# Patient Record
Sex: FEMALE | Race: WHITE | NOT HISPANIC OR LATINO | Employment: OTHER | URBAN - METROPOLITAN AREA
[De-identification: names, ages, dates, MRNs, and addresses within clinical notes are randomized per-mention and may not be internally consistent; named-entity substitution may affect disease eponyms.]

---

## 2021-11-01 ENCOUNTER — OFFICE VISIT (OUTPATIENT)
Dept: FAMILY MEDICINE CLINIC | Facility: CLINIC | Age: 46
End: 2021-11-01

## 2021-11-01 VITALS
BODY MASS INDEX: 40.98 KG/M2 | HEIGHT: 66 IN | OXYGEN SATURATION: 98 % | HEART RATE: 83 BPM | DIASTOLIC BLOOD PRESSURE: 72 MMHG | SYSTOLIC BLOOD PRESSURE: 114 MMHG | TEMPERATURE: 98 F | WEIGHT: 255 LBS

## 2021-11-01 DIAGNOSIS — M25.562 CHRONIC PAIN OF BOTH KNEES: Primary | ICD-10-CM

## 2021-11-01 DIAGNOSIS — D50.9 IRON DEFICIENCY ANEMIA, UNSPECIFIED IRON DEFICIENCY ANEMIA TYPE: ICD-10-CM

## 2021-11-01 DIAGNOSIS — K52.9 CHRONIC DIARRHEA: ICD-10-CM

## 2021-11-01 DIAGNOSIS — J45.20 MILD INTERMITTENT ASTHMA WITHOUT COMPLICATION: ICD-10-CM

## 2021-11-01 DIAGNOSIS — Z23 NEED FOR VACCINATION: ICD-10-CM

## 2021-11-01 DIAGNOSIS — M25.561 CHRONIC PAIN OF BOTH KNEES: Primary | ICD-10-CM

## 2021-11-01 DIAGNOSIS — G89.29 CHRONIC PAIN OF BOTH KNEES: Primary | ICD-10-CM

## 2021-11-01 DIAGNOSIS — F25.9 SCHIZO AFFECTIVE SCHIZOPHRENIA (HCC): ICD-10-CM

## 2021-11-01 PROCEDURE — G0008 ADMIN INFLUENZA VIRUS VAC: HCPCS | Performed by: NURSE PRACTITIONER

## 2021-11-01 PROCEDURE — 90686 IIV4 VACC NO PRSV 0.5 ML IM: CPT | Performed by: NURSE PRACTITIONER

## 2021-11-01 PROCEDURE — 99214 OFFICE O/P EST MOD 30 MIN: CPT | Performed by: NURSE PRACTITIONER

## 2021-11-01 RX ORDER — FLUVOXAMINE MALEATE 100 MG
100 TABLET ORAL 2 TIMES DAILY
COMMUNITY
Start: 2021-10-29 | End: 2021-12-06 | Stop reason: SDUPTHER

## 2021-11-01 RX ORDER — CYCLOBENZAPRINE HCL 10 MG
10 TABLET ORAL 3 TIMES DAILY PRN
COMMUNITY
End: 2021-12-06 | Stop reason: SDUPTHER

## 2021-11-01 RX ORDER — LAMOTRIGINE 150 MG/1
150 TABLET ORAL DAILY
COMMUNITY
Start: 2021-10-29 | End: 2021-12-06 | Stop reason: SDUPTHER

## 2021-11-01 RX ORDER — ZIPRASIDONE HYDROCHLORIDE 80 MG/1
80 CAPSULE ORAL 2 TIMES DAILY
COMMUNITY
Start: 2021-10-29 | End: 2021-12-06 | Stop reason: SDUPTHER

## 2021-11-01 RX ORDER — PRAZOSIN HYDROCHLORIDE 2 MG/1
4 CAPSULE ORAL NIGHTLY
COMMUNITY
End: 2021-12-06 | Stop reason: SDUPTHER

## 2021-11-01 RX ORDER — CLONAZEPAM 1 MG/1
1 TABLET ORAL 2 TIMES DAILY
COMMUNITY
Start: 2021-10-04 | End: 2021-12-06 | Stop reason: SDUPTHER

## 2021-11-01 RX ORDER — OLANZAPINE 5 MG/1
5 TABLET, ORALLY DISINTEGRATING ORAL
COMMUNITY
End: 2021-12-06 | Stop reason: SDUPTHER

## 2021-11-01 NOTE — PROGRESS NOTES
Chief Complaint  Establish Care (new pt - nonfasting. [place]oved from [place] in June. ) and Knee Pain (worsening chronic bilateral knee pain - was supposed to see ortho surgen before move. )  Masks/face shield/appropriate PPE were worn for the entirety of the visit by the patient, MA, and provider.     Subjective          Aurora Bull presents to [facility]  History of Present Illness  Aurora Bull is a 46 y.o. female who presents to the clinic today to establish care. She recently moved from [place]. She has a medical history of schizoaffective disorder, anxiety, depression, and asthma. She underwent a hysterectomy in 2019 due to menorrhagia causing iron deficiency anemia.     · Schizoaffective disorder/Anxiety/depression: She was hospitalized in 2012 for 15 days. She does not do well talking on the phone or through video. Patient was following with a psychiatrist in [place]. She has not yet established with a psychiatrist here in [place]. She is taking ziprasidone and lamotrigine daily and well as fluvoxamine. She is taking olanzapine and clonazepam as needed for anxiety.    · Asthma: Chronic. Patient is unsure if she has had PFT's done in the past. She was supposed to be on Advair daily, but it was too expensive. She takes albuterol as needed. Her asthma is triggered by exercise, dust, weather change,and ciagarette smoke. She has only had to use her albuterol inhaler recently with exercise. She likes to ride the stationary bike.   · Chronic knee pain: Patient states she fell off a horse when she was 20. She had one knee surgery in 1995 and the second in 2014. In [place], patient was referred to an orthopedic doctor, but she was not able to have a visit with this provider prior to moving to [facility]. She has tried CBD in the past for her pain.     · Chornic diarrhea: This has been onging for a year. She had a colonoscopy in [place] to determine what was going on and every thing was reportedly  "fine. She had a visit with a gastroenterologist in [place] and no cause of her chornic diarrhea was determined. She did not make changes to her diet or medications at this time. She denies family history of celiac disease and she denies unexpected weight loss. She states this has caused chornic low potassium levels.  She had weight loss surgery in 2014. She was taking Imodium about 3 times a day.   · History of iron defciency: Patient was having iron deficiecny anemia consistently due to menorrhagia. She was receiving IV iron routinely. Her iron levels have not been checked since her hysterectomy.     Review of Systems   Constitutional: Negative.    HENT: Negative.    Eyes: Negative.    Respiratory: Negative.    Cardiovascular: Negative.    Gastrointestinal: Negative.    Endocrine: Negative.    Genitourinary: Negative.    Musculoskeletal: Positive for arthralgias.   Skin: Negative.    Allergic/Immunologic: Negative.    Neurological: Negative.    Hematological: Negative.    Psychiatric/Behavioral: Negative.        Objective   Vital Signs:   /72 (BP Location: Right arm, Patient Position: Sitting)   Pulse 83   Temp 98 °F (36.7 °C)   Ht 166.4 cm (65.5\")   Wt 116 kg (255 lb)   SpO2 98%   BMI 41.79 kg/m²     Physical Exam  Vitals reviewed. Exam conducted with a chaperone present.   Constitutional:       General: She is not in acute distress.     Appearance: Normal appearance. She is not ill-appearing, toxic-appearing or diaphoretic.   HENT:      Head: Normocephalic and atraumatic.   Eyes:      General: No scleral icterus.        Right eye: No discharge.         Left eye: No discharge.      Extraocular Movements: Extraocular movements intact.      Comments: Glasses   Cardiovascular:      Rate and Rhythm: Normal rate and regular rhythm.      Heart sounds: Normal heart sounds.   Pulmonary:      Effort: Pulmonary effort is normal. No respiratory distress.      Breath sounds: Normal breath sounds. No wheezing. "   Abdominal:      General: Bowel sounds are increased. There is no distension.      Palpations: Abdomen is soft.      Tenderness: There is no abdominal tenderness.   Musculoskeletal:         General: Normal range of motion.      Right knee: No swelling. Tenderness (Generalized) present. No ACL laxity or PCL laxity. Normal meniscus. Normal pulse.      Left knee: No swelling. Tenderness (Generalized) present. No ACL laxity or PCL laxity.Normal meniscus. Normal pulse.      Right lower leg: No edema.      Left lower leg: No edema.   Skin:     General: Skin is warm.   Neurological:      General: No focal deficit present.      Mental Status: She is alert and oriented to person, place, and time.      Motor: No weakness.      Gait: Gait normal.   Psychiatric:         Attention and Perception: Attention normal.         Mood and Affect: Mood and affect normal.         Speech: Speech normal.         Behavior: Behavior normal.        Result Review :                 Assessment and Plan {CC Problem List  Visit Diagnosis   ROS  Review (Popup)  Health Maintenance  Quality  BestPractice  Medications  SmartSets  SnapShot Encounters  Media :23}   Diagnoses and all orders for this visit:    1. Chronic pain of both knees (Primary)  -     Ambulatory Referral to Orthopedic Surgery    2. Need for vaccination  -     FluLaval/Fluarix/Fluzone >6 Months ([id number])    3. Chronic diarrhea  -     Comprehensive metabolic panel  -     Magnesium  -     Phosphorus  -     Ambulatory Referral to Gastroenterology    4. Mild intermittent asthma without complication    5. Schizo affective schizophrenia (HCC)    6. Iron deficiency anemia, unspecified iron deficiency anemia type  -     CBC w AUTO Differential  -     Ferritin  -     Iron Profile      1. Chronic knee pain: Due to the chronicity of patient's knee pain and her being established with an orthopedist in [place], patient has been referred to orthopedic surgery today.   2. Chronic  diarrhea: Patient was following with a gastroenterologist in [place]. For this reason, referral has been placed today. Will also check electrolytes like potassium, magnesium, and phosphorus due to diarrhea.   3. Anemia: Will evaluate CBC and iron studies today.   4. Pschizoaffective disorder: Patient was provided with a list of psychiatrist int he area. She was also advised to call her insurance compnay to find a mental health care provider covered by her insurance company.    5. Asthma: Patient's asthma appears to be mild at the moment. Will need to consider PFT testing in the future.     Will try to obtain records from patient's PCP and specialist in [place].   I spent 30 minutes caring for Aurora on this date of service. This time includes time spent by me in the following activities:obtaining and/or reviewing a separately obtained history, performing a medically appropriate examination and/or evaluation , ordering medications, tests, or procedures and documenting information in the medical record  Follow Up   Return in about 4 weeks (around 11/29/2021) for Recheck, lab follow-up.  Patient was given instructions and counseling regarding her condition or for health maintenance advice. Please see specific information pulled into the AVS if appropriate.     Electronically signed by MJ Valadez, 11/03/21, 7:44 AM EDT.

## 2021-11-02 LAB
ALBUMIN SERPL-MCNC: 4.6 G/DL (ref 3.8–4.8)
ALBUMIN/GLOB SERPL: 1.8 {RATIO} (ref 1.2–2.2)
ALP SERPL-CCNC: 75 IU/L (ref 44–121)
ALT SERPL-CCNC: 12 IU/L (ref 0–32)
AST SERPL-CCNC: 14 IU/L (ref 0–40)
BASOPHILS # BLD AUTO: 0 X10E3/UL (ref 0–0.2)
BASOPHILS NFR BLD AUTO: 1 %
BILIRUB SERPL-MCNC: 0.5 MG/DL (ref 0–1.2)
BUN SERPL-MCNC: 12 MG/DL (ref 6–24)
BUN/CREAT SERPL: 11 (ref 9–23)
CALCIUM SERPL-MCNC: 9.6 MG/DL (ref 8.7–10.2)
CHLORIDE SERPL-SCNC: 104 MMOL/L (ref 96–106)
CO2 SERPL-SCNC: 25 MMOL/L (ref 20–29)
CREAT SERPL-MCNC: 1.12 MG/DL (ref 0.57–1)
EOSINOPHIL # BLD AUTO: 0.1 X10E3/UL (ref 0–0.4)
EOSINOPHIL NFR BLD AUTO: 1 %
ERYTHROCYTE [DISTWIDTH] IN BLOOD BY AUTOMATED COUNT: 12.9 % (ref 11.7–15.4)
FERRITIN SERPL-MCNC: 146 NG/ML (ref 15–150)
GLOBULIN SER CALC-MCNC: 2.6 G/DL (ref 1.5–4.5)
GLUCOSE SERPL-MCNC: 88 MG/DL (ref 65–99)
HCT VFR BLD AUTO: 43.9 % (ref 34–46.6)
HGB BLD-MCNC: 14.5 G/DL (ref 11.1–15.9)
IMM GRANULOCYTES # BLD AUTO: 0 X10E3/UL (ref 0–0.1)
IMM GRANULOCYTES NFR BLD AUTO: 0 %
IRON SATN MFR SERPL: 22 % (ref 15–55)
IRON SERPL-MCNC: 68 UG/DL (ref 27–159)
LYMPHOCYTES # BLD AUTO: 1.3 X10E3/UL (ref 0.7–3.1)
LYMPHOCYTES NFR BLD AUTO: 21 %
MAGNESIUM SERPL-MCNC: 2 MG/DL (ref 1.6–2.3)
MCH RBC QN AUTO: 29.4 PG (ref 26.6–33)
MCHC RBC AUTO-ENTMCNC: 33 G/DL (ref 31.5–35.7)
MCV RBC AUTO: 89 FL (ref 79–97)
MONOCYTES # BLD AUTO: 0.5 X10E3/UL (ref 0.1–0.9)
MONOCYTES NFR BLD AUTO: 8 %
NEUTROPHILS # BLD AUTO: 4.2 X10E3/UL (ref 1.4–7)
NEUTROPHILS NFR BLD AUTO: 69 %
PHOSPHATE SERPL-MCNC: 3.2 MG/DL (ref 3–4.3)
PLATELET # BLD AUTO: 243 X10E3/UL (ref 150–450)
POTASSIUM SERPL-SCNC: 4.9 MMOL/L (ref 3.5–5.2)
PROT SERPL-MCNC: 7.2 G/DL (ref 6–8.5)
RBC # BLD AUTO: 4.93 X10E6/UL (ref 3.77–5.28)
SODIUM SERPL-SCNC: 143 MMOL/L (ref 134–144)
TIBC SERPL-MCNC: 309 UG/DL (ref 250–450)
UIBC SERPL-MCNC: 241 UG/DL (ref 131–425)
WBC # BLD AUTO: 6 X10E3/UL (ref 3.4–10.8)

## 2021-11-03 PROBLEM — F25.9 SCHIZO AFFECTIVE SCHIZOPHRENIA: Status: ACTIVE | Noted: 2021-11-03

## 2021-11-03 PROBLEM — J45.20 MILD INTERMITTENT ASTHMA WITHOUT COMPLICATION: Status: ACTIVE | Noted: 2021-11-03

## 2021-11-09 ENCOUNTER — PATIENT ROUNDING (BHMG ONLY) (OUTPATIENT)
Dept: FAMILY MEDICINE CLINIC | Facility: CLINIC | Age: 46
End: 2021-11-09

## 2021-11-09 NOTE — PROGRESS NOTES
November 9, 2021    Hello, may I speak with Aurora Bull?    My name is Betsy Butler.      I am  with [facility]  [address] [place] [id number] [phone].    Before we get started may I verify your date of birth? 1975    I am calling to officially welcome you to our practice and ask about your recent visit. Is this a good time to talk? No, My Chart message sent.

## 2021-11-10 ENCOUNTER — OFFICE VISIT (OUTPATIENT)
Dept: ORTHOPEDIC SURGERY | Facility: CLINIC | Age: 46
End: 2021-11-10

## 2021-11-10 VITALS — BODY MASS INDEX: 40.18 KG/M2 | WEIGHT: 250 LBS | HEIGHT: 66 IN | TEMPERATURE: 96.9 F

## 2021-11-10 DIAGNOSIS — M25.561 PAIN IN BOTH KNEES, UNSPECIFIED CHRONICITY: Primary | ICD-10-CM

## 2021-11-10 DIAGNOSIS — M17.0 ARTHRITIS OF BOTH KNEES: ICD-10-CM

## 2021-11-10 DIAGNOSIS — M25.562 PAIN IN BOTH KNEES, UNSPECIFIED CHRONICITY: Primary | ICD-10-CM

## 2021-11-10 PROCEDURE — 73564 X-RAY EXAM KNEE 4 OR MORE: CPT | Performed by: ORTHOPAEDIC SURGERY

## 2021-11-10 PROCEDURE — 99204 OFFICE O/P NEW MOD 45 MIN: CPT | Performed by: ORTHOPAEDIC SURGERY

## 2021-11-10 NOTE — PROGRESS NOTES
"Patient Name: Aurora Bull   YOB: 1975  Referring Primary Care Physician: Sonia Christiansen, MJ  BMI: Body mass index is 40.35 kg/m².    Chief Complaint:    Chief Complaint   Patient presents with   • Right Knee - Initial Evaluation   • Left Knee - Initial Evaluation        HPI:     Aurora Bull is a 46 y.o. female who presents today for evaluation of   Chief Complaint   Patient presents with   • Right Knee - Initial Evaluation   • Left Knee - Initial Evaluation   .  Patient is 46 years old and has long-term history of bilateral knee pain and problems.  When she was 19 she was riding horses a lot she had some knee injuries Dr. Dillon operated on her and did a procedure in her right knee that appears to be arthroscopic.  She later moved to [place] and she had a procedure to \"rebuild the cartilage\" however it did not work she also gets some symptoms on the left but it is been bothering the right a lot she has been to physical therapy and says she does do home program of therapy but not sure it helps so she cannot take anti-inflammatories secondary to kidney disease upon researching her chart.  She moved back to [facility] to be near her parents.  She is on disability      Subjective   Medications:   Home Medications:  Current Outpatient Medications on File Prior to Visit   Medication Sig   • clonazePAM (KlonoPIN) 1 MG tablet Take 60 mg by mouth 2 (Two) Times a Day.   • cyclobenzaprine (FLEXERIL) 10 MG tablet Take 10 mg by mouth 3 (Three) Times a Day As Needed for Muscle Spasms.   • fluvoxaMINE (LUVOX) 100 MG tablet Take 100 mg by mouth 2 (Two) Times a Day.   • lamoTRIgine (LaMICtal) 150 MG tablet Take 150 mg by mouth Daily.   • OLANZapine zydis (zyPREXA) 5 MG disintegrating tablet Place 5 mg on the tongue. dissolve one tablet by mouth for increase psychosis may repeat in 30 minutes if needed. Limit 2 tablets a day   • prazosin (MINIPRESS) 2 MG capsule Take 4 mg by mouth Every Night. 1-4 tablets nightly   • " ziprasidone (GEODON) 80 MG capsule Take 80 mg by mouth 2 (Two) Times a Day.     No current facility-administered medications on file prior to visit.     Current Medications:  Scheduled Meds:  Continuous Infusions:No current facility-administered medications for this visit.    PRN Meds:.    I have reviewed the patient's medical history in detail and updated the computerized patient record.  Review and summarization of old records includes:    Past Medical History:   Diagnosis Date   • Asthma    • Chronic diarrhea    • Chronic knee pain     bilateral   • Chronic lower back pain    • Depression    • Kidney stones    • PTSD (post-traumatic stress disorder)    • Schizoaffective disorder (HCC)         Past Surgical History:   Procedure Laterality Date   • ADENOIDECTOMY     •  SECTION     • CHOLECYSTECTOMY     • COLONOSCOPY     • ENDOSCOPY     • GASTRIC SLEEVE LAPAROSCOPIC     • HYSTERECTOMY  2019   • KNEE CARTILAGE SURGERY Right 2015   • KNEE SURGERY Right 2016    scope   • TONSILLECTOMY          Social History     Occupational History   • Not on file   Tobacco Use   • Smoking status: Former Smoker     Packs/day: 1.50     Years: 24.00     Pack years: 36.00     Types: Cigarettes   • Smokeless tobacco: Never Used   • Tobacco comment: Quit in    Substance and Sexual Activity   • Alcohol use: Yes     Alcohol/week: 2.0 standard drinks     Types: 2 Shots of liquor per week     Comment: 2 shots nights   • Drug use: Not Currently   • Sexual activity: Not on file      Social History     Social History Narrative   • Not on file        Family History   Problem Relation Age of Onset   • Diabetes Mother    • Diabetes Father    • Hyperlipidemia Father    • Heart disease Father    • Hypertension Father    • Hyperlipidemia Brother    • Diabetes Brother    • Heart block Brother    • Scoliosis Son    • Diabetes Maternal Aunt    • Dwarfism Maternal Uncle    • Hypertension Paternal Uncle    •  "Mental illness Maternal Grandmother    • Diabetes Maternal Grandmother    • Memory loss Maternal Grandmother    • Esophageal cancer Maternal Grandfather    • Alzheimer's disease Paternal Grandmother    • Hypertension Paternal Grandmother    • Hypertension Paternal Grandfather    • Heart murmur Paternal Grandfather    • Seasonal affective disorder Brother    • Drug abuse Brother        ROS: 14 point review of systems was performed and all other systems were reviewed and are negative except for documented findings in HPI and today's encounter.     Allergies: No Known Allergies  Constitutional:  Denies fever, shaking or chills   Eyes:  Denies change in visual acuity   HENT:  Denies nasal congestion or sore throat   Respiratory:  Denies cough or shortness of breath   Cardiovascular:  Denies chest pain or severe LE edema   GI:  Denies abdominal pain, nausea, vomiting, bloody stools or diarrhea   Musculoskeletal:  Numbness, tingling, pain, or loss of motor function only as noted above in history of present illness.  : Denies painful urination or hematuria  Integument:  Denies rash, lesion or ulceration   Neurologic:  Denies headache or focal weakness  Endocrine:  Denies lymphadenopathy  Psych:  Denies confusion or change in mental status   Hem:  Denies active bleeding    OBJECTIVE:  Physical Exam: 46 y.o. female  Wt Readings from Last 3 Encounters:   11/10/21 113 kg (250 lb)   11/01/21 116 kg (255 lb)     Ht Readings from Last 1 Encounters:   11/10/21 167.6 cm (66\")     Body mass index is 40.35 kg/m².  Vitals:    11/10/21 1445   Temp: 96.9 °F (36.1 °C)     Vital signs reviewed.     General Appearance:    Alert, cooperative, in no acute distress                  Eyes: conjunctiva clear  ENT: external ears and nose atraumatic  CV: no peripheral edema  Resp: normal respiratory effort  Skin: no rashes or wounds; normal turgor  Psych: mood and affect appropriate  Lymph: no nodes appreciated  Neuro: gross sensation " intact  Vascular:  Palpable peripheral pulse in noted extremity  Musculoskeletal Extremities: Exam today shows bilateral knees to be stiff with crepitation synovitis and swelling she lacks about 7 degrees of extension on the left about 5 degrees on the right she flexes back to about 100 where she has some tightness Ezra's is negative she has diffuse joint line tenderness and synovitis    Radiology:   AP lateral 40 degree PA x-ray bilateral knees taken the office today for complaints of pain without comparison views show advanced arthritic change with large osteophytes in both knees        Assessment:     ICD-10-CM ICD-9-CM   1. Pain in both knees, unspecified chronicity  M25.561 719.46    M25.562    2. Arthritis of both knees  M17.0 716.96        MDM/Plan:   The diagnosis(es), natural history, pathophysiology and treatment for diagnosis(es) were discussed. Opportunity given and questions answered.  Biomechanics of pertinent body areas discussed.  When appropriate, the use of ambulatory aids discussed.    BMI:  The concept of BMI body mass index and its importance and implications discussed.    EXERCISES:  Advice on benefits of, and types of regular/moderate exercise pertaining to orthopedic diagnosis(es).  MEDICATIONS:  The risks, benefits, warnings,side effects and alternatives of medications discussed.  Inflammation/pain control; with cold, heat, elevation and/or liniments discussed as appropriate  Cortisone Injection. See procedure note.  HOME EXERCISE/PT program encouraged  Her knees have severe changes and she is seen multiple orthopedic surgeons in [place].  I told her ideally she can manage well with nonoperative therapy would be advisable because she is only 46.  I went over other treatments and answer questions    11/10/2021    Dictated utilizing Dragon dictation

## 2021-11-18 NOTE — PROGRESS NOTES
"Chief Complaint   Patient presents with   • Diarrhea         History of Present Illness  Patient is a 26-year-old female who presents today for evaluation. She was referred for chronic diarrhea.    Patient presents today with concerns about diarrhea.  This has been present for greater than 1 year.  But she reports she never has a solid bowel movement.  She generally has at least 2-3 stools per day but can have multiple stools per day.  Reports fecal urgency and at times has had fecal incontinence due to the diarrhea.  She denies any associated abdominal pain and denies any blood in the stool.  Denies any upper GI symptoms.    She has tried Imodium with no improvement in symptoms.    She reports she recently moved here from [place].  While in [place], she had a colonoscopy done around a year ago that she reports showed no evidence of colitis.  She reports previous fecal calprotectin testing was elevated with a result around 200.    Denies any pertinent family  history of GI issues.    Review of Systems   Constitutional: Negative for unexpected weight change.        Result Review :       Ferritin (11/01/2021 16:00)   Iron Profile (11/01/2021 16:00)   Comprehensive metabolic panel (11/01/2021 16:00)   CBC w AUTO Differential (11/01/2021 16:00)   Referral to Gastroenterology for Chronic diarrhea (11/01/2021)       Vital Signs:   /70   Pulse 72   Temp 96.8 °F (36 °C)   Ht 167.6 cm (66\")   Wt 115 kg (252 lb 11.2 oz)   SpO2 96%   BMI 40.79 kg/m²     Body mass index is 40.79 kg/m².     Physical Exam  Vitals reviewed.   Constitutional:       General: She is not in acute distress.     Appearance: She is well-developed.   HENT:      Head: Normocephalic and atraumatic.   Pulmonary:      Effort: Pulmonary effort is normal. No respiratory distress.   Abdominal:      General: Abdomen is flat. Bowel sounds are normal. There is no distension.      Palpations: Abdomen is soft.      Tenderness: There is no abdominal " tenderness.   Skin:     General: Skin is dry.      Coloration: Skin is not pale.   Neurological:      Mental Status: She is alert and oriented to person, place, and time.   Psychiatric:         Thought Content: Thought content normal.           Assessment and Plan    Diagnoses and all orders for this visit:    1. Diarrhea, unspecified type (Primary)  -     C-reactive Protein  -     Celiac Disease Panel    2. Irritable bowel syndrome with diarrhea    Other orders  -     riFAXIMin (Xifaxan) 550 MG tablet; Take 1 tablet by mouth 3 (Three) Times a Day for 14 days.  Dispense: 42 tablet; Refill: 2  -     dicyclomine (BENTYL) 10 MG capsule; Take 1 capsule by mouth 3 (Three) Times a Day As Needed (abdominal pain/diarrhea).  Dispense: 90 capsule; Refill: 5         Discussion  Patient presents today for evaluation of persistent diarrhea.  She reports colonoscopy done 1 year ago was normal with no evidence of colitis however she then had a elevated fecal calprotectin.  As colonoscopy was negative, discussed symptoms likely secondary to IBS-D and will provide treatment with dicyclomine and Xifaxan.  We will check celiac panel today and check CRP.  If symptoms do not improve, we may need to consider updated endoscopic evaluation or may consider imaging.  Discussed concern for possible bowel inflammation secondary to her reported elevated fecal calprotectin however since she reports prior colonoscopy was negative, we will initiate work-up and treatment as above.          Follow Up   Return in about 4 weeks (around 12/17/2021).    Patient Instructions   Check lab work today.    For diarrhea, begin trial of dicyclomine as prescribed. Prescription sent to pharmacy.    For IBS-D, complete course of Xifaxan as prescribed.

## 2021-11-19 ENCOUNTER — OFFICE VISIT (OUTPATIENT)
Dept: GASTROENTEROLOGY | Facility: CLINIC | Age: 46
End: 2021-11-19

## 2021-11-19 VITALS
BODY MASS INDEX: 40.61 KG/M2 | WEIGHT: 252.7 LBS | SYSTOLIC BLOOD PRESSURE: 100 MMHG | TEMPERATURE: 96.8 F | HEART RATE: 72 BPM | HEIGHT: 66 IN | OXYGEN SATURATION: 96 % | DIASTOLIC BLOOD PRESSURE: 70 MMHG

## 2021-11-19 DIAGNOSIS — K58.0 IRRITABLE BOWEL SYNDROME WITH DIARRHEA: ICD-10-CM

## 2021-11-19 DIAGNOSIS — R19.7 DIARRHEA, UNSPECIFIED TYPE: Primary | ICD-10-CM

## 2021-11-19 PROCEDURE — 99213 OFFICE O/P EST LOW 20 MIN: CPT | Performed by: NURSE PRACTITIONER

## 2021-11-19 RX ORDER — DICYCLOMINE HYDROCHLORIDE 10 MG/1
10 CAPSULE ORAL 3 TIMES DAILY PRN
Qty: 90 CAPSULE | Refills: 5 | Status: SHIPPED | OUTPATIENT
Start: 2021-11-19 | End: 2022-08-09

## 2021-11-19 NOTE — PATIENT INSTRUCTIONS
Check lab work today.    For diarrhea, begin trial of dicyclomine as prescribed. Prescription sent to pharmacy.    For IBS-D, complete course of Xifaxan as prescribed.

## 2021-11-22 ENCOUNTER — TELEPHONE (OUTPATIENT)
Dept: FAMILY MEDICINE CLINIC | Facility: CLINIC | Age: 46
End: 2021-11-22

## 2021-11-22 ENCOUNTER — TELEPHONE (OUTPATIENT)
Dept: GASTROENTEROLOGY | Facility: CLINIC | Age: 46
End: 2021-11-22

## 2021-11-22 LAB
CRP SERPL-MCNC: 2 MG/L (ref 0–10)
ENDOMYSIUM IGA SER QL: NEGATIVE
IGA SERPL-MCNC: 115 MG/DL (ref 87–352)
TTG IGA SER-ACNC: <2 U/ML (ref 0–3)

## 2021-11-22 NOTE — TELEPHONE ENCOUNTER
Patients father Mumtaz hernandez. Xifaxan is over $600.oo copay.   They would like alternative.   They are aware that you are out of the office until Wednesday.   Desiree

## 2021-11-22 NOTE — TELEPHONE ENCOUNTER
PATIENT'S FATHER CALLED STATED THAT THE ORTHOPEDIC SURGEON DOES NOT PRESCRIBE PAIN MEDS AND ADVISED TO SPEAK WITH YOU.     SHE HAS APPT FOR Monday AND ON WAIT-LIST       PATIENT IS IN A LOT OF PAIN AND WANTED TO KNOW IF YOU COULD PRESCRIBE SOMETHING TO DO HER UNTIL Monday'S APPT         PLEASE CALL AND ADVISE   JODY SUMNERNAREN (Father) [phone] (H)     PHARMACY:   [address] - [phone]  - [phone]   [phone]

## 2021-11-23 ENCOUNTER — OFFICE VISIT (OUTPATIENT)
Dept: FAMILY MEDICINE CLINIC | Facility: CLINIC | Age: 46
End: 2021-11-23

## 2021-11-23 VITALS
HEIGHT: 66 IN | SYSTOLIC BLOOD PRESSURE: 112 MMHG | TEMPERATURE: 98 F | OXYGEN SATURATION: 98 % | DIASTOLIC BLOOD PRESSURE: 72 MMHG | BODY MASS INDEX: 41.3 KG/M2 | WEIGHT: 257 LBS | HEART RATE: 101 BPM

## 2021-11-23 DIAGNOSIS — M25.561 CHRONIC PAIN OF BOTH KNEES: Primary | ICD-10-CM

## 2021-11-23 DIAGNOSIS — M25.562 CHRONIC PAIN OF BOTH KNEES: Primary | ICD-10-CM

## 2021-11-23 DIAGNOSIS — G89.29 CHRONIC PAIN OF BOTH KNEES: Primary | ICD-10-CM

## 2021-11-23 PROBLEM — D50.9 IRON DEFICIENCY ANEMIA: Status: ACTIVE | Noted: 2017-04-03

## 2021-11-23 PROBLEM — I20.0 PREINFARCTION SYNDROME: Status: ACTIVE | Noted: 2017-03-02

## 2021-11-23 PROBLEM — K21.9 GASTROESOPHAGEAL REFLUX DISEASE: Status: ACTIVE | Noted: 2017-04-03

## 2021-11-23 PROBLEM — J45.40 MODERATE PERSISTENT ASTHMA: Status: ACTIVE | Noted: 2017-04-03

## 2021-11-23 PROBLEM — Z82.49 FAMILY HISTORY OF CORONARY ARTERIOSCLEROSIS: Status: ACTIVE | Noted: 2017-03-31

## 2021-11-23 PROBLEM — Z87.891 EX-SMOKER: Status: ACTIVE | Noted: 2017-03-31

## 2021-11-23 PROBLEM — R31.9 BLOOD IN URINE: Status: ACTIVE | Noted: 2017-04-04

## 2021-11-23 PROCEDURE — 99213 OFFICE O/P EST LOW 20 MIN: CPT | Performed by: NURSE PRACTITIONER

## 2021-11-23 NOTE — PROGRESS NOTES
"Chief Complaint  Pain (knee pain medication follow up ,would like to talk referral for pain manaGMENT )    Subjective          Aurora Bull presents to [facility]  History of Present Illness  Patient of Marilyn seeing me today.  Patient first saw Desiree on 11/1/2021 and one of her issues that she spoke with at that time was chronic knee pain.  Desiree referred her to an orthopedic who she saw on 11/10/2021.  X-rays at that office visit did show advanced arthritic changes with large osteophytes in both knees.  Peers it says she had a cortisone injection at that office.  She was advised that she can manage well with nonoperative therapy she suggested due to her young age.  She is presenting back to the office today requesting pain medication or pain management for bilateral chronic knee pain.    Objective   Vital Signs:   /72   Pulse 101   Temp 98 °F (36.7 °C)   Ht 167.6 cm (66\")   Wt 117 kg (257 lb)   SpO2 98%   BMI 41.48 kg/m²     Physical Exam  Vitals reviewed.   Constitutional:       General: She is not in acute distress.     Appearance: She is well-developed.   HENT:      Head: Normocephalic.   Cardiovascular:      Rate and Rhythm: Normal rate and regular rhythm.      Heart sounds: Normal heart sounds.   Pulmonary:      Effort: Pulmonary effort is normal.      Breath sounds: Normal breath sounds.   Neurological:      Mental Status: She is alert and oriented to person, place, and time.      Gait: Gait normal.   Psychiatric:         Behavior: Behavior normal.         Thought Content: Thought content normal.         Judgment: Judgment normal.        Result Review :   The following data was reviewed by: MJ Cadet on 11/23/2021:  CMP    CMP 11/1/21   Glucose 88   BUN 12   Creatinine 1.12 (A)   eGFR Non  Am 59 (A)   eGFR African Am 69   Sodium 143   Potassium 4.9   Chloride 104   Calcium 9.6   Total Protein 7.2   Albumin 4.6   Globulin 2.6   Total Bilirubin " 0.5   Alkaline Phosphatase 75   AST (SGOT) 14   ALT (SGPT) 12   (A) Abnormal value       Comments are available for some flowsheets but are not being displayed.           CBC w/diff    CBC w/Diff 11/1/21   WBC 6.0   RBC 4.93   Hemoglobin 14.5   Hematocrit 43.9   MCV 89   MCH 29.4   MCHC 33.0   RDW 12.9   Platelets 243   Neutrophil Rel % 69   Lymphocyte Rel % 21   Monocyte Rel % 8   Eosinophil Rel % 1   Basophil Rel % 1                                 Assessment and Plan    Diagnoses and all orders for this visit:    1. Chronic pain of both knees (Primary)  -     Ambulatory Referral to Pain Management        Follow Up   Return if symptoms worsen or fail to improve.  Patient was given instructions and counseling regarding her condition or for health maintenance advice. Please see specific information pulled into the AVS if appropriate.     Kidney function is slightly elevated so I do not want to give her an anti-inflammatory.  She is okay with this.  I am to refer her out to pain management.  Patient verbalized understanding and agreement with plan of care.  Follow-up with Desiree as needed    Mask and googles worn

## 2021-11-24 NOTE — TELEPHONE ENCOUNTER
Patient was prescribed dicyclomine at office visit.  Continue dicyclomine as needed for symptoms and keep follow-up as scheduled.  No additional prescription needed to replace the Xifaxan.

## 2021-11-28 NOTE — PROGRESS NOTES
Chief Complaint  Knee Pain (4w fu bilateral chronic knee pain - no changes )  Masks/face shield/appropriate PPE were worn for the entirety of the visit by the patient, MA, and provider.   Subjective          Aurora Bull presents to [facility]  History of Present Illness  Aurora Bull is a 46 y.o. female who presents to the clinic today with her father and service dog for follow-up.     Chronic knee pain: Patient was evaluated by the orthopedic surgeon. According to the patient, she was told the only way to fix her knees would be a knee replacement, but she is too young to have this done. She received steroid injections in her bilateral knees, that did not help. Patient was being seen by pain management in [place] for her bilateral knee pain. She was taking oxycodone as needed.     Chronic diarrhea: Patient was also evaluated by the gastroenterologist after our last visit. She was prescribed Bentyl as well as Xifaxan for likely IBS-D. However, the patient was unable to afford the Xifaxan and states she was not given an alternative. She has a follow-up with the gastroenterologist in mid December. She reports the Bentyl has not helped much.     Elevated serum creatinine: Patient's most recent lab work revealed elevated serum creatinine. Patient states she has had elevated creatinine levels and that is why she was told to stop taking NSAIDs, as it was suspected this class of medication could be the cause. However, patient has been off NSAIDs for a year. She does report a history of kidney stones. She denies difficulties with urination or flank pain today. She reports having ultrasounds and CT scans done in the past, but it has been more than a year.      Patient plans to establish with a psychiatrist this week.     Review of Systems   Constitutional: Negative.    HENT: Negative.    Eyes: Negative.    Respiratory: Negative.    Cardiovascular: Negative.    Gastrointestinal: Negative.   "  Endocrine: Negative.    Genitourinary: Negative.    Musculoskeletal: Positive for arthralgias.   Skin: Negative.    Allergic/Immunologic: Negative.    Neurological: Negative.    Hematological: Negative.    Psychiatric/Behavioral: Negative.       Objective   Vital Signs:   /76   Pulse 71   Temp 98 °F (36.7 °C)   Ht 167.6 cm (66\")   Wt 115 kg (254 lb)   SpO2 98%   BMI 41.00 kg/m²     Physical Exam  Vitals reviewed.   Constitutional:       General: She is not in acute distress.     Appearance: Normal appearance. She is obese. She is not ill-appearing, toxic-appearing or diaphoretic.   HENT:      Head: Normocephalic and atraumatic.   Cardiovascular:      Rate and Rhythm: Normal rate and regular rhythm.      Heart sounds: Normal heart sounds. No murmur heard.  No friction rub. No gallop.    Pulmonary:      Effort: Pulmonary effort is normal. No respiratory distress.      Breath sounds: Normal breath sounds. No wheezing.   Abdominal:      General: Bowel sounds are increased. There is no distension.      Palpations: Abdomen is soft. There is no mass.      Tenderness: There is no guarding.      Hernia: No hernia is present.   Musculoskeletal:      Right lower leg: No edema.      Left lower leg: No edema.   Skin:     General: Skin is warm.   Neurological:      General: No focal deficit present.      Mental Status: She is alert and oriented to person, place, and time.      Motor: No weakness.      Gait: Gait normal.   Psychiatric:         Mood and Affect: Mood normal.         Behavior: Behavior normal.        Result Review :     Common labs    Common Labsle 11/1/21 11/1/21    1600 1600   Glucose  88   BUN  12   Creatinine  1.12 (A)   eGFR Non  Am  59 (A)   eGFR African Am  69   Sodium  143   Potassium  4.9   Chloride  104   Calcium  9.6   Total Protein  7.2   Albumin  4.6   Total Bilirubin  0.5   Alkaline Phosphatase  75   AST (SGOT)  14   ALT (SGPT)  12   WBC 6.0    Hemoglobin 14.5    Hematocrit 43.9  "   Platelets 243    (A) Abnormal value       Comments are available for some flowsheets but are not being displayed.           CMP    CMP 11/1/21   Glucose 88   BUN 12   Creatinine 1.12 (A)   eGFR Non  Am 59 (A)   eGFR African Am 69   Sodium 143   Potassium 4.9   Chloride 104   Calcium 9.6   Total Protein 7.2   Albumin 4.6   Globulin 2.6   Total Bilirubin 0.5   Alkaline Phosphatase 75   AST (SGOT) 14   ALT (SGPT) 12   (A) Abnormal value       Comments are available for some flowsheets but are not being displayed.           CBC    CBC 11/1/21   WBC 6.0   RBC 4.93   Hemoglobin 14.5   Hematocrit 43.9   MCV 89   MCH 29.4   MCHC 33.0   RDW 12.9   Platelets 243           CBC w/diff    CBC w/Diff 11/1/21   WBC 6.0   RBC 4.93   Hemoglobin 14.5   Hematocrit 43.9   MCV 89   MCH 29.4   MCHC 33.0   RDW 12.9   Platelets 243   Neutrophil Rel % 69   Lymphocyte Rel % 21   Monocyte Rel % 8   Eosinophil Rel % 1   Basophil Rel % 1                     Assessment and Plan    Diagnoses and all orders for this visit:    1. Chronic pain of both knees (Primary)  -     traMADol (ULTRAM) 50 MG tablet; Take 1 tablet by mouth Every 8 (Eight) Hours As Needed for Moderate Pain .  Dispense: 21 tablet; Refill: 0    2. High risk medication use  -     Urine Drug Screen - Urine, Clean Catch    3. Elevated serum creatinine  -     Urinalysis With Microscopic - Urine, Clean Catch  -     US Renal Bilateral; Future    4. Chronic diarrhea    1. Chronic knee pain: Patient has been referred to pain management. This referral was placed last week. Due to patient's pain and elevated creatinine level. I have prescribed a week's worth of tramadol for patient to take as needed until she can be seen by pain management. Urine drug screen has been ordered. ADAM reviewed. Reviewed risk of scheduled pain medication with the patient. She verbalized understanding.   2. Chronic diarrhea: patient will continue to follow with gastroenterology as advised.    3. Elevated serum creatine: Based on review of past medical records uploaded to Epic, patient has had episodes of elevated serum creatinine up to 1.2. Most recent lab work from [place], however, showed normal creatinine. The elevated creatine on recent lab work obtained at this clinic could be incidental or related to decreased fluid intake. However, patient does report history of kidney stones. Urinalysis has been ordered today to evaluate for proteinuria. Will also obtain renal ultrasound.     Follow Up {Instructions Charge Capture  Follow-up Communications :23}  Return in about 6 weeks (around 1/10/2022) for Recheck. Recheck CMP prior to that appointment or at that appointment.   Patient was given instructions and counseling regarding her condition or for health maintenance advice. Please see specific information pulled into the AVS if appropriate.       Answers for HPI/ROS submitted by the patient on 11/23/2021  Have you had these symptoms before?: Yes  How long have you been having these symptoms?: Greater than 2 weeks  What is the primary reason for your visit?: Other    Conflicting answers have been found for some questions. Please document the patient's answers manually.     Electronically signed by MJ Valadez, 11/30/21, 5:40 PM EST.

## 2021-11-29 ENCOUNTER — OFFICE VISIT (OUTPATIENT)
Dept: FAMILY MEDICINE CLINIC | Facility: CLINIC | Age: 46
End: 2021-11-29

## 2021-11-29 VITALS
DIASTOLIC BLOOD PRESSURE: 76 MMHG | HEART RATE: 71 BPM | TEMPERATURE: 98 F | BODY MASS INDEX: 40.82 KG/M2 | OXYGEN SATURATION: 98 % | WEIGHT: 254 LBS | HEIGHT: 66 IN | SYSTOLIC BLOOD PRESSURE: 118 MMHG

## 2021-11-29 DIAGNOSIS — K52.9 CHRONIC DIARRHEA: ICD-10-CM

## 2021-11-29 DIAGNOSIS — R79.89 ELEVATED SERUM CREATININE: ICD-10-CM

## 2021-11-29 DIAGNOSIS — G89.29 CHRONIC PAIN OF BOTH KNEES: Primary | ICD-10-CM

## 2021-11-29 DIAGNOSIS — M25.561 CHRONIC PAIN OF BOTH KNEES: Primary | ICD-10-CM

## 2021-11-29 DIAGNOSIS — Z79.899 HIGH RISK MEDICATION USE: ICD-10-CM

## 2021-11-29 DIAGNOSIS — M25.562 CHRONIC PAIN OF BOTH KNEES: Primary | ICD-10-CM

## 2021-11-29 PROCEDURE — 99214 OFFICE O/P EST MOD 30 MIN: CPT | Performed by: NURSE PRACTITIONER

## 2021-11-29 RX ORDER — TRAMADOL HYDROCHLORIDE 50 MG/1
50 TABLET ORAL EVERY 8 HOURS PRN
Qty: 21 TABLET | Refills: 0 | Status: SHIPPED | OUTPATIENT
Start: 2021-11-29 | End: 2021-12-06 | Stop reason: SDUPTHER

## 2021-11-30 PROBLEM — K52.9 CHRONIC DIARRHEA: Status: ACTIVE | Noted: 2021-11-30

## 2021-12-02 LAB
AMPHETAMINES UR QL SCN: NEGATIVE NG/ML
APPEARANCE UR: ABNORMAL
BACTERIA #/AREA URNS HPF: ABNORMAL /[HPF]
BARBITURATES UR QL SCN: NEGATIVE NG/ML
BENZODIAZ UR QL SCN: NEGATIVE NG/ML
BILIRUB UR QL STRIP: NEGATIVE
BZE UR QL SCN: NEGATIVE NG/ML
CANNABINOIDS UR QL SCN: NEGATIVE NG/ML
CASTS URNS QL MICRO: ABNORMAL /LPF
COLOR UR: YELLOW
CREAT UR-MCNC: 337.4 MG/DL (ref 20–300)
CRYSTALS URNS MICRO: ABNORMAL
EPI CELLS #/AREA URNS HPF: >10 /HPF (ref 0–10)
GLUCOSE UR QL: NEGATIVE
HGB UR QL STRIP: NEGATIVE
KETONES UR QL STRIP: ABNORMAL
LABORATORY COMMENT REPORT: ABNORMAL
LEUKOCYTE ESTERASE UR QL STRIP: NEGATIVE
METHADONE UR QL SCN: NEGATIVE NG/ML
MICRO URNS: ABNORMAL
MICRO URNS: ABNORMAL
NITRITE UR QL STRIP: NEGATIVE
OPIATES UR QL SCN: NEGATIVE NG/ML
OXYCODONE+OXYMORPHONE UR QL SCN: NEGATIVE NG/ML
PCP UR QL: NEGATIVE NG/ML
PH UR STRIP: 5.5 [PH] (ref 5–7.5)
PH UR: 5.7 [PH] (ref 4.5–8.9)
PROPOXYPH UR QL SCN: NEGATIVE NG/ML
PROT UR QL STRIP: ABNORMAL
RBC #/AREA URNS HPF: ABNORMAL /HPF (ref 0–2)
SP GR UR: 1.02 (ref 1–1.03)
UNIDENT CRYS URNS QL MICRO: PRESENT
UROBILINOGEN UR STRIP-MCNC: 1 MG/DL (ref 0.2–1)
WBC #/AREA URNS HPF: ABNORMAL /HPF (ref 0–5)

## 2021-12-06 DIAGNOSIS — M25.561 CHRONIC PAIN OF BOTH KNEES: ICD-10-CM

## 2021-12-06 DIAGNOSIS — M25.562 CHRONIC PAIN OF BOTH KNEES: ICD-10-CM

## 2021-12-06 DIAGNOSIS — G89.29 CHRONIC PAIN OF BOTH KNEES: ICD-10-CM

## 2021-12-06 DIAGNOSIS — F25.9 SCHIZO AFFECTIVE SCHIZOPHRENIA (HCC): Primary | ICD-10-CM

## 2021-12-06 RX ORDER — CYCLOBENZAPRINE HCL 10 MG
10 TABLET ORAL 3 TIMES DAILY PRN
Qty: 90 TABLET | Refills: 0 | Status: SHIPPED | OUTPATIENT
Start: 2021-12-06

## 2021-12-06 RX ORDER — FLUVOXAMINE MALEATE 100 MG
100 TABLET ORAL 2 TIMES DAILY
Qty: 60 TABLET | Refills: 0 | Status: SHIPPED | OUTPATIENT
Start: 2021-12-06

## 2021-12-06 RX ORDER — ZIPRASIDONE HYDROCHLORIDE 80 MG/1
80 CAPSULE ORAL 2 TIMES DAILY
Qty: 60 CAPSULE | Refills: 0 | Status: SHIPPED | OUTPATIENT
Start: 2021-12-06

## 2021-12-06 RX ORDER — CLONAZEPAM 1 MG/1
1 TABLET ORAL 2 TIMES DAILY
Qty: 60 TABLET | Refills: 0 | Status: SHIPPED | OUTPATIENT
Start: 2021-12-06

## 2021-12-06 RX ORDER — TRAMADOL HYDROCHLORIDE 50 MG/1
50 TABLET ORAL EVERY 8 HOURS PRN
Qty: 21 TABLET | Refills: 0 | Status: SHIPPED | OUTPATIENT
Start: 2021-12-06 | End: 2022-04-05

## 2021-12-06 RX ORDER — LAMOTRIGINE 150 MG/1
150 TABLET ORAL DAILY
Qty: 30 TABLET | Refills: 0 | Status: SHIPPED | OUTPATIENT
Start: 2021-12-06

## 2021-12-06 RX ORDER — PRAZOSIN HYDROCHLORIDE 2 MG/1
4 CAPSULE ORAL NIGHTLY
Qty: 120 CAPSULE | Refills: 0 | Status: SHIPPED | OUTPATIENT
Start: 2021-12-06

## 2021-12-06 RX ORDER — OLANZAPINE 5 MG/1
5 TABLET, ORALLY DISINTEGRATING ORAL NIGHTLY
Qty: 30 TABLET | Refills: 0 | Status: SHIPPED | OUTPATIENT
Start: 2021-12-06

## 2021-12-06 NOTE — TELEPHONE ENCOUNTER
Rx Refill Note  Requested Prescriptions     Pending Prescriptions Disp Refills   • ziprasidone (GEODON) 80 MG capsule 180 capsule 1     Sig: Take 1 capsule by mouth 2 (Two) Times a Day.   • traMADol (ULTRAM) 50 MG tablet 21 tablet 0     Sig: Take 1 tablet by mouth Every 8 (Eight) Hours As Needed for Moderate Pain .   • prazosin (MINIPRESS) 2 MG capsule 360 capsule 1     Sig: Take 2 capsules by mouth Every Night. 1-4 tablets nightly   • OLANZapine zydis (zyPREXA) 5 MG disintegrating tablet 90 tablet 1     Sig: Place 1 tablet on the tongue Every Night. dissolve one tablet by mouth for increase psychosis may repeat in 30 minutes if needed. Limit 2 tablets a day   • clonazePAM (KlonoPIN) 1 MG tablet 60 tablet 0     Sig: Take 1 tablet by mouth 2 (Two) Times a Day.   • cyclobenzaprine (FLEXERIL) 10 MG tablet 270 tablet 1     Sig: Take 1 tablet by mouth 3 (Three) Times a Day As Needed for Muscle Spasms.   • lamoTRIgine (LaMICtal) 150 MG tablet 90 tablet 1     Sig: Take 1 tablet by mouth Daily.   • ipratropium-albuterol (COMBIVENT RESPIMAT)  MCG/ACT inhaler 4 g 1     Sig: Inhale 1 puff 4 (Four) Times a Day As Needed for Wheezing.   • fluvoxaMINE (LUVOX) 100 MG tablet 90 tablet 1     Sig: Take 1 tablet by mouth 2 (Two) Times a Day.      Last office visit with prescribing clinician: 11/29/2021      Next office visit with prescribing clinician: Visit date not found            Seema Cabrales MA/LMR  12/06/21, 16:23 EST

## 2021-12-06 NOTE — TELEPHONE ENCOUNTER
In the future, ziprasidone, prazosin, Zyprexa, clonazepam, lamotrigine, and fluvoxamine should be followed by psychiatrist.

## 2021-12-06 NOTE — TELEPHONE ENCOUNTER
Caller: JOEL SUMNER    Relationship: Father    Best call back number: [phone]     Requested Prescriptions:   Requested Prescriptions     Pending Prescriptions Disp Refills   • ziprasidone (GEODON) 80 MG capsule       Sig: Take 1 capsule by mouth 2 (Two) Times a Day.   • traMADol (ULTRAM) 50 MG tablet 21 tablet 0     Sig: Take 1 tablet by mouth Every 8 (Eight) Hours As Needed for Moderate Pain .   • prazosin (MINIPRESS) 2 MG capsule       Sig: Take 2 capsules by mouth Every Night. 1-4 tablets nightly   • OLANZapine zydis (zyPREXA) 5 MG disintegrating tablet       Sig: Place 1 tablet on the tongue. dissolve one tablet by mouth for increase psychosis may repeat in 30 minutes if needed. Limit 2 tablets a day   • clonazePAM (KlonoPIN) 1 MG tablet       Sig: Take 1 tablet by mouth 2 (Two) Times a Day.   • cyclobenzaprine (FLEXERIL) 10 MG tablet       Sig: Take 1 tablet by mouth 3 (Three) Times a Day As Needed for Muscle Spasms.   • lamoTRIgine (LaMICtal) 150 MG tablet       Sig: Take 1 tablet by mouth Daily.   • ipratropium-albuterol (COMBIVENT RESPIMAT)  MCG/ACT inhaler       Sig: Inhale 1 puff 4 (Four) Times a Day As Needed for Wheezing.   • fluvoxaMINE (LUVOX) 100 MG tablet       Sig: Take 1 tablet by mouth 2 (Two) Times a Day.        Pharmacy where request should be sent: [address] - [phone] [facility] [phone] FX     Additional details provided by patient: PATIENT IS OUT OF MEDICATION   Does the patient have less than a 3 day supply:  [x] Yes  [] No    Adina Herrera Rep   12/06/21 15:23 EST

## 2021-12-08 ENCOUNTER — PATIENT MESSAGE (OUTPATIENT)
Dept: FAMILY MEDICINE CLINIC | Facility: CLINIC | Age: 46
End: 2021-12-08

## 2021-12-15 NOTE — PROGRESS NOTES
"Chief Complaint   Patient presents with   • Diarrhea           History of Present Illness  Patient is a 46-year-old female who presents today for follow-up. She was seen in the office November 2021 for diarrhea.  She had previously had an elevated fecal calprotectin.  At last office visit, lab work was obtained to assess for celiac disease which was negative and CRP was checked and was normal.  She was given prescriptions for dicyclomine and Xifaxan for IBS-D.    Patient presents today for follow-up.  Her insurance would not cover Xifaxan affordably.  She has been taking dicyclomine but has had no improvement in symptoms and continues to have persistent diarrhea on a daily basis.  Reports stool is watery.  She generally has 3 or more bowel movements per day.  She has nocturnal stools.  She denies any blood in the stool or associated abdominal pain.    She has had a cholecystectomy.         Result Review :       C-reactive Protein (11/19/2021 14:15)   Celiac Disease Panel (11/19/2021 14:15)   Office Visit with Marilyn Prado APRN (11/19/2021)   Ferritin (11/01/2021 16:00)   Iron Profile (11/01/2021 16:00)   Comprehensive metabolic panel (11/01/2021 16:00)   CBC w AUTO Differential (11/01/2021 16:00)   Referral to Gastroenterology for Chronic diarrhea (11/01/2021)       Vital Signs:   /90   Pulse 64   Temp 96.8 °F (36 °C)   Ht 167.6 cm (66\")   Wt 112 kg (248 lb)   SpO2 98%   BMI 40.03 kg/m²     Body mass index is 40.03 kg/m².     Physical Exam  Vitals reviewed.   Constitutional:       General: She is not in acute distress.     Appearance: She is well-developed.   HENT:      Head: Normocephalic and atraumatic.   Pulmonary:      Effort: Pulmonary effort is normal. No respiratory distress.   Abdominal:      General: Abdomen is flat. Bowel sounds are normal. There is no distension.      Palpations: Abdomen is soft.      Tenderness: There is no abdominal tenderness.   Skin:     General: Skin is dry.      " Coloration: Skin is not pale.   Neurological:      Mental Status: She is alert and oriented to person, place, and time.   Psychiatric:         Thought Content: Thought content normal.           Assessment and Plan    Diagnoses and all orders for this visit:    1. Diarrhea, unspecified type (Primary)  -     Calprotectin, Fecal - Stool, Per Rectum  -     Giardia Antigen - Stool, Per Rectum  -     Stool Culture (Reference Lab) - Stool, Per Rectum  -     Clostridium Difficile Toxin, PCR - Stool, Per Rectum    Other orders  -     colestipol (COLESTID) 1 g tablet; Take 1 tablet by mouth 2 (Two) Times a Day. If no improvement after 1 week, may increase to 2 tablets twice daily.  Dispense: 120 tablet; Refill: 5         Discussion  Patient presents today for follow-up with persistent diarrhea.  She reports no improvement with dicyclomine.  We will have her submit stool sample to assess for infection and to reassess fecal calprotectin.  If fecal calprotectin is elevated or if symptoms do not improve with use of colestipol, to consider updated colonoscopy with rebiopsy of colon.          Follow Up   Return if symptoms worsen or fail to improve.    Patient Instructions   Obtain stool studies to assess for infection and to reassess fecal calprotectin.    For diarrhea, begin trial of colestipol as prescribed.  Prescription sent to pharmacy.

## 2021-12-17 ENCOUNTER — OFFICE VISIT (OUTPATIENT)
Dept: GASTROENTEROLOGY | Facility: CLINIC | Age: 46
End: 2021-12-17

## 2021-12-17 VITALS
BODY MASS INDEX: 39.86 KG/M2 | TEMPERATURE: 96.8 F | DIASTOLIC BLOOD PRESSURE: 90 MMHG | SYSTOLIC BLOOD PRESSURE: 150 MMHG | HEART RATE: 64 BPM | HEIGHT: 66 IN | WEIGHT: 248 LBS | OXYGEN SATURATION: 98 %

## 2021-12-17 DIAGNOSIS — R19.7 DIARRHEA, UNSPECIFIED TYPE: Primary | ICD-10-CM

## 2021-12-17 PROCEDURE — 99214 OFFICE O/P EST MOD 30 MIN: CPT | Performed by: NURSE PRACTITIONER

## 2021-12-17 RX ORDER — MONTELUKAST SODIUM 4 MG/1
1 TABLET, CHEWABLE ORAL 2 TIMES DAILY
Qty: 120 TABLET | Refills: 5 | Status: ON HOLD | OUTPATIENT
Start: 2021-12-17 | End: 2022-03-21

## 2021-12-17 NOTE — PATIENT INSTRUCTIONS
Obtain stool studies to assess for infection and to reassess fecal calprotectin.    For diarrhea, begin trial of colestipol as prescribed.  Prescription sent to pharmacy.

## 2021-12-20 ENCOUNTER — TELEPHONE (OUTPATIENT)
Dept: FAMILY MEDICINE CLINIC | Facility: CLINIC | Age: 46
End: 2021-12-20

## 2021-12-23 ENCOUNTER — HOSPITAL ENCOUNTER (OUTPATIENT)
Dept: ULTRASOUND IMAGING | Facility: HOSPITAL | Age: 46
Discharge: HOME | End: 2021-12-23
Admitting: NURSE PRACTITIONER

## 2021-12-23 DIAGNOSIS — R79.89 ELEVATED SERUM CREATININE: ICD-10-CM

## 2021-12-23 PROCEDURE — 76775 US EXAM ABDO BACK WALL LIM: CPT

## 2021-12-24 LAB
BACTERIA SPEC CULT: NORMAL
BACTERIA SPEC CULT: NORMAL
C DIFF TOX GENS STL QL NAA+PROBE: NEGATIVE
CALPROTECTIN STL-MCNT: 35 UG/G (ref 0–120)
CAMPYLOBACTER STL CULT: NORMAL
E COLI SXT STL QL IA: NEGATIVE
G LAMBLIA AG STL QL IA: NEGATIVE
SALM + SHIG STL CULT: NORMAL

## 2021-12-29 DIAGNOSIS — R82.90 ABNORMAL URINE: Primary | ICD-10-CM

## 2022-01-05 ENCOUNTER — TELEPHONE (OUTPATIENT)
Dept: FAMILY MEDICINE CLINIC | Facility: CLINIC | Age: 47
End: 2022-01-05

## 2022-01-05 NOTE — TELEPHONE ENCOUNTER
Called pt.  Spoke to pt's father.  Needs to schedule f/u lab for urine within 2-3 or around 2/1/22.

## 2022-03-09 ENCOUNTER — CLINICAL SUPPORT (OUTPATIENT)
Dept: ORTHOPEDIC SURGERY | Facility: CLINIC | Age: 47
End: 2022-03-09

## 2022-03-09 VITALS — HEIGHT: 66 IN | WEIGHT: 266 LBS | TEMPERATURE: 96 F | RESPIRATION RATE: 18 BRPM | BODY MASS INDEX: 42.75 KG/M2

## 2022-03-09 DIAGNOSIS — M17.0 ARTHRITIS OF BOTH KNEES: Primary | ICD-10-CM

## 2022-03-09 PROCEDURE — 99213 OFFICE O/P EST LOW 20 MIN: CPT | Performed by: ORTHOPAEDIC SURGERY

## 2022-03-09 PROCEDURE — 20610 DRAIN/INJ JOINT/BURSA W/O US: CPT | Performed by: ORTHOPAEDIC SURGERY

## 2022-03-09 RX ORDER — METHYLPREDNISOLONE ACETATE 80 MG/ML
80 INJECTION, SUSPENSION INTRA-ARTICULAR; INTRALESIONAL; INTRAMUSCULAR; SOFT TISSUE
Status: COMPLETED | OUTPATIENT
Start: 2022-03-09 | End: 2022-03-09

## 2022-03-09 RX ADMIN — METHYLPREDNISOLONE ACETATE 80 MG: 80 INJECTION, SUSPENSION INTRA-ARTICULAR; INTRALESIONAL; INTRAMUSCULAR; SOFT TISSUE at 15:19

## 2022-03-09 RX ADMIN — METHYLPREDNISOLONE ACETATE 80 MG: 80 INJECTION, SUSPENSION INTRA-ARTICULAR; INTRALESIONAL; INTRAMUSCULAR; SOFT TISSUE at 15:18

## 2022-03-09 NOTE — PROGRESS NOTES
The patient has a pain history of the following:  Chronic pain bilateral knees   Osgood-Schlatter disease     Previous interventions that the patient has received include:   Knee steroid injections 3/9/22 - helps some    Pain medications include:  Flexeril  Tramadol     Previously: Voltaren gel, CBD/THC ointments, THC prescribed while living in [place]  **Gastric sleeve    Other conservative modalities which the patient reports using include:  Physical Therapy: yes  Chiropractor: no  Massage Therapy: no  TENS: yes  Neck or back surgery: no  Past pain management: no    Past Significant Surgical History:  Right knee surgery    HPI:       CHIEF COMPLAINT: Knee Pain    Aurora Bull is a 46 y.o. female referred here by MJ Cadet. Aurora Bull presents to the office for evaluation and treatment of Knee Pain      Onset:  Whole life  Inciting Event:  Worsened as teen after fall off a horse  Location:  Bilateral knees   Pain: Pain described as ache, sharp and squeezing. Located in bilateral knees and does not radiate up or down the legs.  Severity:  Pain rated as a 8 /10.  Symptoms have been constant.  Exacerbation:  Movement.   Alleviation:  Lying in bed.  Associated Symptoms:  Denies weakness in the legs.   Ambulates: Without assistive device, but was told she should be using a cane.     She has a service dog with her today.        PEG Assessment   What number best describes your pain on average in the past week?8  What number best describes how, during the past week, pain has interfered with your enjoyment of life?10  What number best describes how, during the past week, pain has interfered with your general activity?  6        Current Outpatient Medications:   •  clonazePAM (KlonoPIN) 1 MG tablet, Take 1 tablet by mouth 2 (Two) Times a Day., Disp: 60 tablet, Rfl: 0  •  colestipol (COLESTID) 1 g tablet, Take 1 tablet by mouth 2 (Two) Times a Day. If no improvement after 1 week, may increase to 2 tablets  "twice daily., Disp: 120 tablet, Rfl: 5  •  cyclobenzaprine (FLEXERIL) 10 MG tablet, Take 1 tablet by mouth 3 (Three) Times a Day As Needed for Muscle Spasms., Disp: 90 tablet, Rfl: 0  •  dicyclomine (BENTYL) 10 MG capsule, Take 1 capsule by mouth 3 (Three) Times a Day As Needed (abdominal pain/diarrhea)., Disp: 90 capsule, Rfl: 5  •  fluvoxaMINE (LUVOX) 100 MG tablet, Take 1 tablet by mouth 2 (Two) Times a Day., Disp: 60 tablet, Rfl: 0  •  ipratropium-albuterol (COMBIVENT RESPIMAT)  MCG/ACT inhaler, Inhale 1 puff 4 (Four) Times a Day As Needed for Wheezing., Disp: 4 g, Rfl: 1  •  lamoTRIgine (LaMICtal) 150 MG tablet, Take 1 tablet by mouth Daily., Disp: 30 tablet, Rfl: 0  •  OLANZapine zydis (zyPREXA) 5 MG disintegrating tablet, Place 1 tablet on the tongue Every Night. dissolve one tablet by mouth for increase psychosis may repeat in 30 minutes if needed. Limit 2 tablets a day, Disp: 30 tablet, Rfl: 0  •  prazosin (MINIPRESS) 2 MG capsule, Take 2 capsules by mouth Every Night. 1-4 tablets nightly, Disp: 120 capsule, Rfl: 0  •  ziprasidone (GEODON) 80 MG capsule, Take 1 capsule by mouth 2 (Two) Times a Day., Disp: 60 capsule, Rfl: 0  •  traMADol (ULTRAM) 50 MG tablet, Take 1 tablet by mouth Every 8 (Eight) Hours As Needed for Moderate Pain ., Disp: 21 tablet, Rfl: 0    The following portions of the patient's history were reviewed and updated as appropriate: allergies, current medications, past family history, past medical history, past social history, past surgical history and problem list.      REVIEW OF PERTINENT MEDICAL DATA    11/10/21 Office note \"   Radiology:   AP lateral 40 degree PA x-ray bilateral knees taken the office today for complaints of pain without comparison views show advanced arthritic change with large osteophytes in both knees\"    11/1/21 Creatinine 1.12, Platelets 243 (10*3)    Review of Systems   Constitutional: Negative for fatigue.   HENT: Negative for congestion.    Eyes: Negative " "for visual disturbance.   Respiratory: Negative for shortness of breath.    Cardiovascular: Negative for chest pain.   Gastrointestinal: Positive for diarrhea. Negative for constipation.   Genitourinary: Negative for difficulty urinating.   Musculoskeletal: Positive for arthralgias (bilateral knees).   Neurological: Negative for weakness and numbness.   Psychiatric/Behavioral: Positive for sleep disturbance. Negative for suicidal ideas. The patient is nervous/anxious.      I have reviewed and confirmed the accuracy of the ROS as documented by the MA/LPN/RN Emily Smith MD      Vitals:    03/10/22 1437   BP: 125/91   Pulse: 78   Resp: 18   Temp: 97.1 °F (36.2 °C)   SpO2: 96%   Weight: 120 kg (264 lb 6.4 oz)   Height: 167.6 cm (66\")   PainSc:   8   PainLoc: Knee         Objective   Physical Exam  Vitals reviewed.   Constitutional:       General: She is not in acute distress.  Pulmonary:      Effort: Pulmonary effort is normal. No respiratory distress.   Musculoskeletal:      Comments: Knees:  Tenderness distal to the patella bilaterally.  Medial joint line tenderness bilaterally.  Mild crepitus in the right knee.     Skin:     General: Skin is warm and dry.   Neurological:      General: No focal deficit present.      Mental Status: She is alert.   Psychiatric:         Mood and Affect: Mood normal.         Thought Content: Thought content normal.         Assessment/Plan   Diagnoses and all orders for this visit:    1. Chronic pain of both knees (Primary)  -     Case Request    2. Osteoarthritis of both knees, unspecified osteoarthritis type  -     Case Request        - Pertinent labs reviewed.   - Pertinent imaging reviewed.   - Will schedule for bilateral genicular nerve block.  If good results, will move toward genicular Radiofrequency ablation (thermal, non-pulsed).  Risks discussed including but not limited to bleeding, bruising, infection, damage to surrounding structures, headache, and rare things such as " being paralyzed, seizure, stroke, heart attack and death.    - Explained that opioid medications will be our last resort.  She understands the risks and negatives about starting an opioid medication.    - Aurora Bull reports a pain score of 8.  Given her pain assessment as noted, treatment options were discussed and the following options were decided upon as a follow-up plan to address the patient's pain: procedure.    --- Follow-up for bilateral diagnostic genicular nerve block and office visit 1-2 weeks after.           While examining this patient, I wore protective equipment including a mask, eye shield and gloves.  I washed my hands before and after this patient encounter.  The patient wore a mask throughout the visit as well.     Emily Smith MD  Pain Management

## 2022-03-09 NOTE — PROGRESS NOTES
Large Joint Arthrocentesis: R knee  Date/Time: 3/9/2022 3:18 PM  Consent given by: patient  Site marked: site marked  Timeout: Immediately prior to procedure a time out was called to verify the correct patient, procedure, equipment, support staff and site/side marked as required   Supporting Documentation  Indications: pain and joint swelling   Procedure Details  Location: knee - R knee  Preparation: Patient was prepped and draped in the usual sterile fashion  Needle size: 22 G  Approach: anterolateral  Medications administered: 80 mg methylPREDNISolone acetate 80 MG/ML; 4 mL lidocaine (cardiac)  Patient tolerance: patient tolerated the procedure well with no immediate complications    Large Joint Arthrocentesis: L knee  Date/Time: 3/9/2022 3:19 PM  Consent given by: patient  Site marked: site marked  Timeout: Immediately prior to procedure a time out was called to verify the correct patient, procedure, equipment, support staff and site/side marked as required   Supporting Documentation  Indications: pain and joint swelling   Procedure Details  Location: knee - L knee  Preparation: Patient was prepped and draped in the usual sterile fashion  Needle size: 22 G  Approach: anterolateral  Medications administered: 80 mg methylPREDNISolone acetate 80 MG/ML; 4 mL lidocaine (cardiac)  Patient tolerance: patient tolerated the procedure well with no immediate complications        Aurora follows up today and is seen with her service animal.  She reports that she is doing relatively well.  She lives here and in [place] where she has horses and recently went to visit the birth of a new horse there is going back for a couple more in a few months.  Want to know what her options was asking about the possibility of surgery etc.  He has done physical therapy in the past.  She is encouraged to do home exercise program.  Her current BMI is 42.93.  Says she is tried some anti-inflammatories but trying to stay away from them.  She tries  to walk but she is scared she cannot get around the block.  I spoke to her about using trekking poles etc. to help her with it.  She also gets hip and back pain.  Mor today shows pleasant lady she she is above ideal body weight she has crepitation synovitis swelling in both knees some joint line tenderness.  X-rays from November AP lateral 40 degree PA x-ray bilateral knees taken the office for pain at that time without comparison view shows arthritis Lano be to inject her today I want her to use ice heat and liniments is trying to stay away from anti-inflammatories and will try injections which she says she tolerated well last time.  I encouraged her to do home program of physical therapy exercises and talked to her about milestones for therapy.  I had like to at least get her into her 50s and she would need to get her body mass index down to safe levels and answered her questions

## 2022-03-10 ENCOUNTER — OFFICE VISIT (OUTPATIENT)
Dept: PAIN MEDICINE | Facility: CLINIC | Age: 47
End: 2022-03-10

## 2022-03-10 VITALS
RESPIRATION RATE: 18 BRPM | HEART RATE: 78 BPM | HEIGHT: 66 IN | OXYGEN SATURATION: 96 % | WEIGHT: 264.4 LBS | TEMPERATURE: 97.1 F | BODY MASS INDEX: 42.49 KG/M2 | SYSTOLIC BLOOD PRESSURE: 125 MMHG | DIASTOLIC BLOOD PRESSURE: 91 MMHG

## 2022-03-10 DIAGNOSIS — M25.562 CHRONIC PAIN OF BOTH KNEES: Primary | ICD-10-CM

## 2022-03-10 DIAGNOSIS — M25.561 CHRONIC PAIN OF BOTH KNEES: Primary | ICD-10-CM

## 2022-03-10 DIAGNOSIS — G89.29 CHRONIC PAIN OF BOTH KNEES: Primary | ICD-10-CM

## 2022-03-10 DIAGNOSIS — M17.0 OSTEOARTHRITIS OF BOTH KNEES, UNSPECIFIED OSTEOARTHRITIS TYPE: ICD-10-CM

## 2022-03-10 PROCEDURE — 99204 OFFICE O/P NEW MOD 45 MIN: CPT | Performed by: ANESTHESIOLOGY

## 2022-03-10 NOTE — PATIENT INSTRUCTIONS
"What to expect if we're setting up an injection/procedure    - I have placed the order today, we'll start speaking to your insurance for authorization (this can sometimes take a few weeks).   - You should be scheduled for your procedure before you leave the office.  If you were not, please call our office to schedule.   - A COVID test may be required for your procedure.  We will let you know if this needs to be scheduled.  - LIGHT Intravenous (IV) sedation is offered for some procedures: you will NOT be put to sleep.  If you plan to have sedation, do not eat or drink anything on the day of your injection.   - Most procedures require having someone drive you.  Please make sure you arrange a  unless told otherwise.   - If you take a blood thinner and you were not instructed whether to continue or hold it, please contact us with any questions.       Radiofrequency ablation (RFA):     - Radiofrequency ablation is a term used to describe cauterization or \"burning.\"   - In pain management, we can use this technique with a special needle to target and destroy areas that are causing your pain.   - In most cases, you must have TWO successful \"test injections\" before you are a candidate for RFA.    After your RFA:   - Because heat is used in this technique, it is common to have soreness after the procedure.  Sometimes \"neuritis\" occurs, which feels like tingling, prickly, or sunburn under the skin sensations.   - Ice packs are helpful in decreasing this soreness and preventing post-procedure \"neuritis\" pain.  Use an ice pack for 20 minutes at a time at least 3 times the day of and the day after your procedure.   - It is common to have arm/leg numbness or weakness the day of your procedure, but this should wear off by the following day.   - It may take up to 6 weeks to gain full benefit from this procedure.  "

## 2022-03-11 ENCOUNTER — HOSPITAL ENCOUNTER (OUTPATIENT)
Dept: CARDIOLOGY | Facility: HOSPITAL | Age: 47
Discharge: HOME | End: 2022-03-11
Admitting: INTERNAL MEDICINE

## 2022-03-11 ENCOUNTER — OFFICE VISIT (OUTPATIENT)
Dept: FAMILY MEDICINE CLINIC | Facility: CLINIC | Age: 47
End: 2022-03-11

## 2022-03-11 ENCOUNTER — TRANSCRIBE ORDERS (OUTPATIENT)
Dept: SURGERY | Facility: SURGERY CENTER | Age: 47
End: 2022-03-11

## 2022-03-11 VITALS
SYSTOLIC BLOOD PRESSURE: 138 MMHG | WEIGHT: 265 LBS | RESPIRATION RATE: 16 BRPM | DIASTOLIC BLOOD PRESSURE: 96 MMHG | OXYGEN SATURATION: 99 % | BODY MASS INDEX: 42.77 KG/M2 | HEART RATE: 65 BPM | TEMPERATURE: 97.7 F

## 2022-03-11 VITALS
RESPIRATION RATE: 18 BRPM | HEART RATE: 68 BPM | OXYGEN SATURATION: 97 % | SYSTOLIC BLOOD PRESSURE: 133 MMHG | DIASTOLIC BLOOD PRESSURE: 87 MMHG

## 2022-03-11 DIAGNOSIS — R07.2 PRECORDIAL PAIN: ICD-10-CM

## 2022-03-11 DIAGNOSIS — Z41.9 SURGERY, ELECTIVE: Primary | ICD-10-CM

## 2022-03-11 DIAGNOSIS — R07.9 CHEST PAIN, UNSPECIFIED TYPE: Primary | ICD-10-CM

## 2022-03-11 DIAGNOSIS — R79.89 ELEVATED SERUM CREATININE: ICD-10-CM

## 2022-03-11 DIAGNOSIS — R06.02 SHORTNESS OF BREATH: ICD-10-CM

## 2022-03-11 DIAGNOSIS — R94.31 ABNORMAL EKG: ICD-10-CM

## 2022-03-11 DIAGNOSIS — R82.90 ABNORMAL URINE: ICD-10-CM

## 2022-03-11 PROBLEM — M17.0 OSTEOARTHRITIS OF BOTH KNEES: Status: ACTIVE | Noted: 2022-03-11

## 2022-03-11 LAB
D DIMER PPP FEU-MCNC: 0.39 MCGFEU/ML (ref 0–0.49)
TROPONIN T SERPL-MCNC: <0.01 NG/ML (ref 0–0.03)

## 2022-03-11 PROCEDURE — 85379 FIBRIN DEGRADATION QUANT: CPT | Performed by: INTERNAL MEDICINE

## 2022-03-11 PROCEDURE — 99214 OFFICE O/P EST MOD 30 MIN: CPT | Performed by: NURSE PRACTITIONER

## 2022-03-11 PROCEDURE — 36415 COLL VENOUS BLD VENIPUNCTURE: CPT

## 2022-03-11 PROCEDURE — 94760 N-INVAS EAR/PLS OXIMETRY 1: CPT

## 2022-03-11 PROCEDURE — 93000 ELECTROCARDIOGRAM COMPLETE: CPT | Performed by: NURSE PRACTITIONER

## 2022-03-11 PROCEDURE — 84484 ASSAY OF TROPONIN QUANT: CPT | Performed by: INTERNAL MEDICINE

## 2022-03-11 PROCEDURE — 99204 OFFICE O/P NEW MOD 45 MIN: CPT | Performed by: INTERNAL MEDICINE

## 2022-03-11 RX ORDER — NITROGLYCERIN 0.4 MG/1
0.4 TABLET SUBLINGUAL
Status: DISCONTINUED | OUTPATIENT
Start: 2022-03-11 | End: 2022-04-05

## 2022-03-11 RX ORDER — SODIUM CHLORIDE 0.9 % (FLUSH) 0.9 %
10 SYRINGE (ML) INJECTION AS NEEDED
Status: DISCONTINUED | OUTPATIENT
Start: 2022-03-11 | End: 2022-04-05

## 2022-03-11 NOTE — PROGRESS NOTES
PATIENTINFORMATION    Date of Office Visit: 2022  Encounter Provider: MIGUEL CPU  Place of Service: [facility]  Patient Name: Aurora Bull  : 1975    Subjective:     Encounter Date:2022      Patient ID: Aurora Bull is a 46 y.o. female.    Chief Complaint   Patient presents with   • Shortness of Breath   • Chest Pain     HPI  Ms. Bull is a 47 yo lady with past medical history of depression and schizoaffective disorder, degenerative joint disease that interferes with ambulation, obesity referred to cardiology clinic for evaluation of chest pain.  She had 2 episodes of left anterior chest pressure-like pain radiating to the back that started while she was seated on a couch.  Episodes lasted for about 15 minutes associated with some shortness of breath but completely resolved.  No recurrence of chest pain today.  She went to see her PCP and she had a EKG in the office.  Patient denies association of pain to exertion or walking.  She denies any significant cough, orthopnea, PND, palpitations, presyncope syncope or significant lower extremity swelling.  No prior known cardiovascular illness.      ROS  All systems reviewed and negative except as noted in HPI.    Past Medical History:   Diagnosis Date   • Asthma    • Chronic diarrhea    • Chronic knee pain     bilateral   • Chronic lower back pain    • Depression    • Dislocated elbow    • Kidney stones    • Knee swelling    • Low back strain    • Neck strain 2017   • Osgood-Schlatter's disease    • PTSD (post-traumatic stress disorder)    • Schizoaffective disorder (HCC)        Past Surgical History:   Procedure Laterality Date   • ADENOIDECTOMY     • CARDIAC CATHETERIZATION     •  SECTION     • CHOLECYSTECTOMY  2018   • COLONOSCOPY  2019   • ENDOSCOPY  2018   • GASTRIC SLEEVE LAPAROSCOPIC  2014   • HYSTERECTOMY  2019   • KNEE CARTILAGE SURGERY Right 2015   • KNEE SURGERY  Right 2016    scope   • TONSILLECTOMY  1988   • UPPER GASTROINTESTINAL ENDOSCOPY         Social History     Socioeconomic History   • Marital status:    Tobacco Use   • Smoking status: Former Smoker     Packs/day: 1.50     Years: 24.00     Pack years: 36.00     Types: Cigarettes   • Smokeless tobacco: Never Used   • Tobacco comment: Quit in 2014   Vaping Use   • Vaping Use: Never used   Substance and Sexual Activity   • Alcohol use: Yes     Alcohol/week: 2.0 standard drinks     Types: 2 Shots of liquor per week     Comment: 2 shots nights   • Drug use: Not Currently   • Sexual activity: Yes       Family History   Problem Relation Age of Onset   • Diabetes Mother    • Diabetes Father    • Hyperlipidemia Father    • Heart disease Father    • Hypertension Father    • Hyperlipidemia Brother    • Diabetes Brother    • Heart block Brother    • Scoliosis Son    • Diabetes Maternal Aunt    • Dwarfism Maternal Uncle    • Hypertension Paternal Uncle    • Mental illness Maternal Grandmother    • Diabetes Maternal Grandmother    • Memory loss Maternal Grandmother    • Esophageal cancer Maternal Grandfather    • Alzheimer's disease Paternal Grandmother    • Hypertension Paternal Grandmother    • Hypertension Paternal Grandfather    • Heart murmur Paternal Grandfather    • Seasonal affective disorder Brother    • Drug abuse Brother    • Mental illness Brother    • Colon cancer Neg Hx    • Colon polyps Neg Hx    • Crohn's disease Neg Hx    • Irritable bowel syndrome Neg Hx    • Ulcerative colitis Neg Hx          Procedures       Objective:     /87 (BP Location: Right arm, Patient Position: Sitting)   Pulse 68   Resp 18   SpO2 97%  There is no height or weight on file to calculate BMI.     Constitutional:       General: Not in acute distress.     Appearance: Morbidly obese. Not diaphoretic.   Eyes:      Pupils: Pupils are equal, round, and reactive to light.   HENT:      Head: Normocephalic and atraumatic.   Neck:       Thyroid: No thyromegaly.   Pulmonary:      Effort: Pulmonary effort is normal. No respiratory distress.      Breath sounds: Normal breath sounds. No wheezing. No rales.   Chest:      Chest wall: Not tender to palpatation.   Cardiovascular:      Normal rate. Regular rhythm.      No gallop.   Pulses:     Intact distal pulses.   Edema:     Peripheral edema absent.   Abdominal:      General: Bowel sounds are normal. There is no distension.      Palpations: Abdomen is soft.      Tenderness: There is no guarding.   Musculoskeletal: Normal range of motion.         General: No deformity.      Cervical back: Normal range of motion and neck supple. Skin:     General: Skin is warm and dry.      Findings: No rash.   Neurological:      Mental Status: Alert and oriented to person, place, and time.      Cranial Nerves: No cranial nerve deficit.      Deep Tendon Reflexes: Reflexes are normal and symmetric.   Psychiatric:         Judgment: Judgment normal.         Review Of Data: I have reviewed pertinent recent labs, images and documents and pertinent findings included in HPI or assessment below.          Assessment/Plan:         1.  Two episodes of chest pain while at rest last night.  Pain description concerning for angina with pressure-like feeling.  Associated she has shortness of breath.  No recurrence of chest pain today.  I reviewed EKG did not office visit and unremarkable.  Patient subsequently had troponin and D-dimer at the [facility] today that were normal.   She was chest pain-free and all vital signs within range during exam  I will schedule patient for a stress test next week  Patient will go to the ER with recurrent or persistent chest discomfort.    2.  Morbidly obese with degenerative joint disease interfering with walking  3.  Depression and has schizoaffective disorder on treatment      Diagnosis and plan of care discussed with patient and verbalized understanding.            Your medication list      ASK your  doctor about these medications      Instructions Last Dose Given Next Dose Due   clonazePAM 1 MG tablet  Commonly known as: KlonoPIN      Take 1 tablet by mouth 2 (Two) Times a Day.       colestipol 1 g tablet  Commonly known as: COLESTID      Take 1 tablet by mouth 2 (Two) Times a Day. If no improvement after 1 week, may increase to 2 tablets twice daily.       cyclobenzaprine 10 MG tablet  Commonly known as: FLEXERIL      Take 1 tablet by mouth 3 (Three) Times a Day As Needed for Muscle Spasms.       dicyclomine 10 MG capsule  Commonly known as: BENTYL      Take 1 capsule by mouth 3 (Three) Times a Day As Needed (abdominal pain/diarrhea).       fluvoxaMINE 100 MG tablet  Commonly known as: LUVOX      Take 1 tablet by mouth 2 (Two) Times a Day.       ipratropium-albuterol  MCG/ACT inhaler  Commonly known as: COMBIVENT RESPIMAT      Inhale 1 puff 4 (Four) Times a Day As Needed for Wheezing.       lamoTRIgine 150 MG tablet  Commonly known as: LaMICtal      Take 1 tablet by mouth Daily.       OLANZapine zydis 5 MG disintegrating tablet  Commonly known as: zyPREXA      Place 1 tablet on the tongue Every Night. dissolve one tablet by mouth for increase psychosis may repeat in 30 minutes if needed. Limit 2 tablets a day       prazosin 2 MG capsule  Commonly known as: MINIPRESS      Take 2 capsules by mouth Every Night. 1-4 tablets nightly       traMADol 50 MG tablet  Commonly known as: ULTRAM      Take 1 tablet by mouth Every 8 (Eight) Hours As Needed for Moderate Pain .       ziprasidone 80 MG capsule  Commonly known as: GEODON      Take 1 capsule by mouth 2 (Two) Times a Day.                  Asif Gold MD  03/11/22  16:38 EST

## 2022-03-11 NOTE — PROGRESS NOTES
Chief Complaint  Chest Pain (For the last 3 days feels tight.Radiates to mid back. Also discuss labs.)    Subjective          Aurora Bull presents to [facility]  History of Present Illness  Aurora Bull is a 46 y.o. female who presents to the clinic today with complaints of chest pain for the last 3 days.  Her chest pain has been off and on and occurring at different times of the day.  Her chest pain episodes have progressively gotten longer.  Her episodes occur at different times of the day and are not related to food.  The first time she experienced her symptoms, she thought it was related to acid reflux as her chest pain occurred after eating and she vomited.  However, patient has had episodes of chest pain without vomiting and it has occurred at different times of the day.  Yesterday, her episode of chest pain was 5 hours after eating.  She also had an episode where she was in the car and experienced chest pain while sitting.  She states this episode lasted 15 minutes.  She denies diaphoresis, jaw pain, or shortness of breath.  She states the chest pain is localized to the left side and radiates to her central back.  She describes the pain as an intense tightening.  No recent upper respitory infection. She denies andominal pain.  She denies recent trauma or recent heavy lifting or more activity than normal.  She denies starting any new medications.  She denies changes in her diet.  Her chest is not tender to the touch.  Her pain is not worsened or improved with sitting up or lying down.  She denies leg swelling.  She did have her heart evaluated in the past for an abnormal EKG.  She states that she underwent a cardiac cath and was told that there was some narrowing, but no severe findings.  She has not been checking her blood pressure at home.  She denies worsening anxiety.  She denies heart palpitations.    Review of Systems   Constitutional: Negative.    HENT: Negative.     Eyes: Negative.    Respiratory: Negative.    Cardiovascular: Positive for chest pain.   Gastrointestinal: Negative.    Endocrine: Negative.    Genitourinary: Negative.    Musculoskeletal: Positive for arthralgias.   Skin: Negative.    Neurological: Negative.    Hematological: Negative.    Psychiatric/Behavioral: Negative.       Objective   Vital Signs:   /96   Pulse 65   Temp 97.7 °F (36.5 °C)   Resp 16   Wt 120 kg (265 lb)   SpO2 99%   BMI 42.77 kg/m²     Physical Exam  Vitals reviewed.   Constitutional:       General: She is not in acute distress.     Appearance: Normal appearance. She is obese. She is not ill-appearing, toxic-appearing or diaphoretic.   HENT:      Head: Normocephalic and atraumatic.   Neck:      Vascular: No JVD.   Cardiovascular:      Rate and Rhythm: Normal rate. Rhythm irregular.      Heart sounds: No murmur heard.    No friction rub.   Pulmonary:      Effort: No respiratory distress.      Breath sounds: Normal breath sounds. No wheezing.   Chest:      Chest wall: No swelling, tenderness or crepitus.      Comments: Patient's chest pain is not reproduced with crowing rooster maneuver or cross chest adduction maneuver.  Abdominal:      General: Bowel sounds are normal. There is no distension.      Palpations: Abdomen is soft.      Tenderness: There is no abdominal tenderness.   Musculoskeletal:      Right lower leg: No edema.      Left lower leg: No edema.   Neurological:      General: No focal deficit present.      Mental Status: She is alert and oriented to person, place, and time.      Motor: No weakness.      Gait: Gait normal.   Psychiatric:         Mood and Affect: Mood normal.         Behavior: Behavior normal.        Result Review :{Labs  Result Review  Imaging  Med Tab  Media  Procedures :23}          SCANNED EKG    Date/Time: 3/11/2022 2:49 PM  Performed by: Sonia Christiansen APRN  Authorized by: Sonia Christiansen APRN   Comments: Sinus rhythm, intra-atrial conduction  delay, there was artifact in part of the EKG reading      ECG 12 Lead    Date/Time: 3/11/2022 2:54 PM  Performed by: Sonia Christiansen APRN  Authorized by: Sonia Christiansen APRN   Comparison: not compared with previous ECG   Rhythm: sinus rhythm    Clinical impression: poor quality tracing that precludes reliable interpretation - Repeat ECG  Comments: There was presence of intra-atrial conduction delay.              Assessment and Plan    Diagnoses and all orders for this visit:    1. Chest pain, unspecified type (Primary)  -     Ambulatory Referral Chest Pain Clinic  -     ECG 12 Lead    2. Abnormal EKG  -     Ambulatory Referral Chest Pain Clinic    3. Abnormal urine  -     Urinalysis With Microscopic - Urine, Clean Catch  -     Urinalysis With Culture If Indicated -    4. Elevated serum creatinine  -     Comprehensive metabolic panel      It is unclear at this time what could be causing patient's chest pain.  It does not appear to be related to acid reflux based on the timing of her symptoms. It does not appear to be related to costochondritis based on physical examination and HPI.  There is not presence of friction rub on auscultation or JVD on physical exam.  Patient's heart rhythm is irregular today.  EKG did show intra-atrial conduction delay.  Based on review of her medical record, it does appear she underwent a cardiac cath in 2017, but no further details are attached.  Based on her symptoms and medical history, she was referred to the chest pain clinic today.  She will go to this office after she leaves here.    Of note, patient did have an abnormal urinalysis and elevated serum creatinine on her last collection.  For this reason, CMP and repeat urinalysis have been ordered today.    Follow Up   Return in about 3 weeks (around 4/1/2022) for Recheck.  Patient was given instructions and counseling regarding her condition or for health maintenance advice. Please see specific information pulled into the AVS if  appropriate.     Electronically signed by MJ Valadez, 03/11/22, 2:53 PM EST.

## 2022-03-16 LAB
ALBUMIN SERPL-MCNC: 4.2 G/DL (ref 3.8–4.8)
ALBUMIN/GLOB SERPL: 1.4 {RATIO} (ref 1.2–2.2)
ALP SERPL-CCNC: 58 IU/L (ref 44–121)
ALT SERPL-CCNC: 13 IU/L (ref 0–32)
APPEARANCE UR: CLEAR
AST SERPL-CCNC: 13 IU/L (ref 0–40)
BACTERIA #/AREA URNS HPF: NORMAL /[HPF]
BACTERIA UR CULT: NORMAL
BACTERIA UR CULT: NORMAL
BILIRUB SERPL-MCNC: 0.7 MG/DL (ref 0–1.2)
BILIRUB UR QL STRIP: NEGATIVE
BUN SERPL-MCNC: 17 MG/DL (ref 6–24)
BUN/CREAT SERPL: 19 (ref 9–23)
CALCIUM SERPL-MCNC: 9.1 MG/DL (ref 8.7–10.2)
CASTS URNS QL MICRO: NORMAL /LPF
CHLORIDE SERPL-SCNC: 103 MMOL/L (ref 96–106)
CO2 SERPL-SCNC: 23 MMOL/L (ref 20–29)
COLOR UR: YELLOW
CREAT SERPL-MCNC: 0.91 MG/DL (ref 0.57–1)
EGFRCR SERPLBLD CKD-EPI 2021: 79 ML/MIN/1.73
EPI CELLS #/AREA URNS HPF: NORMAL /HPF (ref 0–10)
GLOBULIN SER CALC-MCNC: 2.9 G/DL (ref 1.5–4.5)
GLUCOSE SERPL-MCNC: 85 MG/DL (ref 65–99)
GLUCOSE UR QL STRIP: NEGATIVE
HGB UR QL STRIP: NEGATIVE
KETONES UR QL STRIP: NEGATIVE
LEUKOCYTE ESTERASE UR QL STRIP: ABNORMAL
MICRO URNS: ABNORMAL
NITRITE UR QL STRIP: NEGATIVE
PH UR STRIP: 5 [PH] (ref 5–7.5)
POTASSIUM SERPL-SCNC: 4.8 MMOL/L (ref 3.5–5.2)
PROT SERPL-MCNC: 7.1 G/DL (ref 6–8.5)
PROT UR QL STRIP: NEGATIVE
RBC #/AREA URNS HPF: NORMAL /HPF (ref 0–2)
SODIUM SERPL-SCNC: 143 MMOL/L (ref 134–144)
SP GR UR STRIP: 1.02 (ref 1–1.03)
URINALYSIS REFLEX: ABNORMAL
UROBILINOGEN UR STRIP-MCNC: 1 MG/DL (ref 0.2–1)
WBC #/AREA URNS HPF: NORMAL /HPF (ref 0–5)

## 2022-03-18 NOTE — DISCHARGE INSTRUCTIONS
[facility] [place] [facility] - Post-procedure Instructions          --  While there are no absolute restrictions, it is recommended that you do not perform strenuous activity today. In the morning, you may resume your level of activity as before your block.    --  If you have a band-aid at your injection site, please remove it later today. Observe the area for any redness, swelling, pus-like drainage, or a temperature over 101°. If any of these symptoms occur, please call your doctor at [phone]. If after office hours, leave a message and the on-call provider will return your call.    --  Ice may be applied to your injection site. It is recommended you avoid direct heat (heating pad; hot tub) for 1-2 days.    --  Call [facility] [place]-Pain Management at [phone] if you experience persistent headache, persistent bleeding from the injection site, or severe pain not relieved by heat or oral medication.    --  Do not make important decisions today.    --  Due to the effects of the block and/or the I.V. Sedation, DO NOT drive or operate hazardous machinery for 12 hours.  Local anesthetics may cause numbness after procedure and precautions must be taken with regards to operating equipment as well as with walking, even if ambulating with assistance of another person or with an assistive device.    --  Do not drink alcohol for 12 hours.    -- You may return to work tomorrow, or as directed by your referring doctor.    --  Occasionally you may notice a slight increase in your pain after the procedure. This should start to improve within the next 24-48 hours. Radiofrequency ablation procedure pain may last 3-4 weeks.    --  It may take as long as 3-4 days before you notice a gradual improvement in your pain and/or other symptoms.    -- You may continue to take your prescribed pain medication as needed.    --  Some normal possible side effects of steroid use could include fluid retention, increased blood sugar, dull headache,  increased sweating, increased appetite, mood swings and flushing.    --  Diabetics are recommended to watch their blood glucose level closely for 24-48 hours after the injection.    --  Must stay in PACU for 20 min upon arrival and prove no leg weakness before being discharged.    --  IN THE EVENT OF A LIFE THREATENING EMERGENCY, (CHEST PAIN, BREATHING DIFFICULTIES, PARALYSIS…) YOU SHOULD GO TO YOUR NEAREST EMERGENCY ROOM.    --  You should be contacted by our office within 2-3 days to schedule follow up or next appointment date.  If not contacted within 7 days, please call the office at [phone]    If you have even 1 hour of relief, these injections are considered successful.

## 2022-03-21 ENCOUNTER — HOSPITAL ENCOUNTER (OUTPATIENT)
Dept: GENERAL RADIOLOGY | Facility: SURGERY CENTER | Age: 47
Setting detail: HOSPITAL OUTPATIENT SURGERY
End: 2022-03-21

## 2022-03-21 ENCOUNTER — HOSPITAL ENCOUNTER (OUTPATIENT)
Facility: SURGERY CENTER | Age: 47
Setting detail: HOSPITAL OUTPATIENT SURGERY
Discharge: HOME | End: 2022-03-21
Attending: ANESTHESIOLOGY | Admitting: ANESTHESIOLOGY

## 2022-03-21 VITALS
HEIGHT: 66 IN | WEIGHT: 265 LBS | DIASTOLIC BLOOD PRESSURE: 76 MMHG | OXYGEN SATURATION: 97 % | HEART RATE: 74 BPM | SYSTOLIC BLOOD PRESSURE: 122 MMHG | TEMPERATURE: 97.8 F | RESPIRATION RATE: 18 BRPM | BODY MASS INDEX: 42.59 KG/M2

## 2022-03-21 DIAGNOSIS — Z41.9 SURGERY, ELECTIVE: ICD-10-CM

## 2022-03-21 PROCEDURE — 64454 NJX AA&/STRD GNCLR NRV BRNCH: CPT | Performed by: ANESTHESIOLOGY

## 2022-03-21 PROCEDURE — 77002 NEEDLE LOCALIZATION BY XRAY: CPT

## 2022-03-21 PROCEDURE — 76000 FLUOROSCOPY <1 HR PHYS/QHP: CPT

## 2022-03-21 PROCEDURE — 25010000002 MIDAZOLAM PER 1 MG: Performed by: ANESTHESIOLOGY

## 2022-03-21 RX ORDER — SODIUM CHLORIDE 0.9 % (FLUSH) 0.9 %
10 SYRINGE (ML) INJECTION AS NEEDED
Status: DISCONTINUED | OUTPATIENT
Start: 2022-03-21 | End: 2022-03-21 | Stop reason: HOSPADM

## 2022-03-21 RX ORDER — SODIUM CHLORIDE 0.9 % (FLUSH) 0.9 %
10 SYRINGE (ML) INJECTION EVERY 12 HOURS SCHEDULED
Status: DISCONTINUED | OUTPATIENT
Start: 2022-03-21 | End: 2022-03-21 | Stop reason: HOSPADM

## 2022-03-21 RX ORDER — MIDAZOLAM HYDROCHLORIDE 1 MG/ML
INJECTION INTRAMUSCULAR; INTRAVENOUS AS NEEDED
Status: DISCONTINUED | OUTPATIENT
Start: 2022-03-21 | End: 2022-03-21 | Stop reason: HOSPADM

## 2022-03-21 NOTE — OP NOTE
Genicular Nerve Blockades of the bilateral knee  [facility]    PREOPERATIVE DIAGNOSIS  Degenrative Arthritis of the knee, Chronic knee pain  POSTOPERATIVE DIAGNOSIS Same as preop diagnoses    Procedure:  [id number]  1. Diagnostic Blockade of the Superior Medial Genicular Nerves of Knee  2. Diagnostic Blockade of the Superior Lateral Genicular Nerves of Knee  3.  Diagnostic blockade of the Inferior Medial Genicular Nerves of Knee   4. All under fluoroscopic guidance       PREPROCEDURE DISCUSSION:  Risks and complications were discussed with the patient prior to starting the procedure, including but not limited bleeding, infection, injury, risk of nerve injury and paralysis, risks of coma and seizure and death, risk of a lack of pain relief, and risks of postprocedural soreness or painful flare.  Informed consent was obtained.      SURGEON:  Emily Smiht MD    REASON FOR PROCEDURE:  Diagnostic injection is needed, Chronic knee pain, Failed conservative measures and Failed intra articular steroid injections    ANESTHESIA TYPE:  Local Anesthetics & IV sedation administered by RN  SEDATION:   Versed 2mg IV  TIME OF PROCEDURE: Not Applicable    ANESTHETIC SOLUTION:  1% lidocaine for skin infiltration, and Marcaine 0.5%  for injection    STEROID:  None    DESCRIPTION OF PROCEDURE:  After obtaining informed consent, IV access was obtained in the preoperative area and the patient was transported to the operative suite.  The patient was placed in a supine position with proper support.  All pressure points were well padded.  Pillow was placed under the knee for support.  The patient was monitored throughout the procedure with routine monitoring.  The anterior, medial, and lateral aspects of the knee were prepped with chlorhexidine solution in a wide manner and draped in sterile routine fashion.    AP  fluoroscopy was used to visualize the junction of the bilateral superomedial epicondyle of the knee  and the femur as well as the junction of the superolateral epicondyle and the femur.  The skin and subcutaneous tissue overlying the areas was anesthetized with 1% lidocaine.  A 22-gauge spinal needle was then advanced percutaneously through each anesthetized skin tract until the needle tips lie at approximately the midpoint of the femur in the lateral view.   After negative aspiration of each needle, a volume of 1mL of local anesthetic solution was injected without resistance at each site.  AP  fluoroscopy was then used to visualize the junction of the inferomedial aspect of the tibial tuberosity and the tibia.  The skin and subcutaneous tissue overlying the area was anesthetized with 1% lidocaine.  A 22-gauge spinal needle was then advanced percutaneously through the anesthetized skin tract until the needle tip lie at approximately the midpoint of the tibia in the lateral view.  After negative aspiration, a volume of 1mL of local anesthetic solution was injected without resistance.   The needles were slowly removed intact.    Vital signs were stable throughout.  Onset of analgesia was noted.      ESTIMATED BLOOD LOSS:  minimal  SPECIMENS:  None    COMPLICATIONS:  No complications were noted.    TOLERANCE AND DISCHARGE CONDITION:  The patient tolerated the procedure well and was transported to the recovery area without difficulties.  The patient was discharged home under the care of family in stable and satisfactory condition.      PLAN OF CARE:    1. The patient was given our standard instruction sheet and will resume medications as per the medication reconciliation sheet.  2. The patient is asked to keep an account of their pain for the next several hours after the procedure.  3. We discussed the diagnostic utility and nature of Genicular Nerve Blockades of the Knee and that diagnostic positive results may indicate that the appropriateness of Radiofrequency thermal lesioning of the same branches.  4. The  patient will Return to clinic 1-2 wks

## 2022-03-23 ENCOUNTER — HOSPITAL ENCOUNTER (OUTPATIENT)
Dept: CARDIOLOGY | Facility: HOSPITAL | Age: 47
Discharge: HOME | End: 2022-03-23
Admitting: INTERNAL MEDICINE

## 2022-03-23 ENCOUNTER — TELEPHONE (OUTPATIENT)
Dept: CARDIOLOGY | Facility: CLINIC | Age: 47
End: 2022-03-23

## 2022-03-23 VITALS — HEIGHT: 66 IN | WEIGHT: 264.55 LBS | BODY MASS INDEX: 42.52 KG/M2

## 2022-03-23 LAB
BH CV NUCLEAR PRIOR STUDY: 3
BH CV STRESS BP STAGE 1: NORMAL
BH CV STRESS COMMENTS STAGE 1: NORMAL
BH CV STRESS DOSE REGADENOSON STAGE 1: 0.4
BH CV STRESS DURATION MIN STAGE 1: 0
BH CV STRESS DURATION SEC STAGE 1: 10
BH CV STRESS HR STAGE 1: 141
BH CV STRESS NUCLEAR ISOTOPE DOSE: 41.3 MCI
BH CV STRESS PROTOCOL 1: NORMAL
BH CV STRESS RECOVERY BP: NORMAL MMHG
BH CV STRESS RECOVERY HR: 86 BPM
BH CV STRESS STAGE 1: 1
LV EF NUC BP: 66 %
MAXIMAL PREDICTED HEART RATE: 175 BPM
PERCENT MAX PREDICTED HR: 80.57 %
STRESS BASELINE BP: NORMAL MMHG
STRESS BASELINE HR: 69 BPM
STRESS PERCENT HR: 95 %
STRESS POST EXERCISE DUR SEC: 10 SEC
STRESS POST PEAK BP: NORMAL MMHG
STRESS POST PEAK HR: 141 BPM
STRESS TARGET HR: 149 BPM

## 2022-03-23 PROCEDURE — 78451 HT MUSCLE IMAGE SPECT SING: CPT

## 2022-03-23 PROCEDURE — 25010000002 REGADENOSON 0.4 MG/5ML SOLUTION: Performed by: INTERNAL MEDICINE

## 2022-03-23 PROCEDURE — 93018 CV STRESS TEST I&R ONLY: CPT | Performed by: INTERNAL MEDICINE

## 2022-03-23 PROCEDURE — 93017 CV STRESS TEST TRACING ONLY: CPT

## 2022-03-23 PROCEDURE — 93016 CV STRESS TEST SUPVJ ONLY: CPT | Performed by: INTERNAL MEDICINE

## 2022-03-23 PROCEDURE — 78451 HT MUSCLE IMAGE SPECT SING: CPT | Performed by: INTERNAL MEDICINE

## 2022-03-23 PROCEDURE — 78452 HT MUSCLE IMAGE SPECT MULT: CPT

## 2022-03-23 RX ADMIN — REGADENOSON 0.4 MG: 0.08 INJECTION, SOLUTION INTRAVENOUS at 11:54

## 2022-03-23 NOTE — TELEPHONE ENCOUNTER
Called Aurora Bull to review results however there was no answer and no voicemail set up.  Will attempt to contact patient again tomorrow.    Thank you,  Criss Hollis RN  Triage Nurse [facility] [place]

## 2022-03-23 NOTE — TELEPHONE ENCOUNTER
Dr. Gold not in the office today.    TRIAGE RN--- please let patient know that stress test was without evidence of ischemia, considered low risk for tightly blocked arteries in the heart.  Would have her ease into a light walking routine and call if symptoms worsen or fail to improve at which point additional testing may be indicated.  Would also have her discuss possible noncardiac causes of symptoms with primary care.      DR. GOLD--- please let me know if you have anything more to add at this time.  Were you wanting to see patient back in the office for follow-up?

## 2022-03-24 NOTE — TELEPHONE ENCOUNTER
Reviewed results and recommendations with Aurora Bull.  Patient verbalized understanding.    Thank you,  Criss Hollis RN  Triage Nurse [facility]

## 2022-03-29 ENCOUNTER — OFFICE VISIT (OUTPATIENT)
Dept: PAIN MEDICINE | Facility: CLINIC | Age: 47
End: 2022-03-29

## 2022-03-29 VITALS
RESPIRATION RATE: 16 BRPM | BODY MASS INDEX: 41.66 KG/M2 | SYSTOLIC BLOOD PRESSURE: 136 MMHG | HEART RATE: 91 BPM | WEIGHT: 259.2 LBS | OXYGEN SATURATION: 96 % | HEIGHT: 66 IN | TEMPERATURE: 96.9 F | DIASTOLIC BLOOD PRESSURE: 91 MMHG

## 2022-03-29 DIAGNOSIS — M25.562 CHRONIC PAIN OF BOTH KNEES: Primary | ICD-10-CM

## 2022-03-29 DIAGNOSIS — M17.0 OSTEOARTHRITIS OF BOTH KNEES, UNSPECIFIED OSTEOARTHRITIS TYPE: ICD-10-CM

## 2022-03-29 DIAGNOSIS — G89.4 CHRONIC PAIN SYNDROME: ICD-10-CM

## 2022-03-29 DIAGNOSIS — M25.561 CHRONIC PAIN OF BOTH KNEES: Primary | ICD-10-CM

## 2022-03-29 DIAGNOSIS — G89.29 CHRONIC PAIN OF BOTH KNEES: Primary | ICD-10-CM

## 2022-03-29 PROCEDURE — 99213 OFFICE O/P EST LOW 20 MIN: CPT | Performed by: NURSE PRACTITIONER

## 2022-03-29 NOTE — PROGRESS NOTES
CHIEF COMPLAINT  PROCEDURE FOLLOW UP KNEE GENICULAR NERVE BLOCK UNDER FLUORO bilateral.    Subjective   Aurora Bull is a 46 y.o. female  who presents to the office for follow-up of procedure.  She completed a Bilateral Genicular Nerve Blocks   on  3/21/2022 performed by Dr. Smith for management of bilateral knee pain. Patient reports 80% relief from the procedure when she was lying down, she saw no relief when weight bearing activities in her knees.     Today her pain is 8/10VAS in severity. Discussed that with No improvement with weight bearing even immediately after her procedure that her genicular nerve blocks are diagnostically negative. She does continue with steroid injections (provided by orthopedics) which are somewhat helpful. In the past she trialed Tramadol, but stopped this medication as it was not helpful.     History of Gastric sleeve with 100 lb weight loss. Avoid NSAIDs.     Patient does state that her brother  from a heroine overdose.     Patient remained masked during entire encounter. No cough present. I donned a mask and eye protection throughout entire visit. Prior to donning mask and eye protection, hand hygiene was performed, as well as when it was doffed.  I was closer than 6 feet, but not for an extended period of time. No obvious exposure to any bodily fluids.    Knee Pain   The injury mechanism was a fall (off of a horse at age 20). The pain is present in the left knee and right knee. The pain is at a severity of 8/10. Pertinent negatives include no numbness. The symptoms are aggravated by weight bearing and movement. She has tried ice, elevation and non-weight bearing for the symptoms.      PEG Assessment   What number best describes your pain on average in the past week?8  What number best describes how, during the past week, pain has interfered with your enjoyment of life?8  What number best describes how, during the past week, pain has interfered with your general activity?   8    Review of Pertinent Medical Data ---  Office visit from 3/10/2020 with Dr. Smith reviewed.  Patient has a history of chronic bilateral knee pain and Osgood-Schlatters disease.  She has had knee pain her whole life which worsened as a teen after falling off a horse.  She has had surgery on her right knee previously.  She has been to physical therapy and utilize a TENS unit.  She is also utilize Voltaren gel, CBD ointments, and THC prescribed while living in [place].  She does have a history of gastric sleeve.  Knee steroid injections are somewhat helpful.  Bilateral knee x-ray shows advanced arthritic change with large osteophytes in both knees.  Schedule bilateral genicular nerve blocks if good result will move towards RFA.    The following portions of the patient's history were reviewed and updated as appropriate: allergies, current medications, past family history, past medical history, past social history, past surgical history and problem list.    Review of Systems   Constitutional: Negative for activity change, fatigue and fever.   HENT: Negative for congestion.    Eyes: Negative for visual disturbance.   Respiratory: Negative for cough and chest tightness.    Cardiovascular: Negative for chest pain.   Gastrointestinal: Positive for diarrhea. Negative for abdominal pain and constipation.   Genitourinary: Negative for difficulty urinating and dysuria.   Neurological: Positive for weakness (bilateral knee). Negative for dizziness, light-headedness, numbness and headaches.   Psychiatric/Behavioral: Negative for agitation, sleep disturbance and suicidal ideas. The patient is not nervous/anxious.      --  The aforementioned information the Chief Complaint section and above subjective data including any HPI data, and also the Review of Systems data, has been personally reviewed and affirmed.  --     Vitals:    03/29/22 1427   BP: 136/91   BP Location: Right arm   Patient Position: Sitting   Pulse: 91   Resp: 16  "  Temp: 96.9 °F (36.1 °C)   SpO2: 96%   Weight: 118 kg (259 lb 3.2 oz)   Height: 167.6 cm (65.98\")   PainSc:   8   PainLoc: Knee     Objective   Physical Exam  Vitals and nursing note reviewed.   Constitutional:       Appearance: She is well-developed.   Eyes:      General: Lids are normal.   Cardiovascular:      Rate and Rhythm: Normal rate.   Pulmonary:      Effort: Pulmonary effort is normal.   Musculoskeletal:      Right knee: Decreased range of motion. Tenderness present.      Left knee: Decreased range of motion. Tenderness present.   Neurological:      Mental Status: She is alert and oriented to person, place, and time.   Psychiatric:         Speech: Speech normal.         Behavior: Behavior normal.         Judgment: Judgment normal.       Assessment/Plan   Diagnoses and all orders for this visit:    1. Chronic pain of both knees (Primary)  -     Ambulatory Referral to Psychology    2. Osteoarthritis of both knees, unspecified osteoarthritis type  -     Ambulatory Referral to Physical Therapy Evaluate and treat, Aquatics  -     Ambulatory Referral to Psychology    3. Chronic pain syndrome      --- Discussed that unfortunately from an interventional perspective we do not have other procedures for her bilateral knee pain.   --- Could consider very sparing use of opioid. Recommend opioid risk assessment first.   --- She states she has previously done well with aquatic therapy, will order this today.   --- Follow-up after completion of opioid risk assessment.      ADAM REPORT    As the clinician, I personally reviewed the ADAM from 3/29/2022 while the patient was in the office today.    Dictated utilizing Dragon dictation.      This document is intended for medical expert use only. Reading of this document by patients and/or patient's family without participating medical staff guidance may result in misinterpretation and unintended morbidity.   Any interpretation of such data is the responsibility of the " patient and/or family member responsible for the patient in concert with their primary or specialist providers, not to be left for sources of online searches such as [employer], Google or similar queries. Relying on these approaches to knowledge may result in misinterpretation, misguided goals of care and even death should patients or family members try recommendations outside of the realm of professional medical care in a supervised way.

## 2022-04-01 ENCOUNTER — OFFICE VISIT (OUTPATIENT)
Dept: FAMILY MEDICINE CLINIC | Facility: CLINIC | Age: 47
End: 2022-04-01

## 2022-04-01 VITALS
HEIGHT: 66 IN | WEIGHT: 258 LBS | DIASTOLIC BLOOD PRESSURE: 88 MMHG | BODY MASS INDEX: 41.46 KG/M2 | SYSTOLIC BLOOD PRESSURE: 130 MMHG | OXYGEN SATURATION: 97 % | TEMPERATURE: 96.9 F | HEART RATE: 75 BPM

## 2022-04-01 DIAGNOSIS — R07.89 TENDERNESS OF CHEST WALL: ICD-10-CM

## 2022-04-01 DIAGNOSIS — K21.9 GASTROESOPHAGEAL REFLUX DISEASE, UNSPECIFIED WHETHER ESOPHAGITIS PRESENT: Primary | ICD-10-CM

## 2022-04-01 DIAGNOSIS — J18.9 LUNG INFECTION: ICD-10-CM

## 2022-04-01 PROCEDURE — 99213 OFFICE O/P EST LOW 20 MIN: CPT | Performed by: NURSE PRACTITIONER

## 2022-04-01 PROCEDURE — 71046 X-RAY EXAM CHEST 2 VIEWS: CPT | Performed by: NURSE PRACTITIONER

## 2022-04-01 RX ORDER — AMOXICILLIN 875 MG/1
875 TABLET, COATED ORAL 2 TIMES DAILY
Qty: 14 TABLET | Refills: 0 | Status: SHIPPED | OUTPATIENT
Start: 2022-04-01 | End: 2022-04-08

## 2022-04-01 RX ORDER — OMEPRAZOLE 20 MG/1
20 CAPSULE, DELAYED RELEASE ORAL DAILY
Qty: 30 CAPSULE | Refills: 0 | Status: SHIPPED | OUTPATIENT
Start: 2022-04-01 | End: 2022-04-29

## 2022-04-07 ENCOUNTER — TELEPHONE (OUTPATIENT)
Dept: FAMILY MEDICINE CLINIC | Facility: CLINIC | Age: 47
End: 2022-04-07

## 2022-04-29 ENCOUNTER — OFFICE VISIT (OUTPATIENT)
Dept: FAMILY MEDICINE CLINIC | Facility: CLINIC | Age: 47
End: 2022-04-29

## 2022-04-29 DIAGNOSIS — K21.9 GASTROESOPHAGEAL REFLUX DISEASE, UNSPECIFIED WHETHER ESOPHAGITIS PRESENT: ICD-10-CM

## 2022-04-29 DIAGNOSIS — R07.9 CHEST PAIN, UNSPECIFIED TYPE: ICD-10-CM

## 2022-04-29 DIAGNOSIS — K52.9 CHRONIC DIARRHEA: Primary | ICD-10-CM

## 2022-04-29 PROCEDURE — 99214 OFFICE O/P EST MOD 30 MIN: CPT | Performed by: NURSE PRACTITIONER

## 2022-04-29 RX ORDER — PANTOPRAZOLE SODIUM 20 MG/1
20 TABLET, DELAYED RELEASE ORAL DAILY
Qty: 90 TABLET | Refills: 0 | Status: SHIPPED | OUTPATIENT
Start: 2022-04-29 | End: 2022-06-10

## 2022-05-02 VITALS
TEMPERATURE: 98.1 F | HEIGHT: 66 IN | HEART RATE: 107 BPM | OXYGEN SATURATION: 97 % | DIASTOLIC BLOOD PRESSURE: 82 MMHG | SYSTOLIC BLOOD PRESSURE: 124 MMHG | WEIGHT: 253 LBS | BODY MASS INDEX: 40.66 KG/M2

## 2022-05-02 PROCEDURE — 93000 ELECTROCARDIOGRAM COMPLETE: CPT | Performed by: NURSE PRACTITIONER

## 2022-05-03 NOTE — PROGRESS NOTES
"Chief Complaint   Patient presents with   • Diarrhea   • Chest Pain         History of Present Illness  Patient is a 46-year-old female who presents today for follow-up.  She has a history of IBS-D and history of previous elevated fecal calprotectin.  At her last office visit, stool studies were obtained which were negative for infection.  Fecal calprotectin was checked and was normal.  Prior treatment included dicyclomine at last office visit she was given a prescription for colestipol.    Patient presents today for follow-up with concerns about persistent diarrhea and chest pain.    She reports she continues to have diarrhea on a daily basis with 5 or more bowel movements per day.  Reports fecal urgency and difficulty eating outside the house due to unpredictability of symptoms.  She denies any blood in the stool or associated abdominal pain.    She reports over the last month she has been experiencing chest pain.  Pain is present to the center of her chest.  She was evaluated for this and reports cardiac work-up was negative.  She has had nausea and vomiting associated with this but denies any symptoms of heartburn or reflux.     Result Review :       Calprotectin, Fecal - Stool, Per Rectum (12/20/2021 11:15)   Clostridium Difficile Toxin, PCR - Stool, Per Rectum (12/20/2021 11:15)   Office Visit with Johan, Marilyn G, APRN (12/17/2021)   C-reactive Protein (11/19/2021 14:15)   Celiac Disease Panel (11/19/2021 14:15)   Office Visit with Johan, Marilyn G, APRN (11/19/2021)   Ferritin (11/01/2021 16:00)   Iron Profile (11/01/2021 16:00)   Comprehensive metabolic panel (11/01/2021 16:00)   CBC w AUTO Differential (11/01/2021 16:00)       Vital Signs:   /82   Pulse 87   Temp 97.5 °F (36.4 °C)   Ht 167.6 cm (66\")   Wt 117 kg (258 lb 3.2 oz)   SpO2 96%   BMI 41.67 kg/m²     Body mass index is 41.67 kg/m².     Physical Exam  Vitals reviewed.   Constitutional:       General: She is not in acute distress.     " Appearance: She is well-developed.   HENT:      Head: Normocephalic and atraumatic.   Pulmonary:      Effort: Pulmonary effort is normal. No respiratory distress.   Abdominal:      General: Abdomen is flat. Bowel sounds are normal. There is no distension.      Palpations: Abdomen is soft.      Tenderness: There is no abdominal tenderness.   Skin:     General: Skin is dry.      Coloration: Skin is not pale.   Neurological:      Mental Status: She is alert and oriented to person, place, and time.   Psychiatric:         Thought Content: Thought content normal.           Assessment and Plan    Diagnoses and all orders for this visit:    1. Chest pain, unspecified type (Primary)  -     diphenoxylate-atropine (LOMOTIL) 2.5-0.025 MG per tablet; Take 1 tablet by mouth 4 (Four) Times a Day As Needed for Diarrhea.  Dispense: 120 tablet; Refill: 2    2. Nausea and vomiting, unspecified vomiting type  -     diphenoxylate-atropine (LOMOTIL) 2.5-0.025 MG per tablet; Take 1 tablet by mouth 4 (Four) Times a Day As Needed for Diarrhea.  Dispense: 120 tablet; Refill: 2    3. Diarrhea, unspecified type  -     diphenoxylate-atropine (LOMOTIL) 2.5-0.025 MG per tablet; Take 1 tablet by mouth 4 (Four) Times a Day As Needed for Diarrhea.  Dispense: 120 tablet; Refill: 2         Discussion  Patient presents today for follow-up with concerns about persistent diarrhea and new complaint of chest pain with nausea and vomiting.  Recommended EGD for further evaluation, assess for any evidence of gastritis, peptic ulcer disease, celiac disease, or H. pylori that could be contributing to symptoms.  Recommended colonoscopy to evaluate for any evidence of colitis.  While work-up is being completed, will provide prescription for Lomotil to be used for diarrhea.          Follow Up   Return for Follow up to review results after testing complete.    Patient Instructions   Schedule EGD and colonoscopy for further evaluation.     For diarrhea, begin trial  of Lomotil as prescribed.  Prescription sent to pharmacy.

## 2022-05-04 ENCOUNTER — PREP FOR SURGERY (OUTPATIENT)
Dept: SURGERY | Facility: SURGERY CENTER | Age: 47
End: 2022-05-04

## 2022-05-04 ENCOUNTER — OFFICE VISIT (OUTPATIENT)
Dept: GASTROENTEROLOGY | Facility: CLINIC | Age: 47
End: 2022-05-04

## 2022-05-04 VITALS
WEIGHT: 258.2 LBS | HEIGHT: 66 IN | HEART RATE: 87 BPM | BODY MASS INDEX: 41.5 KG/M2 | DIASTOLIC BLOOD PRESSURE: 82 MMHG | OXYGEN SATURATION: 96 % | TEMPERATURE: 97.5 F | SYSTOLIC BLOOD PRESSURE: 150 MMHG

## 2022-05-04 DIAGNOSIS — R19.7 DIARRHEA, UNSPECIFIED TYPE: ICD-10-CM

## 2022-05-04 DIAGNOSIS — R11.2 NAUSEA AND VOMITING, UNSPECIFIED VOMITING TYPE: ICD-10-CM

## 2022-05-04 DIAGNOSIS — R07.9 CHEST PAIN, UNSPECIFIED TYPE: Primary | ICD-10-CM

## 2022-05-04 PROCEDURE — 99214 OFFICE O/P EST MOD 30 MIN: CPT | Performed by: NURSE PRACTITIONER

## 2022-05-04 RX ORDER — SODIUM CHLORIDE 0.9 % (FLUSH) 0.9 %
10 SYRINGE (ML) INJECTION AS NEEDED
Status: CANCELLED | OUTPATIENT
Start: 2022-05-04

## 2022-05-04 RX ORDER — SODIUM CHLORIDE, SODIUM LACTATE, POTASSIUM CHLORIDE, CALCIUM CHLORIDE 600; 310; 30; 20 MG/100ML; MG/100ML; MG/100ML; MG/100ML
30 INJECTION, SOLUTION INTRAVENOUS CONTINUOUS PRN
Status: CANCELLED | OUTPATIENT
Start: 2022-05-04

## 2022-05-04 RX ORDER — DIPHENOXYLATE HYDROCHLORIDE AND ATROPINE SULFATE 2.5; .025 MG/1; MG/1
1 TABLET ORAL 4 TIMES DAILY PRN
Qty: 120 TABLET | Refills: 2 | Status: SHIPPED | OUTPATIENT
Start: 2022-05-04

## 2022-05-04 RX ORDER — SODIUM CHLORIDE 0.9 % (FLUSH) 0.9 %
3 SYRINGE (ML) INJECTION EVERY 12 HOURS SCHEDULED
Status: CANCELLED | OUTPATIENT
Start: 2022-05-04

## 2022-05-04 NOTE — PATIENT INSTRUCTIONS
Schedule EGD and colonoscopy for further evaluation.     For diarrhea, begin trial of Lomotil as prescribed.  Prescription sent to pharmacy.

## 2022-05-05 PROBLEM — R07.9 CHEST PAIN: Status: ACTIVE | Noted: 2022-05-05

## 2022-05-05 PROBLEM — R11.2 NAUSEA AND VOMITING: Status: ACTIVE | Noted: 2022-05-05

## 2022-05-05 PROBLEM — R19.7 DIARRHEA: Status: ACTIVE | Noted: 2022-05-05

## 2022-05-06 ENCOUNTER — TELEPHONE (OUTPATIENT)
Dept: PHYSICAL THERAPY | Facility: CLINIC | Age: 47
End: 2022-05-06

## 2022-05-06 ENCOUNTER — TREATMENT (OUTPATIENT)
Dept: PHYSICAL THERAPY | Facility: CLINIC | Age: 47
End: 2022-05-06

## 2022-05-06 DIAGNOSIS — M25.561 CHRONIC PAIN OF BOTH KNEES: ICD-10-CM

## 2022-05-06 DIAGNOSIS — R26.2 DIFFICULTY WALKING: ICD-10-CM

## 2022-05-06 DIAGNOSIS — G89.29 CHRONIC PAIN OF BOTH KNEES: ICD-10-CM

## 2022-05-06 DIAGNOSIS — M25.562 CHRONIC PAIN OF BOTH KNEES: ICD-10-CM

## 2022-05-06 DIAGNOSIS — M17.10 ARTHRITIS OF KNEE: Primary | ICD-10-CM

## 2022-05-06 PROCEDURE — 97162 PT EVAL MOD COMPLEX 30 MIN: CPT | Performed by: PHYSICAL THERAPIST

## 2022-05-06 PROCEDURE — 97110 THERAPEUTIC EXERCISES: CPT | Performed by: PHYSICAL THERAPIST

## 2022-05-06 NOTE — PROGRESS NOTES
"  Physical Therapy Initial Evaluation and Plan of Care      Patient: Aurora Bull   : 1975  Diagnosis/ICD-10 Code:  Arthritis of knee [M17.10]  Referring practitioner: MJ Patel  Date of Initial Visit: 2022  Today's Date: 2022  Patient seen for 1 sessions           Subjective Evaluation    History of Present Illness  Mechanism of injury: B Knee Pain due to OA. The pain is chronic and dates back to when she was 21 y/o and fell off a horse (~24 years ago). Patient reports her Orthopedic told her she has the worst knees of anyone her age and she is bone on bone. They want to avoid TKA surgery due to her age and weight. She was told if she loses some weight she would be a surgical candidate in her 50's. She has tried to manage the pain with land therapy and injections. She also has PMH of 2 R knee arthroscopic surgeries.    Patient reports her pain worsens with bending, prolonged standing/walking, stairs, and squats. Symptoms improve with rest. She is unable to tolerate kneeling. Walking tolerance limited to ~10 min. She describes the pain as achy and \"tight.\"    PMH: Mental health issues    PLOF: Sedentary. Lives with parents, , and 3 children (adults)    Quality of life: good    Pain  Current pain ratin  At best pain rating: 3  At worst pain ratin  Location: B knees  Quality: dull ache and tight  Relieving factors: rest and relaxation  Aggravating factors: stairs, squatting, ambulation, standing, prolonged positioning and repetitive movement  Progression: worsening    Social Support  Lives in: multiple-level home  Lives with: spouse, parents and adult children    Diagnostic Tests  X-ray: abnormal (bone on bone OA)             Objective          Neurological Testing     Sensation     Lumbar   Left   Intact: light touch    Right   Intact: light touch    Active Range of Motion   Left Knee   Flexion: 85 degrees with pain  Extensor la degrees with pain    Right Knee "   Flexion: 94 degrees   Extensor lag: 10 degrees     Strength/Myotome Testing     Left Hip   Planes of Motion   Flexion: 3+    Right Hip   Planes of Motion   Flexion: 4    Left Knee   Flexion: 4-  Extension: 4-    Right Knee   Flexion: 4  Extension: 4    Left Ankle/Foot   Dorsiflexion: 4-    Right Ankle/Foot   Dorsiflexion: 4-    Tests     Left Knee   Negative anterior drawer, lateral Ezra, medial Ezra, posterior drawer, valgus stress test at 0 degrees and varus stress test at 0 degrees.     Right Knee   Negative anterior drawer, lateral Ezra, medial Ezra, posterior drawer, valgus stress test at 0 degrees and varus stress test at 0 degrees.     Additional Tests Details  *Testing difficult to assess due to increased knee pain during testing    Ambulation     Comments   Widened BRONSON with decreased foot clearance, increased B foot supination, slightly circumduction to advance each leg        See Exercise, Manual, and Modality Logs for complete treatment.       Functional Outcome Score: KOS: 31/80    EXERCISES:  -LAQ 20x  -Squats 20x  -Bridges 20x  -Quad sets 20x  -Heel slides with belt [id number]    Assessment & Plan     Assessment  Impairments: abnormal gait, abnormal muscle firing, abnormal or restricted ROM, activity intolerance, impaired balance, impaired physical strength and pain with function  Functional Limitations: sleeping, walking, uncomfortable because of pain, sitting and standing  Assessment details: Aurora Bull is a 46 y.o. year-old female referred to physical therapy for B knee pain due to bone on bone OA (dates back >20 years and has gradually worsened with time). She presents with a evolving clinical presentation.  She has comorbidities of mental health issues and personal factors of relying on others for transportation which may affect her progress in the plan of care.  Signs and symptoms are consistent with physical therapy diagnosis of B knee pain and difficulty walking. Patient is  appropriate for skilled physical therapy in order to reduce pain and increase ease with daily mobility.     Barriers to therapy: none identified  Prognosis: good    Goals  Plan Goals: STGs to be completed within 30 days:  -Patient will demonstrate compliance and independence with initial HEP  -Patient will increase B Knee AROM to 9-95 degrees to help normalize gait mechanics and increase ease with transfers  -Patient will perform sit to stand transfer with equilateral WB and no UE assistance    LTGs to be completed within 90 days:  -Patient will increase B Knee AROM to 5-100 degrees to help normalize gait mechanics and increase ease with transfers  -Patient will demonstrate safety and independence with advanced Aquatic HEP to facilitate self management of condition  -Patient will improve score on KOS from 31 at eval to 40 or greater to improve quality of life    Plan  Therapy options: will be seen for skilled therapy services  Planned therapy interventions: joint mobilization, stretching, strengthening, therapeutic activities, transfer training, postural training, manual therapy, ADL retraining, balance/weight-bearing training, flexibility, functional ROM exercises, gait training, home exercise program, neuromuscular re-education and motor coordination training  Other planned therapy interventions: Aquatic Therapy  Frequency: 2x week (36 visits)  Treatment plan discussed with: patient  Plan details: Gait training, stairs, Balance, Knee ROM/strength, LE stability        Timed:  Manual Therapy:         mins  57492;  Therapeutic Exercise:    8     mins  62113;     Neuromuscular [place]:        mins  74574;    Therapeutic Activity:          mins  85059;     Gait Training:           mins  84585;     Ultrasound:          mins  58727;    Iontophoresis         mins 69282  Dry Needling        mins 90554/ 20561 (Self-pay)      Untimed:  Electrical Stimulation:         mins  45650 ( );  Traction:       mins  39250;   Low  Eval          Mins  26784  Mod Eval     22     Mins  64622  High Eval                            Mins  64464    Timed Treatment:   8   mins   Total Treatment:     30   mins    PT SIGNATURE: Aurora Niño PT     License Number: [place] [id number]    Electronically signed by Aurora Niño PT, 05/06/22, 2:39 PM EDT    DATE TREATMENT INITIATED: 5/6/2022    Initial Certification  Certification Period: 8/4/2022  I certify that the therapy services are furnished while this patient is under my care.  The services outlined above are required by this patient, and will be reviewed every 90 days.     PHYSICIAN: Snehal Jesus APRN   NPI: [id number]                                         DATE:     Please sign and return via fax to [phone] Thank you, [facility].

## 2022-06-02 ENCOUNTER — TREATMENT (OUTPATIENT)
Dept: PHYSICAL THERAPY | Facility: CLINIC | Age: 47
End: 2022-06-02

## 2022-06-02 DIAGNOSIS — R26.2 DIFFICULTY WALKING: ICD-10-CM

## 2022-06-02 DIAGNOSIS — G89.29 CHRONIC PAIN OF BOTH KNEES: ICD-10-CM

## 2022-06-02 DIAGNOSIS — M25.562 CHRONIC PAIN OF BOTH KNEES: ICD-10-CM

## 2022-06-02 DIAGNOSIS — M17.10 ARTHRITIS OF KNEE: Primary | ICD-10-CM

## 2022-06-02 DIAGNOSIS — M25.561 CHRONIC PAIN OF BOTH KNEES: ICD-10-CM

## 2022-06-02 PROCEDURE — 97113 AQUATIC THERAPY/EXERCISES: CPT | Performed by: PHYSICAL THERAPIST

## 2022-06-02 NOTE — PROGRESS NOTES
"Physical Therapy Daily Progress Note    Patient: Aurora Bull   : 1975  Diagnosis/ICD-10 Code:  Arthritis of knee [M17.10]  Referring practitioner: MJ Patel  Date of Initial Visit: Type: THERAPY  Noted: 2022  Today's Date: 2022  Patient seen for 2 sessions             Subjective   Aurora reports her knees have been hurting.    Objective     AQUATIC EXERCISES:  Water Walk : forward, side step; 2 laps each              Stretch 1: Hamstring hip sweeps with LARGE noodle x15                  Stretch 2: Quad/hip flexor with LARGE noodle under knee, 30-45 sec             Stretch 3: Calf; 30 sec B  Hip Abd: 15x               Hip Ext: 15x   SLR: 15x  Hip Circles: 10/10  Hamstring curls: 20x               March in Place (Alternating): 30x  Walking Marches: 2 laps  Tandem Walk: 2 laps  Tip Toe Walk 2 laps  Leg Press: Large Blue ring 20x B          Mini Squat: 15x                  Heel Raises: S/L 20x each                     Uni-Clock: UE on LN, 1 min with each leg.                   Step Ups  8\" Height: 20x B F, 20x L    Suspended with LN:                 Bicycle: 3 min                          Seated in hot tub:   Flutter/Scissor 1 min each    LAQ x20                Assessment/Plan  Initial aquatic therapy session completed today. Provided verbal cues and demonstration for all exercises performed. Patient reports her walking tolerance is much better in the water. Some lateral trunk lean noted with tandem walking due to instability. Also more tightness in R knee with flexion based exercises. Will assess response to initial PT session and progress as appropriate.          Timed:  Aquatic Therapy    38     mins 79784;    Aurora Niño, PT  Physical Therapist    [place] License: [id number]  "

## 2022-06-03 ENCOUNTER — TELEPHONE (OUTPATIENT)
Dept: PAIN MEDICINE | Facility: CLINIC | Age: 47
End: 2022-06-03

## 2022-06-03 NOTE — TELEPHONE ENCOUNTER
Patient completed genicular nerve blocks which were diagnostically negative.  Patient was sent for an opioid risk assessment and has been found to not be a good candidate for consideration of opioid therapy.  It is recommended that opioid therapy be avoided.  Please notify this patient that our office has nothing else to offer for her chronic knee pain we will release her back to orthopedics.

## 2022-06-07 ENCOUNTER — TREATMENT (OUTPATIENT)
Dept: PHYSICAL THERAPY | Facility: CLINIC | Age: 47
End: 2022-06-07

## 2022-06-07 DIAGNOSIS — M25.562 CHRONIC PAIN OF BOTH KNEES: ICD-10-CM

## 2022-06-07 DIAGNOSIS — M25.561 CHRONIC PAIN OF BOTH KNEES: ICD-10-CM

## 2022-06-07 DIAGNOSIS — G89.29 CHRONIC PAIN OF BOTH KNEES: ICD-10-CM

## 2022-06-07 DIAGNOSIS — M17.10 ARTHRITIS OF KNEE: Primary | ICD-10-CM

## 2022-06-07 DIAGNOSIS — R26.2 DIFFICULTY WALKING: ICD-10-CM

## 2022-06-07 PROCEDURE — 97113 AQUATIC THERAPY/EXERCISES: CPT | Performed by: PHYSICAL THERAPIST

## 2022-06-07 NOTE — PROGRESS NOTES
"Physical Therapy 30-Day / 10-Visit Progress Note         Patient: Aurora Bull   : 1975  Diagnosis/ICD-10 Code:  Arthritis of knee [M17.10]  Referring practitioner: MJ Patel  Date of Initial Visit: Type: THERAPY  Noted: 2022  Today's Date: 2022  Patient seen for 3 sessions      Subjective:     Clinical Progress: improved  Home Program Compliance: Yes  Treatment has included:  therapeutic exercise and aquatic therapy    Subjective   Aurora reports she was sore after her initial PT session but she can tell her knees respond well to the water.    Objective     Active Range of Motion   Left Knee   Flexion: 105 degrees with pain  Extensor lag: 10 degrees with pain     Right Knee   Flexion: 115 degrees   Extensor lag: 10 degrees      Strength/Myotome Testing      Left Hip   Planes of Motion   Flexion: 4     Right Hip   Planes of Motion   Flexion: 4     Left Knee   Flexion: 4  Extension: 4     Right Knee   Flexion: 4+  Extension: 4+     Left Ankle/Foot   Dorsiflexion: 4-     Right Ankle/Foot   Dorsiflexion: 4        Ambulation   Widened BRONSON with decreased foot clearance, increased B foot supination, slightly circumduction to advance each leg         Functional Outcome Score: KOS: 31/80    AQUATIC EXERCISES:  Water Walk : forward, side step; 2 laps each              Stretch 1: Hamstring hip sweeps with LARGE noodle x15                  Stretch 2: Quad/hip flexor with LARGE noodle under knee, 30-45 sec             Stretch 3: Calf; 30 sec B  Hip Abd: 15x               Hip Ext: 15x   SLR: 15x *defer  Hip Circles: 10/10 *defer  Hamstring curls: 20x               March in Place (Alternating): 30x *defer  Walking Marches: 2 laps  Tandem Walk: 2 laps  Tip Toe Walk 2 laps *defer  Leg Press: Large Blue ring 20x B          Mini Squat: 15x                  Heel Raises: S/L 20x each                     Uni-Clock: UE on LN, 1 min with each leg. *defer                   Step Ups  8\" Height: 20x B Forward, 20x " Lateral    Suspended with LN:                 Bicycle: 3 min                          Seated in hot tub: *independent  Flutter/Scissor 1 min each    LAQ x20              Assessment/Plan  Aurora Bull has been seen for 3 physical therapy sessions for B knee pain due to bone on bone OA (dates back >20 years and has gradually worsened with time). .  Treatment has included therapeutic exercise and aquatic therapy. Progress to physical therapy goals is good. Even after just one treatment session Aurora demonstrates improved B LE strength and knee flexion AROM. No change in self reported disability on KOS. She will benefit from continued skilled physical therapy to address remaining impairments and functional limitations.      Goals  Plan Goals: STGs to be completed within 30 days:  -Patient will demonstrate compliance and independence with initial HEP (MET)  -Patient will increase B Knee AROM to 9-95 degrees to help normalize gait mechanics and increase ease with transfers (PARTIALLY MET for flexion)  -Patient will perform sit to stand transfer with equilateral WB and no UE assistance (ONGOING)    LTGs to be completed within 90 days:  -Patient will increase B Knee AROM to 5-100 degrees to help normalize gait mechanics and increase ease with transfers (ONGOING)  -Patient will demonstrate safety and independence with advanced Aquatic HEP to facilitate self management of condition (ONGOING)  -Patient will improve score on KOS from 31 at eval to 40 or greater to improve quality of life (ONGOING)    Recommendations: Continue as planned  Timeframe: 1 month; 2x/week  Prognosis to achieve goals: good    PT Signature: Aurora Niño, ANKITA  [place] License: [id number]      Based upon review of the patient's progress and continued therapy plan, it is my medical opinion that Aurora Bull should continue physical therapy treatment at [facility]  [address] [place]  [address]  [phone].    Signature: __________________________________  Snehal Jesus APRN  NPI: [id number]                                          Timed:  Aquatic therapy  99682    25   mins

## 2022-06-09 NOTE — PROGRESS NOTES
Chief Complaint  Heartburn (Follow up last office visit and follow up on kidney function)    Masks/face shield/appropriate PPE were worn for the entirety of the visit by the patient, MA, and provider.     Subjective        Aurora Bull presents to [facility]  History of Present Illness  Aurora Bull is a 46 y.o. female who presents to the clinic today to follow-up on symptoms of chest discomfort.  Patient's symptoms originally started 3/11/2022.  Patient had full cardiac work-up that was unremarkable.  Patient was still having symptoms on last office visit, 4/29/2022, and her symptoms thought to be related to acid reflux and she was switched from omeprazole to pantoprazole and advised to follow-up with her gastroenterologist.  Patient was evaluated by gastroenterology since her last visit.  EGD was recommended for further evaluation.  Today, patient is feeling well.  She does feel as if her chest pain is somewhat improved.  She still having diarrhea and continues to follow with gastroenterology.    Patient is still struggling with knee pain.  She will see the orthopedic doctor later this month.  She is following with pain management and is also in aquatic physical therapy.  She is try to avoid NSAIDs because she has a history of abnormal kidney function.  She has tried Tylenol, which is not helped.  She has received steroid injections in her knees.    Review of Systems   Constitutional: Negative.    HENT: Negative.    Eyes: Negative.    Respiratory: Negative.    Cardiovascular: Negative for chest pain.   Gastrointestinal: Positive for diarrhea.   Endocrine: Negative.    Genitourinary: Negative.    Musculoskeletal: Positive for arthralgias.   Skin: Negative.    Neurological: Negative.    Hematological: Negative.    Psychiatric/Behavioral: Negative.        Objective   Vital Signs:  /72 (BP Location: Right arm, Patient Position: Sitting, Cuff Size: Large Adult)   Pulse 100    "Temp 97.5 °F (36.4 °C)   Ht 167.6 cm (66\")   Wt 116 kg (256 lb 12.8 oz)   SpO2 99%   BMI 41.45 kg/m²   Estimated body mass index is 41.45 kg/m² as calculated from the following:    Height as of this encounter: 167.6 cm (66\").    Weight as of this encounter: 116 kg (256 lb 12.8 oz).          Physical Exam  Constitutional:       General: She is not in acute distress.     Appearance: Normal appearance. She is not ill-appearing, toxic-appearing or diaphoretic.   HENT:      Head: Normocephalic and atraumatic.   Eyes:      General: No scleral icterus.        Right eye: No discharge.         Left eye: No discharge.      Extraocular Movements: Extraocular movements intact.   Cardiovascular:      Rate and Rhythm: Normal rate and regular rhythm.   Pulmonary:      Effort: Pulmonary effort is normal. No respiratory distress.      Breath sounds: Normal breath sounds.   Musculoskeletal:      Right lower leg: No edema.   Neurological:      General: No focal deficit present.      Mental Status: She is alert and oriented to person, place, and time. Mental status is at baseline.      Motor: No weakness.      Gait: Gait normal.   Psychiatric:         Mood and Affect: Mood normal.         Behavior: Behavior normal.        Result Review :                Assessment and Plan   Diagnoses and all orders for this visit:    1. Gastroesophageal reflux disease, unspecified whether esophagitis present (Primary)  Assessment & Plan:  Patient symptoms seem to have improved when changing from omeprazole to pantoprazole.  Patient still having some mild symptoms, so pantoprazole has been increased to 40 mg daily.  Patient will follow with gastroenterology as advised for EGD.    Orders:  -     pantoprazole (Protonix) 40 MG EC tablet; Take 1 tablet by mouth Daily.  Dispense: 90 tablet; Refill: 0    2. Elevated serum creatinine    Patient has a history of elevated serum creatinine related to NSAID use.  Last creatinine in March was within normal " limits.  We will repeat creatinine function today.  Patient is also struggling with chronic pain related to her knees.  She has been seen by orthopedics and pain management.  She is also currently in physical therapy.  She was told by pain management she did not meet testing criteria and cannot be prescribed narcotic pain medicine.  Patient states that aquatic physical therapy helps, but she does need another option for pain management.  Patient will follow with orthopedics 6/20/2022 for options.  We could consider doing a very low dose of Celebrex every other day for pain and close monitoring of kidney function.  However, this might not be the best option due to patient's GI history.  She denies adverse symptoms with NSAIDs in the past.  I did advise patient to try Tylenol arthritis prior to his adding on an NSAID.         Follow Up   Return in about 3 months (around 9/10/2022) for Recheck.  Patient was given instructions and counseling regarding her condition or for health maintenance advice. Please see specific information pulled into the AVS if appropriate.     Electronically signed by MJ Valadez, 06/10/22, 3:43 PM EDT.

## 2022-06-10 ENCOUNTER — OFFICE VISIT (OUTPATIENT)
Dept: FAMILY MEDICINE CLINIC | Facility: CLINIC | Age: 47
End: 2022-06-10

## 2022-06-10 VITALS
BODY MASS INDEX: 41.27 KG/M2 | HEART RATE: 100 BPM | OXYGEN SATURATION: 99 % | DIASTOLIC BLOOD PRESSURE: 72 MMHG | TEMPERATURE: 97.5 F | SYSTOLIC BLOOD PRESSURE: 110 MMHG | HEIGHT: 66 IN | WEIGHT: 256.8 LBS

## 2022-06-10 DIAGNOSIS — K21.9 GASTROESOPHAGEAL REFLUX DISEASE, UNSPECIFIED WHETHER ESOPHAGITIS PRESENT: Primary | ICD-10-CM

## 2022-06-10 DIAGNOSIS — R79.89 ELEVATED SERUM CREATININE: ICD-10-CM

## 2022-06-10 PROCEDURE — 99213 OFFICE O/P EST LOW 20 MIN: CPT | Performed by: NURSE PRACTITIONER

## 2022-06-10 RX ORDER — PANTOPRAZOLE SODIUM 40 MG/1
40 TABLET, DELAYED RELEASE ORAL DAILY
Qty: 90 TABLET | Refills: 0 | Status: SHIPPED | OUTPATIENT
Start: 2022-06-10 | End: 2023-04-05

## 2022-06-10 NOTE — ASSESSMENT & PLAN NOTE
Patient symptoms seem to have improved when changing from omeprazole to pantoprazole.  Patient still having some mild symptoms, so pantoprazole has been increased to 40 mg daily.  Patient will follow with gastroenterology as advised for EGD.

## 2022-06-18 ENCOUNTER — LAB (OUTPATIENT)
Dept: LAB | Facility: SURGERY CENTER | Age: 47
End: 2022-06-18

## 2022-06-18 DIAGNOSIS — R07.9 CHEST PAIN, UNSPECIFIED TYPE: ICD-10-CM

## 2022-06-18 DIAGNOSIS — R19.7 DIARRHEA, UNSPECIFIED TYPE: ICD-10-CM

## 2022-06-18 DIAGNOSIS — R11.2 NAUSEA AND VOMITING, UNSPECIFIED VOMITING TYPE: ICD-10-CM

## 2022-06-18 LAB — SARS-COV-2 ORF1AB RESP QL NAA+PROBE: NOT DETECTED

## 2022-06-18 PROCEDURE — U0004 COV-19 TEST NON-CDC HGH THRU: HCPCS | Performed by: NURSE PRACTITIONER

## 2022-06-20 ENCOUNTER — CLINICAL SUPPORT (OUTPATIENT)
Dept: ORTHOPEDIC SURGERY | Facility: CLINIC | Age: 47
End: 2022-06-20

## 2022-06-20 VITALS — TEMPERATURE: 97.8 F | BODY MASS INDEX: 41.46 KG/M2 | HEIGHT: 66 IN | WEIGHT: 258 LBS

## 2022-06-20 DIAGNOSIS — M25.562 PAIN IN BOTH KNEES, UNSPECIFIED CHRONICITY: Primary | ICD-10-CM

## 2022-06-20 DIAGNOSIS — M17.0 PRIMARY OSTEOARTHRITIS OF BOTH KNEES: ICD-10-CM

## 2022-06-20 DIAGNOSIS — M25.561 PAIN IN BOTH KNEES, UNSPECIFIED CHRONICITY: Primary | ICD-10-CM

## 2022-06-20 PROCEDURE — 20610 DRAIN/INJ JOINT/BURSA W/O US: CPT | Performed by: NURSE PRACTITIONER

## 2022-06-20 PROCEDURE — 73562 X-RAY EXAM OF KNEE 3: CPT | Performed by: NURSE PRACTITIONER

## 2022-06-20 RX ORDER — METHYLPREDNISOLONE ACETATE 80 MG/ML
80 INJECTION, SUSPENSION INTRA-ARTICULAR; INTRALESIONAL; INTRAMUSCULAR; SOFT TISSUE
Status: COMPLETED | OUTPATIENT
Start: 2022-06-20 | End: 2022-06-20

## 2022-06-20 RX ORDER — LIDOCAINE HYDROCHLORIDE 10 MG/ML
4 INJECTION, SOLUTION EPIDURAL; INFILTRATION; INTRACAUDAL; PERINEURAL
Status: COMPLETED | OUTPATIENT
Start: 2022-06-20 | End: 2022-06-20

## 2022-06-20 RX ADMIN — LIDOCAINE HYDROCHLORIDE 4 ML: 10 INJECTION, SOLUTION EPIDURAL; INFILTRATION; INTRACAUDAL; PERINEURAL at 14:38

## 2022-06-20 RX ADMIN — METHYLPREDNISOLONE ACETATE 80 MG: 80 INJECTION, SUSPENSION INTRA-ARTICULAR; INTRALESIONAL; INTRAMUSCULAR; SOFT TISSUE at 14:38

## 2022-06-20 NOTE — PROGRESS NOTES
Patient: Aurora Bull  YOB: 1975  Date of Service: 6/20/2022    Chief Complaints:   Chief Complaint   Patient presents with   • Left Knee - Pain, Follow-up   • Right Knee - Pain, Follow-up       Subjective: Hx of 2 knee surgeries in past and has advanced arthritis and desires conservative treatment. Pt is working on weight loss. She has PTSD , a service dog and mood disorder that is in remission. She has been using a walker that is her mothers and that helps and would like her own.     History of Present Illness: Pt is seen in the office today with complaints of   Chief Complaint   Patient presents with   • Left Knee - Pain, Follow-up   • Right Knee - Pain, Follow-up   .  KNEE: TIMING:  The pain is described as ACUTE.  LOCATION: medial joint line tenderness. AGGRAVATING FACTORS:  Is worsened by prolonged standing, sitting, walking and squatting activities.  ASSOCIATED SYMPTOMS:  swelling, tenderness, and aching.    This problem is new to this examiner.     Allergies: No Known Allergies    Medications:   Home Medications:  Current Outpatient Medications on File Prior to Visit   Medication Sig   • clonazePAM (KlonoPIN) 1 MG tablet Take 1 tablet by mouth 2 (Two) Times a Day.   • cyclobenzaprine (FLEXERIL) 10 MG tablet Take 1 tablet by mouth 3 (Three) Times a Day As Needed for Muscle Spasms.   • dicyclomine (BENTYL) 10 MG capsule Take 1 capsule by mouth 3 (Three) Times a Day As Needed (abdominal pain/diarrhea).   • diphenoxylate-atropine (LOMOTIL) 2.5-0.025 MG per tablet Take 1 tablet by mouth 4 (Four) Times a Day As Needed for Diarrhea.   • fluvoxaMINE (LUVOX) 100 MG tablet Take 1 tablet by mouth 2 (Two) Times a Day.   • ipratropium-albuterol (COMBIVENT RESPIMAT)  MCG/ACT inhaler Inhale 1 puff 4 (Four) Times a Day As Needed for Wheezing.   • lamoTRIgine (LaMICtal) 150 MG tablet Take 1 tablet by mouth Daily.   • OLANZapine zydis (zyPREXA) 5 MG disintegrating tablet Place 1 tablet on the tongue  Every Night. dissolve one tablet by mouth for increase psychosis may repeat in 30 minutes if needed. Limit 2 tablets a day   • pantoprazole (Protonix) 40 MG EC tablet Take 1 tablet by mouth Daily.   • prazosin (MINIPRESS) 2 MG capsule Take 2 capsules by mouth Every Night. 1-4 tablets nightly (Patient taking differently: Take 5 mg by mouth Every Night.)   • ziprasidone (GEODON) 80 MG capsule Take 1 capsule by mouth 2 (Two) Times a Day.     No current facility-administered medications on file prior to visit.     Current Medications:  Scheduled Meds:  Continuous Infusions:No current facility-administered medications for this visit.    PRN Meds:.    I have reviewed the patient's medical history in detail and updated the computerized patient record.  Review and summarization of old records include:    Past Medical History:   Diagnosis Date   • Asthma    • Chronic diarrhea    • Chronic knee pain     bilateral   • Chronic lower back pain    • Depression    • Dislocated elbow    • Kidney stones    • Knee swelling    • Low back strain    • Neck strain    • Osgood-Schlatter's disease    • PTSD (post-traumatic stress disorder)    • Schizoaffective disorder (HCC)         Past Surgical History:   Procedure Laterality Date   • ADENOIDECTOMY     • CARDIAC CATHETERIZATION     •  SECTION     • CHOLECYSTECTOMY  2018   • COLONOSCOPY  2019   • ENDOSCOPY  2018   • GASTRIC SLEEVE LAPAROSCOPIC  2014   • HYSTERECTOMY  2019   • KNEE CARTILAGE SURGERY Right    • KNEE SURGERY Right 2016    scope   • NERVE BLOCK Bilateral 3/21/2022    Procedure: KNEE GENICULAR NERVE BLOCK UNDER FLUORO bilateral;  Surgeon: Emily Smith MD;  Location: [facility] [place] MAIN OR;  Service: Pain Management;  Laterality: Bilateral;   • TONSILLECTOMY     • UPPER GASTROINTESTINAL ENDOSCOPY          Social History     Occupational History   • Not on file   Tobacco Use   • Smoking status: Former Smoker     Packs/day: 1.50      Years: 24.00     Pack years: 36.00     Types: Cigarettes   • Smokeless tobacco: Never Used   • Tobacco comment: Quit in 2014   Vaping Use   • Vaping Use: Never used   Substance and Sexual Activity   • Alcohol use: Yes     Alcohol/week: 2.0 standard drinks     Types: 2 Shots of liquor per week     Comment: 2 shots nights   • Drug use: Not Currently   • Sexual activity: Yes      Social History     Social History Narrative   • Not on file        Family History   Problem Relation Age of Onset   • Diabetes Mother    • Diabetes Father    • Hyperlipidemia Father    • Heart disease Father    • Hypertension Father    • Hyperlipidemia Brother    • Diabetes Brother    • Heart block Brother    • Scoliosis Son    • Diabetes Maternal Aunt    • Dwarfism Maternal Uncle    • Hypertension Paternal Uncle    • Mental illness Maternal Grandmother    • Diabetes Maternal Grandmother    • Memory loss Maternal Grandmother    • Esophageal cancer Maternal Grandfather    • Alzheimer's disease Paternal Grandmother    • Hypertension Paternal Grandmother    • Hypertension Paternal Grandfather    • Heart murmur Paternal Grandfather    • Seasonal affective disorder Brother    • Drug abuse Brother    • Mental illness Brother    • Colon cancer Neg Hx    • Colon polyps Neg Hx    • Crohn's disease Neg Hx    • Irritable bowel syndrome Neg Hx    • Ulcerative colitis Neg Hx        ROS: 14 point review of systems was performed and was negative except for documented findings in HPI and today's encounter.     Allergies: No Known Allergies  Constitutional:  Denies fever, shaking or chills   Eyes:  Denies change in visual acuity   HENT:  Denies nasal congestion or sore throat   Respiratory:  Denies cough or shortness of breath   Cardiovascular:  Denies chest pain or severe LE edema   GI:  Denies abdominal pain, nausea, vomiting, bloody stools or diarrhea   Musculoskeletal:  Numbness, tingling, or loss of motor function only as noted above in history of  present illness.  : Denies painful urination or hematuria  Integument:  Denies rash, lesion or ulceration   Neurologic:  Denies headache or focal weakness  Endocrine:  Denies lymphadenopathy  Psych:  Denies confusion or change in mental status   Hem:  Denies active bleeding      Physical Exam: 46 y.o. female  Wt Readings from Last 3 Encounters:   06/20/22 117 kg (258 lb)   06/10/22 116 kg (256 lb 12.8 oz)   05/04/22 117 kg (258 lb 3.2 oz)       Body mass index is 41.64 kg/m².  Facility age limit for growth percentiles is 20 years.  Vitals:    06/20/22 1441   Temp: 97.8 °F (36.6 °C)     Vital signs reviewed.   General Appearance:    Alert, cooperative, in no acute distress                  Eyes: conjunctiva clear  ENT: external ears and nose atraumatic  CV: no peripheral edema  Resp: normal respiratory effort  Skin: no rashes or wounds; normal turgor  Psych: mood and affect appropriate  Lymph: no nodes appreciated  Neuro: gross sensation intact  Vascular:  Palpable peripheral pulse in noted extremity  Musculoskeletal Extremities: KNEE Exam: medial and lateral joint line tenderness with crepitation, synovitis, swelling, and joint effusion bilateral knee.      Diagnostic Data:  Imaging done today and discussed with the patient:    Indication: pain related symptoms,  Views: 3V AP, LAT & 40 degree PA bilateral knee(s)   Findings: advanced arthritis  Comparison views: viewed last xray done in the office.      Procedure:  Large Joint Arthrocentesis: R knee  Date/Time: 6/20/2022 2:38 PM  Consent given by: patient  Site marked: site marked  Timeout: Immediately prior to procedure a time out was called to verify the correct patient, procedure, equipment, support staff and site/side marked as required   Supporting Documentation  Indications: pain, joint swelling and diagnostic evaluation   Procedure Details  Location: knee - R knee  Preparation: Patient was prepped and draped in the usual sterile fashion  Needle gauge:  21G.  Medications administered: 80 mg methylPREDNISolone acetate 80 MG/ML; 4 mL lidocaine PF 1% 1 %  Patient tolerance: patient tolerated the procedure well with no immediate complications    Large Joint Arthrocentesis: L knee  Date/Time: 6/20/2022 2:38 PM  Consent given by: patient  Site marked: site marked  Timeout: Immediately prior to procedure a time out was called to verify the correct patient, procedure, equipment, support staff and site/side marked as required   Supporting Documentation  Indications: pain   Procedure Details  Location: knee - L knee  Preparation: Patient was prepped and draped in the usual sterile fashion  Needle gauge: 21G.  Medications administered: 80 mg methylPREDNISolone acetate 80 MG/ML; 4 mL lidocaine PF 1% 1 %  Patient tolerance: patient tolerated the procedure well with no immediate complications          Assessment:     ICD-10-CM ICD-9-CM   1. Pain in both knees, unspecified chronicity  M25.561 719.46    M25.562    2. Primary osteoarthritis of both knees  M17.0 715.16       Plan:   Follow up as indicated.  Ice, elevate, and rest as needed.  The diagnosis(es), natural history, pathophysiology and treatment for diagnosis(es) were discussed. Opportunity given and questions answered.  Biomechanics of pertinent body areas discussed.  When appropriate, the use of ambulatory aids discussed.  BMI:  The concept of BMI body mass index and its importance and implications discussed.    EXERCISES:  Advice on benefits of, and types of regular/moderate exercise pertaining to orthopedic diagnosis(es).  MEDICATIONS:  The risks, benefits, warnings,side effects and alternatives of medications discussed.  Inflammation/pain control; with cold, heat, elevation and/or liniments discussed as appropriate  Cortisone Injection. See procedure note.  HOME EXERCISE/PT program encouraged  MEDICAL RECORDS reviewed from other provider(s) for past and current medical history pertinent to this  complaint.    6/20/2022

## 2022-06-21 ENCOUNTER — HOSPITAL ENCOUNTER (OUTPATIENT)
Facility: SURGERY CENTER | Age: 47
Setting detail: HOSPITAL OUTPATIENT SURGERY
Discharge: HOME | End: 2022-06-21
Attending: INTERNAL MEDICINE | Admitting: INTERNAL MEDICINE

## 2022-06-21 ENCOUNTER — ANESTHESIA (OUTPATIENT)
Dept: SURGERY | Facility: SURGERY CENTER | Age: 47
End: 2022-06-21

## 2022-06-21 ENCOUNTER — ANESTHESIA EVENT (OUTPATIENT)
Dept: SURGERY | Facility: SURGERY CENTER | Age: 47
End: 2022-06-21

## 2022-06-21 VITALS
OXYGEN SATURATION: 95 % | RESPIRATION RATE: 16 BRPM | TEMPERATURE: 98.6 F | BODY MASS INDEX: 41.78 KG/M2 | WEIGHT: 260 LBS | SYSTOLIC BLOOD PRESSURE: 135 MMHG | DIASTOLIC BLOOD PRESSURE: 84 MMHG | HEIGHT: 66 IN | HEART RATE: 77 BPM

## 2022-06-21 DIAGNOSIS — R07.9 CHEST PAIN, UNSPECIFIED TYPE: ICD-10-CM

## 2022-06-21 DIAGNOSIS — R11.2 NAUSEA AND VOMITING, UNSPECIFIED VOMITING TYPE: ICD-10-CM

## 2022-06-21 DIAGNOSIS — R19.7 DIARRHEA, UNSPECIFIED TYPE: ICD-10-CM

## 2022-06-21 PROCEDURE — 45385 COLONOSCOPY W/LESION REMOVAL: CPT | Performed by: INTERNAL MEDICINE

## 2022-06-21 PROCEDURE — 88305 TISSUE EXAM BY PATHOLOGIST: CPT | Performed by: INTERNAL MEDICINE

## 2022-06-21 PROCEDURE — 88312 SPECIAL STAINS GROUP 1: CPT | Performed by: INTERNAL MEDICINE

## 2022-06-21 PROCEDURE — 43239 EGD BIOPSY SINGLE/MULTIPLE: CPT | Performed by: INTERNAL MEDICINE

## 2022-06-21 PROCEDURE — 45380 COLONOSCOPY AND BIOPSY: CPT | Performed by: INTERNAL MEDICINE

## 2022-06-21 PROCEDURE — 25010000002 PROPOFOL 10 MG/ML EMULSION: Performed by: ANESTHESIOLOGY

## 2022-06-21 RX ORDER — PROPOFOL 10 MG/ML
VIAL (ML) INTRAVENOUS AS NEEDED
Status: DISCONTINUED | OUTPATIENT
Start: 2022-06-21 | End: 2022-06-21 | Stop reason: SURG

## 2022-06-21 RX ORDER — MAGNESIUM HYDROXIDE 1200 MG/15ML
LIQUID ORAL AS NEEDED
Status: DISCONTINUED | OUTPATIENT
Start: 2022-06-21 | End: 2022-06-21 | Stop reason: HOSPADM

## 2022-06-21 RX ORDER — ONDANSETRON 2 MG/ML
4 INJECTION INTRAMUSCULAR; INTRAVENOUS ONCE AS NEEDED
Status: DISCONTINUED | OUTPATIENT
Start: 2022-06-21 | End: 2022-06-21 | Stop reason: HOSPADM

## 2022-06-21 RX ORDER — SODIUM CHLORIDE 0.9 % (FLUSH) 0.9 %
10 SYRINGE (ML) INJECTION AS NEEDED
Status: DISCONTINUED | OUTPATIENT
Start: 2022-06-21 | End: 2022-06-21 | Stop reason: HOSPADM

## 2022-06-21 RX ORDER — PROPOFOL 10 MG/ML
VIAL (ML) INTRAVENOUS CONTINUOUS PRN
Status: DISCONTINUED | OUTPATIENT
Start: 2022-06-21 | End: 2022-06-21 | Stop reason: SURG

## 2022-06-21 RX ORDER — SODIUM CHLORIDE, SODIUM LACTATE, POTASSIUM CHLORIDE, CALCIUM CHLORIDE 600; 310; 30; 20 MG/100ML; MG/100ML; MG/100ML; MG/100ML
30 INJECTION, SOLUTION INTRAVENOUS CONTINUOUS PRN
Status: DISCONTINUED | OUTPATIENT
Start: 2022-06-21 | End: 2022-06-21 | Stop reason: HOSPADM

## 2022-06-21 RX ORDER — SODIUM CHLORIDE, SODIUM LACTATE, POTASSIUM CHLORIDE, CALCIUM CHLORIDE 600; 310; 30; 20 MG/100ML; MG/100ML; MG/100ML; MG/100ML
1000 INJECTION, SOLUTION INTRAVENOUS CONTINUOUS
Status: DISCONTINUED | OUTPATIENT
Start: 2022-06-21 | End: 2022-06-21 | Stop reason: HOSPADM

## 2022-06-21 RX ORDER — LIDOCAINE HYDROCHLORIDE 10 MG/ML
0.5 INJECTION, SOLUTION INFILTRATION; PERINEURAL ONCE AS NEEDED
Status: DISCONTINUED | OUTPATIENT
Start: 2022-06-21 | End: 2022-06-21 | Stop reason: HOSPADM

## 2022-06-21 RX ORDER — SODIUM CHLORIDE 0.9 % (FLUSH) 0.9 %
3 SYRINGE (ML) INJECTION EVERY 12 HOURS SCHEDULED
Status: DISCONTINUED | OUTPATIENT
Start: 2022-06-21 | End: 2022-06-21 | Stop reason: HOSPADM

## 2022-06-21 RX ORDER — LIDOCAINE HYDROCHLORIDE 20 MG/ML
INJECTION, SOLUTION INFILTRATION; PERINEURAL AS NEEDED
Status: DISCONTINUED | OUTPATIENT
Start: 2022-06-21 | End: 2022-06-21 | Stop reason: SURG

## 2022-06-21 RX ADMIN — LIDOCAINE HYDROCHLORIDE 40 MG: 20 INJECTION, SOLUTION INFILTRATION; PERINEURAL at 12:00

## 2022-06-21 RX ADMIN — PROPOFOL INJECTABLE EMULSION 50 MG: 10 INJECTION, EMULSION INTRAVENOUS at 12:04

## 2022-06-21 RX ADMIN — Medication 200 MCG/KG/MIN: at 12:00

## 2022-06-21 RX ADMIN — SODIUM CHLORIDE, POTASSIUM CHLORIDE, SODIUM LACTATE AND CALCIUM CHLORIDE 30 ML/HR: 600; 310; 30; 20 INJECTION, SOLUTION INTRAVENOUS at 11:17

## 2022-06-21 RX ADMIN — PROPOFOL INJECTABLE EMULSION 150 MG: 10 INJECTION, EMULSION INTRAVENOUS at 12:00

## 2022-06-21 NOTE — ANESTHESIA POSTPROCEDURE EVALUATION
"Patient: Aurora Bull    Procedure Summary     Date: 06/21/22 Room / Location: [facility]    Anesthesia Start: 1155 Anesthesia Stop: 1226    Procedures:       ESOPHAGOGASTRODUODENOSCOPY WITH BIOPSIES (N/A )      COLONOSCOPY, BIOPSIES, POLYPECTOMY (N/A ) Diagnosis:       Chest pain, unspecified type      Nausea and vomiting, unspecified vomiting type      Diarrhea, unspecified type      (Chest pain, unspecified type [R07.9])      (Nausea and vomiting, unspecified vomiting type [R11.2])      (Diarrhea, unspecified type [R19.7])    Surgeons: Jerald Keller MD Provider: Nelida Harper MD    Anesthesia Type: MAC ASA Status: 3          Anesthesia Type: MAC    Vitals  Vitals Value Taken Time   /84 06/21/22 1252   Temp 37 °C (98.6 °F) 06/21/22 1228   Pulse 77 06/21/22 1252   Resp 16 06/21/22 1252   SpO2 95 % 06/21/22 1252           Post Anesthesia Care and Evaluation    Patient location during evaluation: PHASE II  Patient participation: complete - patient participated  Level of consciousness: awake  Pain management: adequate    Airway patency: patent  Anesthetic complications: No anesthetic complications    Cardiovascular status: acceptable  Respiratory status: acceptable  Hydration status: acceptable    Comments: /84   Pulse 77   Temp 37 °C (98.6 °F) (Temporal)   Resp 16   Ht 167.6 cm (66\")   Wt 118 kg (260 lb)   SpO2 95%   BMI 41.97 kg/m²       "

## 2022-06-21 NOTE — ANESTHESIA PREPROCEDURE EVALUATION
Anesthesia Evaluation     Patient summary reviewed and Nursing notes reviewed   NPO Solid Status: > 8 hours  NPO Liquid Status: > 2 hours           Airway   Mallampati: II  TM distance: <3 FB  Neck ROM: full  Dental - normal exam     Pulmonary    (+) asthma,  Cardiovascular     ECG reviewed    (+) angina,     ROS comment: Reviewed stress test    Neuro/Psych  (+) psychiatric history PTSD, Anxiety and Depression,    GI/Hepatic/Renal/Endo    (+) morbid obesity, GERD,  renal disease stones,     Musculoskeletal     Abdominal   (+) obese,    Substance History      OB/GYN          Other                      Anesthesia Plan    ASA 3     MAC       Anesthetic plan, risks, benefits, and alternatives have been provided, discussed and informed consent has been obtained with: patient.        CODE STATUS:

## 2022-06-21 NOTE — H&P
No chief complaint on file.      HPI  Patient here for an EGD due to chest pain and GERD and colonoscopy for screening and history of diarrhea.         Problem List:    Patient Active Problem List   Diagnosis   • Schizoaffective disorder ([facility])   • Mild intermittent asthma without complication   • Vitamin D deficiency   • Preinfarction syndrome ([facility])   • Moderate persistent asthma   • Iron deficiency anemia   • Generalized anxiety disorder   • Gastroesophageal reflux disease   • Family history of coronary arteriosclerosis   • Ex-smoker   • Blood in urine   • Knee pain   • Glaucoma suspect of both eyes   • Body mass index 40.0-44.9, adult ([facility])   • Chronic diarrhea   • Osteoarthritis of both knees   • Chest pain   • Nausea and vomiting   • Diarrhea       Medical History:    Past Medical History:   Diagnosis Date   • Asthma    • Chronic diarrhea    • Chronic knee pain     bilateral   • Chronic lower back pain    • Depression    • Dislocated elbow 1980   • Kidney stones    • Knee swelling 2014   • Low back strain 2017   • Neck strain 2017   • Osgood-Schlatter's disease 1988   • PTSD (post-traumatic stress disorder) 2014   • Schizoaffective disorder ([facility]) 2012        Social History:    Social History     Socioeconomic History   • Marital status:    Tobacco Use   • Smoking status: Former Smoker     Packs/day: 1.50     Years: 24.00     Pack years: 36.00     Types: Cigarettes   • Smokeless tobacco: Never Used   • Tobacco comment: Quit in 2014   Vaping Use   • Vaping Use: Never used   Substance and Sexual Activity   • Alcohol use: Yes     Alcohol/week: 2.0 standard drinks     Types: 2 Shots of liquor per week     Comment: 2 shots nights   • Drug use: Not Currently   • Sexual activity: Yes       Family History:   Family History   Problem Relation Age of Onset   • Diabetes Mother    • Diabetes Father    • Hyperlipidemia Father    • Heart disease Father    • Hypertension Father    • Hyperlipidemia Brother    • Diabetes  Brother    • Heart block Brother    • Scoliosis Son    • Diabetes Maternal Aunt    • Dwarfism Maternal Uncle    • Hypertension Paternal Uncle    • Mental illness Maternal Grandmother    • Diabetes Maternal Grandmother    • Memory loss Maternal Grandmother    • Esophageal cancer Maternal Grandfather    • Alzheimer's disease Paternal Grandmother    • Hypertension Paternal Grandmother    • Hypertension Paternal Grandfather    • Heart murmur Paternal Grandfather    • Seasonal affective disorder Brother    • Drug abuse Brother    • Mental illness Brother    • Colon cancer Neg Hx    • Colon polyps Neg Hx    • Crohn's disease Neg Hx    • Irritable bowel syndrome Neg Hx    • Ulcerative colitis Neg Hx        Surgical History:   Past Surgical History:   Procedure Laterality Date   • ADENOIDECTOMY     • CARDIAC CATHETERIZATION     •  SECTION     • CHOLECYSTECTOMY     • COLONOSCOPY     • ENDOSCOPY     • GASTRIC SLEEVE LAPAROSCOPIC     • HYSTERECTOMY  2019   • KNEE CARTILAGE SURGERY Right 2015   • KNEE SURGERY Right 2016    scope   • NERVE BLOCK Bilateral 3/21/2022    Procedure: KNEE GENICULAR NERVE BLOCK UNDER FLUORO bilateral;  Surgeon: Emily Smith MD;  [facility] [place] [facility];  Service: Pain Management;  Laterality: Bilateral;   • TONSILLECTOMY     • UPPER GASTROINTESTINAL ENDOSCOPY           Current Facility-Administered Medications:   •  lactated ringers infusion, 30 mL/hr, Intravenous, Continuous PRN, Johan, Marilyn G, APRN  •  sodium chloride 0.9 % flush 10 mL, 10 mL, Intravenous, PRN, Johan, Marilyn G, APRN  •  sodium chloride 0.9 % flush 3 mL, 3 mL, Intravenous, Q12H, Johan, Marilyn G, APRN    Allergies: No Known Allergies     The following portions of the patient's history were reviewed by me and updated as appropriate: review of systems, allergies, current medications, past family history, past medical history, past social history, past surgical history and problem  list.    There were no vitals filed for this visit.    PHYSICAL EXAM:    CONSTITUTIONAL:  today's vital signs reviewed by me  GASTROINTESTINAL: abdomen is soft nontender nondistended with normal active bowel sounds, no masses are appreciated    Assessment/ Plan  We will proceed with EGD and colonoscopy.    Risks and benefits as well as alternatives to endoscopic evaluation were explained to the patient and they voiced understanding and wish to proceed.  These risks include but are not limited to the risk of bleeding, perforation, adverse reaction to sedation, and missed lesions.  The patient was given the opportunity to ask questions prior to the endoscopic procedure.

## 2022-06-22 LAB
LAB AP CASE REPORT: NORMAL
LAB AP CLINICAL INFORMATION: NORMAL
LAB AP DIAGNOSIS COMMENT: NORMAL
PATH REPORT.ADDENDUM SPEC: NORMAL
PATH REPORT.FINAL DX SPEC: NORMAL
PATH REPORT.GROSS SPEC: NORMAL

## 2022-06-28 ENCOUNTER — TREATMENT (OUTPATIENT)
Dept: PHYSICAL THERAPY | Facility: CLINIC | Age: 47
End: 2022-06-28

## 2022-06-28 DIAGNOSIS — R26.2 DIFFICULTY WALKING: ICD-10-CM

## 2022-06-28 DIAGNOSIS — G89.29 CHRONIC PAIN OF BOTH KNEES: ICD-10-CM

## 2022-06-28 DIAGNOSIS — M17.10 ARTHRITIS OF KNEE: Primary | ICD-10-CM

## 2022-06-28 DIAGNOSIS — M25.562 CHRONIC PAIN OF BOTH KNEES: ICD-10-CM

## 2022-06-28 DIAGNOSIS — M25.561 CHRONIC PAIN OF BOTH KNEES: ICD-10-CM

## 2022-06-28 PROCEDURE — 97113 AQUATIC THERAPY/EXERCISES: CPT | Performed by: PHYSICAL THERAPIST

## 2022-06-28 NOTE — PROGRESS NOTES
"Physical Therapy Daily Treatment Note    Patient: Aurora Bull   : 1975  Diagnosis/ICD-10 Code:  Arthritis of knee [M17.10]  Referring practitioner: MJ Patel  Date of Initial Visit: Type: THERAPY  Noted: 2022  Today's Date: 2022  Patient seen for 4 sessions             Subjective   Aurora reports she flared up her R knee and couldn't walk for a bit. It's starting to do a little better now.    Objective       AQUATIC EXERCISES:  Water Walk : forward, side step; 2 laps each              Stretch 1: Hamstring hip sweeps with LARGE noodle x15                  Stretch 2: Quad/hip flexor with LARGE noodle under knee, 30-45 sec             Stretch 3: Calf; 30 sec B  Hip Abd: 15x               Hip Ext: 15x  SLR: 15x   Hip Circles: 10/10   Hamstring curls: 20x               March in Place (Alternating): 30x   Walking Marches: 2 laps  Tandem Walk: 2 laps  Tip Toe Walk 2 laps   Leg Press: Large Blue ring 20x B          Mini Squat: 15x                  Heel Raises: S/L 20x each                     Uni-Clock: UE on LN, 1 min with each leg.                    Step Ups  8\" Height: 20x B Forward, 20x Lateral    Suspended with LN:                 Bicycle: 3 min                          Seated in hot tub: *independent  Flutter/Scissor 1 min each    LAQ x20      Assessment/Plan  Patient was given a rollator by her MD but she is trying not to rely on it all the time. She had significantly more R quad tightness today with noodle stretch. Also some discomfort in anterior R knee with hamstring curls and step ups. Attempted to utilize deeper water to lessen the compressive forces on the knee joint.          Timed:  Aquatic Therapy    38     mins [id number];    Aurora Niño, PT  Physical Therapist    [place] License: [id number]  "

## 2022-06-30 ENCOUNTER — TREATMENT (OUTPATIENT)
Dept: PHYSICAL THERAPY | Facility: CLINIC | Age: 47
End: 2022-06-30

## 2022-06-30 DIAGNOSIS — M25.562 CHRONIC PAIN OF BOTH KNEES: ICD-10-CM

## 2022-06-30 DIAGNOSIS — G89.29 CHRONIC PAIN OF BOTH KNEES: ICD-10-CM

## 2022-06-30 DIAGNOSIS — R26.2 DIFFICULTY WALKING: ICD-10-CM

## 2022-06-30 DIAGNOSIS — M17.10 ARTHRITIS OF KNEE: Primary | ICD-10-CM

## 2022-06-30 DIAGNOSIS — M25.561 CHRONIC PAIN OF BOTH KNEES: ICD-10-CM

## 2022-06-30 PROCEDURE — 97113 AQUATIC THERAPY/EXERCISES: CPT | Performed by: PHYSICAL THERAPIST

## 2022-06-30 NOTE — PROGRESS NOTES
"Physical Therapy Daily Treatment Note    Patient: Aurora Bull   : 1975  Diagnosis/ICD-10 Code:  Arthritis of knee [M17.10]  Referring practitioner: MJ Patel  Date of Initial Visit: Type: THERAPY  Noted: 2022  Today's Date: 2022  Patient seen for 5 sessions             Subjective   Aurora is still unable to tolerate relying on R LE with stairs.     Objective     AQUATIC EXERCISES:  Water Walk : forward, side step; 2 laps each              Stretch 1: Hamstring hip sweeps with LARGE noodle x15                  Stretch 2: Quad/hip flexor with small noodle under knee, 30-45 sec             Stretch 3: Calf; 30 sec B  Hip Abd: 15x               Hip Ext: 15x  SLR: 15x   Hip Circles: 10/10   Hamstring curls: 20x               Walking Marches: 2 laps  Tandem Walk: 2 laps  Tip Toe Walk 2 laps   Leg Press: Large Blue ring 20x B          Mini Squat: 15x                  Heel Raises: 20x B LE               Uni-Clock: UE on LN, 1 min with each leg.                    Step Ups  8\" Height: 20x Forward, 20x Lateral  (L LE only)  Suspended with LN:                 Bicycle: 3 min                          Seated in hot tub: *independent  Flutter/Scissor 1 min each    LAQ x20      Assessment/Plan  Reverted back to small noodle for quad stretch as there was too much compensation from hip abduction today. Less lateral trunk lean noted with tandem walking but there is still some mild forward trunk flexion. Deferred step ups with R LE today due to pain and reverted back to B heel raises.          Timed:  Aquatic Therapy    38     mins 01525;    Aurora Niño, PT  Physical Therapist    [place] License: [id number]  "

## 2022-07-21 ENCOUNTER — DOCUMENTATION (OUTPATIENT)
Dept: PHYSICAL THERAPY | Facility: CLINIC | Age: 47
End: 2022-07-21

## 2022-07-21 DIAGNOSIS — M25.562 CHRONIC PAIN OF BOTH KNEES: ICD-10-CM

## 2022-07-21 DIAGNOSIS — R26.2 DIFFICULTY WALKING: ICD-10-CM

## 2022-07-21 DIAGNOSIS — M17.10 ARTHRITIS OF KNEE: Primary | ICD-10-CM

## 2022-07-21 DIAGNOSIS — G89.29 CHRONIC PAIN OF BOTH KNEES: ICD-10-CM

## 2022-07-21 DIAGNOSIS — M25.561 CHRONIC PAIN OF BOTH KNEES: ICD-10-CM

## 2022-07-21 NOTE — PROGRESS NOTES
Discharge Summary  Discharge Summary from Physical Therapy Report      Dates  PT visit: 5/6/22-6/30/22  Number of Visits: 5     Goals: Partially Met    Goals  Plan Goals: STGs to be completed within 30 days:  -Patient will demonstrate compliance and independence with initial HEP (MET)  -Patient will increase B Knee AROM to 9-95 degrees to help normalize gait mechanics and increase ease with transfers (PARTIALLY MET for flexion)  -Patient will perform sit to stand transfer with equilateral WB and no UE assistance (NOT MET)    LTGs to be completed within 90 days:  -Patient will increase B Knee AROM to 5-100 degrees to help normalize gait mechanics and increase ease with transfers (NOT MET)  -Patient will demonstrate safety and independence with advanced Aquatic HEP to facilitate self management of condition (MET)  -Patient will improve score on KOS from 31 at eval to 40 or greater to improve quality of life ((NOT MET)         Discharge Plan: Continue with current home exercise program as instructed    Comments : had to discontinue PT to care for family member     Date of Discharge : 7/21/22        Aurora Niño, PT  Physical Therapist

## 2022-08-09 ENCOUNTER — OFFICE VISIT (OUTPATIENT)
Dept: GASTROENTEROLOGY | Facility: CLINIC | Age: 47
End: 2022-08-09

## 2022-08-09 VITALS
DIASTOLIC BLOOD PRESSURE: 72 MMHG | WEIGHT: 256.1 LBS | HEART RATE: 96 BPM | SYSTOLIC BLOOD PRESSURE: 140 MMHG | TEMPERATURE: 98.2 F | OXYGEN SATURATION: 94 % | BODY MASS INDEX: 41.16 KG/M2 | HEIGHT: 66 IN

## 2022-08-09 DIAGNOSIS — K58.0 IRRITABLE BOWEL SYNDROME WITH DIARRHEA: Primary | ICD-10-CM

## 2022-08-09 DIAGNOSIS — K21.00 GASTROESOPHAGEAL REFLUX DISEASE WITH ESOPHAGITIS WITHOUT HEMORRHAGE: ICD-10-CM

## 2022-08-09 DIAGNOSIS — K44.9 HIATAL HERNIA: ICD-10-CM

## 2022-08-09 PROCEDURE — 99214 OFFICE O/P EST MOD 30 MIN: CPT | Performed by: NURSE PRACTITIONER

## 2022-08-09 RX ORDER — DICYCLOMINE HCL 20 MG
20 TABLET ORAL 4 TIMES DAILY PRN
Qty: 120 TABLET | Refills: 2 | Status: SHIPPED | OUTPATIENT
Start: 2022-08-09

## 2022-08-09 NOTE — PATIENT INSTRUCTIONS
For diarrhea, we will increase dicyclomine to 20 mg up to 4 times daily. Prescription sent to pharmacy.    For diarrhea, recommend trial of low FODMAP diet.  Handout provided for review.    For GERD with hiatal hernia, follow antireflux precautions.  Recommend avoiding eating within 3 to 4 hours of bedtime.  Avoid foods that can trigger symptoms which may include citrus fruits, spicy foods, tomatoes and red sauces, chocolate, coffee/tea, caffeinated or carbonated beverages, alcohol.      For GERD with esophagitis, continue pantoprazole daily as prescribed.    Call for any new or worsening symptoms or failure to improve.

## 2022-08-09 NOTE — PROGRESS NOTES
"Chief Complaint   Patient presents with   • Diarrhea         History of Present Illness  Patient is a 47-year-old female who presents today for follow-up.  She has a history of IBS-D and past history of elevated fecal calprotectin.  EGD and colonoscopy were performed in June 2022.  EGD with 2 cm hiatal hernia and LA grade a esophagitis.  Colonoscopy with 1 hyperplastic polyp, otherwise normal endoscopically.  Random colon biopsies were unremarkable.    Patient presents today for follow-up.  She reports she has been experiencing persistent diarrhea.  She generally has 3-4 bowel movements per day.  She reports urgency after eating and has difficulty eating outside the home due to her symptoms.    She has been taking dicyclomine which has led to some improvement in stool frequency but not control symptoms.  Also uses Lomotil as needed.  She tried colestipol after last office visit but felt that this worsened symptoms.  Xifaxan was not covered affordably by her insurance.    Patient has been taking pantoprazole for esophagitis.  Reports she continues to have some discomfort in her chest and episodic vomiting.     Result Review :       COLONOSCOPY (06/21/2022 11:49)   UPPER GI ENDOSCOPY (06/21/2022 11:50)   Office Visit with JohanMarilyn fang, APRN (05/04/2022)   Calprotectin, Fecal - Stool, Per Rectum (12/20/2021 11:15)   Clostridium Difficile Toxin, PCR - Stool, Per Rectum (12/20/2021 11:15)   Office Visit with Johan Marilyn G, APRN (12/17/2021)   C-reactive Protein (11/19/2021 14:15)   Celiac Disease Panel (11/19/2021 14:15)   Office Visit with Johan, Marilyn G, APRN (11/19/2021)   Ferritin (11/01/2021 16:00)       Vital Signs:   /72   Pulse 96   Temp 98.2 °F (36.8 °C)   Ht 167.6 cm (66\")   Wt 116 kg (256 lb 1.6 oz)   SpO2 94%   BMI 41.34 kg/m²     Body mass index is 41.34 kg/m².     Physical Exam  Vitals reviewed.   Constitutional:       General: She is not in acute distress.     Appearance: She is " well-developed.   HENT:      Head: Normocephalic and atraumatic.   Pulmonary:      Effort: Pulmonary effort is normal. No respiratory distress.   Skin:     General: Skin is dry.      Coloration: Skin is not pale.   Neurological:      Mental Status: She is alert and oriented to person, place, and time.   Psychiatric:         Thought Content: Thought content normal.           Assessment and Plan    Diagnoses and all orders for this visit:    1. Irritable bowel syndrome with diarrhea (Primary)    2. Gastroesophageal reflux disease with esophagitis without hemorrhage    3. Hiatal hernia    Other orders  -     dicyclomine (BENTYL) 20 MG tablet; Take 1 tablet by mouth 4 (Four) Times a Day As Needed (diarrhea).  Dispense: 120 tablet; Refill: 2         Discussion  Patient presents today for follow-up.  EGD and colonoscopy findings reviewed at today's office visit.  Discussed dietary and lifestyle modifications to help with reflux and will continue daily proton pump inhibitor for esophagitis.    Discussed with patient today feel diarrhea is secondary to irritable bowel syndrome with diarrhea, also possible component of postcholecystectomy diarrhea.  As she has had some improvement with dicyclomine, we will plan to increase dose of dicyclomine.  If this does not lead to significant improvement, patient instructed notify the office and we can proceed with trial of cholestyramine or WelChol.          Follow Up   Return in about 1 year (around 8/9/2023), or if symptoms worsen or fail to improve.    Patient Instructions   For diarrhea, we will increase dicyclomine to 20 mg up to 4 times daily. Prescription sent to pharmacy.    For diarrhea, recommend trial of low FODMAP diet.  Handout provided for review.    For GERD with hiatal hernia, follow antireflux precautions.  Recommend avoiding eating within 3 to 4 hours of bedtime.  Avoid foods that can trigger symptoms which may include citrus fruits, spicy foods, tomatoes and red  sauces, chocolate, coffee/tea, caffeinated or carbonated beverages, alcohol.      For GERD with esophagitis, continue pantoprazole daily as prescribed.    Call for any new or worsening symptoms or failure to improve.

## 2022-08-30 NOTE — PROGRESS NOTES
Chief Complaint  Hip Pain (Right hip pain for  about 2 weeks ,sometimes can not walk and getting worse  last weeks )    Masks/face shield/appropriate PPE were worn for the entirety of the visit by the patient, MA, and provider.     Subjective        Aurora Bull presents to [facility]  History of Present Illness  Aurora Bull is a 47 y.o. female who presents to the clinic today for a routine follow-up appointment.  Patient has concerns of right hip pain.    Patient complains today of right hip pain.  Her pain started 2 weeks ago.  It is worsened by sitting and standing.  Her symptoms are also worsened by lying on her right hip.  Lying on her stomach improves her symptoms.  Patient does have chronic low back pain as well as chronic knee pain, so she is not sure if the right hip pain is related to this.  Her right hip pain feels stabbing and aching.  She denies radiating pain.  She denies numbness or tingling in her legs.  She denies lower extremity swelling.  She denies recent falls or injuries.  Patient has not been taking NSAIDs because of the impact NSAIDs have had in the past on her kidneys.  She has been taking Tylenol, but this has not helped.  Patient is also frustrated because she has bilateral chronic knee pain and has been told that she needs a knee replacement, but her orthopedic doctor will not perform a knee replacement until she is in her 50s and if she loses weight.  Patient has been to a pain management provider, but told they cannot prescribe controlled substance pain medications for her.  She has been receiving steroid injections in her knees.  This has helped minimally and she has also been through physical therapy and aquatic therapy, but has had no improvement of her symptoms.  She has been told that her osteoarthritis is too severe.    Of note, patient continues to follow with gastroenterology for chronic diarrhea.  Patient had an EGD and colonoscopy  "performed in June.  EGD reportedly showing 2 cm hiatal hernia and LA grade a esophagitis.  Colonoscopy with 1 hyperplastic polyp.  Patient was continued on dicyclomine for her symptoms.    Review of Systems   Musculoskeletal: Positive for arthralgias.       Objective   Vital Signs:  /78   Pulse 98   Temp 97.8 °F (36.6 °C)   Ht 167.6 cm (66\")   Wt 113 kg (250 lb)   SpO2 99%   BMI 40.35 kg/m²   Estimated body mass index is 40.35 kg/m² as calculated from the following:    Height as of this encounter: 167.6 cm (66\").    Weight as of this encounter: 113 kg (250 lb).          Physical Exam  Constitutional:       General: She is not in acute distress.     Appearance: Normal appearance. She is not ill-appearing, toxic-appearing or diaphoretic.   HENT:      Head: Normocephalic and atraumatic.   Pulmonary:      Effort: Pulmonary effort is normal. No respiratory distress.   Musculoskeletal:      Right hip: Tenderness present. No deformity, lacerations or crepitus. Normal range of motion.      Right lower leg: No edema.      Left lower leg: No edema.   Neurological:      General: No focal deficit present.      Mental Status: She is alert and oriented to person, place, and time.      Motor: No weakness.      Gait: Gait normal.   Psychiatric:         Mood and Affect: Mood normal.         Behavior: Behavior normal.         Thought Content: Thought content normal.         Judgment: Judgment normal.        Result Review :         THERAPIES - SCAN - PSYCH PAIN SHIVA JACOBY, 04/26/2022 (04/26/2022)           Assessment and Plan   Diagnoses and all orders for this visit:    1. Right hip pain (Primary)  -     methylPREDNISolone (MEDROL) 4 MG dose pack; Take as directed on package instructions.  Dispense: 21 each; Refill: 0    2. History of ongoing treatment with high-risk medication  -     ToxASSURE Select 13 (MW) - Urine, Clean Catch    3. Osteoarthritis of both knees, unspecified osteoarthritis type  -     " HYDROcodone-acetaminophen (NORCO) 5-325 MG per tablet; Take 1 tablet by mouth Every 8 (Eight) Hours As Needed for Severe Pain.  Dispense: 7 tablet; Refill: 0    4. Chronic pain due to injury  -     HYDROcodone-acetaminophen (NORCO) 5-325 MG per tablet; Take 1 tablet by mouth Every 8 (Eight) Hours As Needed for Severe Pain.  Dispense: 7 tablet; Refill: 0      Patient complains of right hip pain.  Based on lateral location of patient's pain and pain elicited with direct pressure, patient likely has greater trochanteric bursitis.  She also, however, has posterior hip pain which could be related to a gluteal etiology. This could also be related to her chronic back pain.  We will treat patient with a Medrol Dosepak.  She was advised to take this in the morning with food.  Patient has tolerated steroids well in the past.    Patient has chronic pain.  She has been to see orthopedic surgery and has received steroid injections in her knee.  She has also undergone physical therapy and aquatic therapy.  She was referred to pain management, but they are not willing to prescribe pain medications for her.  Patient denies past history of substance abuse.  Patient's schizoaffective disorder and anxiety disorder are well maintained by her psychiatry nurse practitioner, Juan David Abrams, at [facility].  Patient has been on narcotic pain medication in the past and tolerated well.  Due to patient's chronic pain and diagnosed extensive osteoarthritis of the bilateral knees and continued pain despite multiple treatments,  I have prescribed a short course of hydrocodone-acetaminophen for the patient.  Urine drug screen will be performed today.  I will consider maintaining as needed hydrocodone/acetaminophen prescription for the patient.  We discussed keeping this medication in a locked box at home.  Patient is aware of potential adverse effects and risk of dependency on his medications.  She is aware that this is not a medication for daily  use, but only as needed for severe pain.  Controlled substance contract was signed today and thoroughly reviewed.  ADAM reviewed.  Patient is not taking Klonopin on a daily basis.  Patient is aware taking this with narcotic pain medication can intensive neurologic side effects.  I did talk with [facility].  Unfortunately, patient's psychiatry nurse practitioner is on maternity leave.  I discussed patient's medications and the case with another nurse practitioner in the office.  She cannot speak to the patient specifically as she has not seen her in person, but does not see red flags at this time for prescribing of this medication.  I would like patient's psychiatric nurse practitioner to review psychiatric evaluation by [facility] prior to considering long-term narcotic use.       Follow Up   Return in about 3 months (around 11/30/2022), or if symptoms worsen or fail to improve, for Recheck- follow-up for pain management.  Patient was given instructions and counseling regarding her condition or for health maintenance advice. Please see specific information pulled into the AVS if appropriate.     Electronically signed by MJ Valadez, 08/31/22, 3:42 PM EDT.

## 2022-08-31 ENCOUNTER — OFFICE VISIT (OUTPATIENT)
Dept: FAMILY MEDICINE CLINIC | Facility: CLINIC | Age: 47
End: 2022-08-31

## 2022-08-31 VITALS
BODY MASS INDEX: 40.18 KG/M2 | TEMPERATURE: 97.8 F | WEIGHT: 250 LBS | DIASTOLIC BLOOD PRESSURE: 78 MMHG | SYSTOLIC BLOOD PRESSURE: 126 MMHG | OXYGEN SATURATION: 99 % | HEIGHT: 66 IN | HEART RATE: 98 BPM

## 2022-08-31 DIAGNOSIS — G89.21 CHRONIC PAIN DUE TO INJURY: ICD-10-CM

## 2022-08-31 DIAGNOSIS — Z79.899 HISTORY OF ONGOING TREATMENT WITH HIGH-RISK MEDICATION: ICD-10-CM

## 2022-08-31 DIAGNOSIS — M17.0 OSTEOARTHRITIS OF BOTH KNEES, UNSPECIFIED OSTEOARTHRITIS TYPE: ICD-10-CM

## 2022-08-31 DIAGNOSIS — M25.551 RIGHT HIP PAIN: Primary | ICD-10-CM

## 2022-08-31 PROCEDURE — 99214 OFFICE O/P EST MOD 30 MIN: CPT | Performed by: NURSE PRACTITIONER

## 2022-08-31 RX ORDER — METHYLPREDNISOLONE 4 MG/1
TABLET ORAL
Qty: 21 EACH | Refills: 0 | Status: SHIPPED | OUTPATIENT
Start: 2022-08-31 | End: 2022-11-30

## 2022-08-31 RX ORDER — HYDROCODONE BITARTRATE AND ACETAMINOPHEN 5; 325 MG/1; MG/1
1 TABLET ORAL EVERY 8 HOURS PRN
Qty: 7 TABLET | Refills: 0 | Status: SHIPPED | OUTPATIENT
Start: 2022-08-31 | End: 2022-09-18 | Stop reason: SDUPTHER

## 2022-09-07 LAB — DRUGS UR: NORMAL

## 2022-09-12 DIAGNOSIS — M25.551 RIGHT HIP PAIN: Primary | ICD-10-CM

## 2022-09-18 DIAGNOSIS — M17.0 OSTEOARTHRITIS OF BOTH KNEES, UNSPECIFIED OSTEOARTHRITIS TYPE: ICD-10-CM

## 2022-09-18 DIAGNOSIS — G89.21 CHRONIC PAIN DUE TO INJURY: ICD-10-CM

## 2022-09-19 RX ORDER — HYDROCODONE BITARTRATE AND ACETAMINOPHEN 5; 325 MG/1; MG/1
1 TABLET ORAL EVERY 8 HOURS PRN
Qty: 7 TABLET | Refills: 0 | Status: SHIPPED | OUTPATIENT
Start: 2022-09-19 | End: 2022-11-09 | Stop reason: SDUPTHER

## 2022-09-19 NOTE — TELEPHONE ENCOUNTER
Rx Refill Note  Requested Prescriptions     Pending Prescriptions Disp Refills   • HYDROcodone-acetaminophen (NORCO) 5-325 MG per tablet 7 tablet 0     Sig: Take 1 tablet by mouth Every 8 (Eight) Hours As Needed for Severe Pain.      Last office visit with prescribing clinician: 8/31/2022      Next office visit with prescribing clinician: 11/30/2022            Shar Cruz MA  09/19/22, 08:06 EDT

## 2022-09-21 ENCOUNTER — CLINICAL SUPPORT (OUTPATIENT)
Dept: ORTHOPEDIC SURGERY | Facility: CLINIC | Age: 47
End: 2022-09-21

## 2022-09-21 DIAGNOSIS — M17.0 PRIMARY OSTEOARTHRITIS OF BOTH KNEES: Primary | ICD-10-CM

## 2022-09-21 PROCEDURE — 20610 DRAIN/INJ JOINT/BURSA W/O US: CPT | Performed by: NURSE PRACTITIONER

## 2022-09-21 RX ORDER — LIDOCAINE HYDROCHLORIDE 10 MG/ML
2 INJECTION, SOLUTION EPIDURAL; INFILTRATION; INTRACAUDAL; PERINEURAL
Status: COMPLETED | OUTPATIENT
Start: 2022-09-21 | End: 2022-09-21

## 2022-09-21 RX ORDER — METHYLPREDNISOLONE ACETATE 80 MG/ML
80 INJECTION, SUSPENSION INTRA-ARTICULAR; INTRALESIONAL; INTRAMUSCULAR; SOFT TISSUE
Status: COMPLETED | OUTPATIENT
Start: 2022-09-21 | End: 2022-09-21

## 2022-09-21 RX ADMIN — METHYLPREDNISOLONE ACETATE 80 MG: 80 INJECTION, SUSPENSION INTRA-ARTICULAR; INTRALESIONAL; INTRAMUSCULAR; SOFT TISSUE at 15:31

## 2022-09-21 RX ADMIN — LIDOCAINE HYDROCHLORIDE 2 ML: 10 INJECTION, SOLUTION EPIDURAL; INFILTRATION; INTRACAUDAL; PERINEURAL at 15:31

## 2022-09-21 NOTE — PROGRESS NOTES
Aurora Goldterrance is here today for worsening Bilateral knee pain. Knee injection was discussed with the patient in detail, including indication, risks, benefits, and alternatives. Verbal consent was given for the procedure. Injection site was aseptically prepared.    KNEE Injection Procedure Note:    Large Joint Arthrocentesis: R knee  Date/Time: 9/21/2022 3:31 PM  Consent given by: patient  Site marked: site marked  Timeout: Immediately prior to procedure a time out was called to verify the correct patient, procedure, equipment, support staff and site/side marked as required   Supporting Documentation  Indications: pain, joint swelling and diagnostic evaluation   Procedure Details  Location: knee - R knee  Preparation: Patient was prepped and draped in the usual sterile fashion  Needle gauge: 21G.  Medications administered: 80 mg methylPREDNISolone acetate 80 MG/ML; 2 mL lidocaine PF 1% 1 %  Patient tolerance: patient tolerated the procedure well with no immediate complications    Large Joint Arthrocentesis: L knee  Date/Time: 9/21/2022 3:31 PM  Consent given by: patient  Site marked: site marked  Timeout: Immediately prior to procedure a time out was called to verify the correct patient, procedure, equipment, support staff and site/side marked as required   Supporting Documentation  Indications: pain   Procedure Details  Location: knee - L knee  Preparation: Patient was prepped and draped in the usual sterile fashion  Needle gauge: 21G.  Medications administered: 80 mg methylPREDNISolone acetate 80 MG/ML; 2 mL lidocaine PF 1% 1 %  Patient tolerance: patient tolerated the procedure well with no immediate complications        Aurora Bull tolerated the procedure well. A Bandage was applied to the injection site. At the conclusion of the injection I discussed the importance of continued quad strengthening exercises on a daily basis. I will see the patient back if the symptoms should fail to improve or  worsen.    Radha Cancino, APRN  9/21/2022      Much of this encounter note is an electronic transcription/translation of spoken language to printed text. The electronic translation of spoken language may permit erroneous, or at times, nonsensical words or phrases to be inadvertently transcribed; Although I have reviewed the note for such errors, some may still exist

## 2022-09-22 DIAGNOSIS — F40.240 CLAUSTROPHOBIA: Primary | ICD-10-CM

## 2022-09-22 RX ORDER — ALPRAZOLAM 0.5 MG/1
0.5 TABLET ORAL
Qty: 1 TABLET | Refills: 0 | Status: SHIPPED | OUTPATIENT
Start: 2022-09-22 | End: 2022-09-22

## 2022-09-24 ENCOUNTER — APPOINTMENT (OUTPATIENT)
Dept: MRI IMAGING | Facility: HOSPITAL | Age: 47
End: 2022-09-24

## 2022-09-27 DIAGNOSIS — M25.551 RIGHT HIP PAIN: ICD-10-CM

## 2022-11-09 DIAGNOSIS — G89.21 CHRONIC PAIN DUE TO INJURY: ICD-10-CM

## 2022-11-09 DIAGNOSIS — M17.0 OSTEOARTHRITIS OF BOTH KNEES, UNSPECIFIED OSTEOARTHRITIS TYPE: ICD-10-CM

## 2022-11-09 RX ORDER — HYDROCODONE BITARTRATE AND ACETAMINOPHEN 5; 325 MG/1; MG/1
1 TABLET ORAL EVERY 8 HOURS PRN
Qty: 7 TABLET | Refills: 0 | Status: SHIPPED | OUTPATIENT
Start: 2022-11-09 | End: 2022-12-15 | Stop reason: SDUPTHER

## 2022-11-09 NOTE — TELEPHONE ENCOUNTER
Rx Refill Note  Requested Prescriptions     Pending Prescriptions Disp Refills   • HYDROcodone-acetaminophen (NORCO) 5-325 MG per tablet 7 tablet 0     Sig: Take 1 tablet by mouth Every 8 (Eight) Hours As Needed for Severe Pain.      Last office visit with prescribing clinician: Visit date not found      Next office visit with prescribing clinician: Visit date not found            Shar Cruz MA  11/09/22, 11:07 EST

## 2022-11-28 NOTE — PROGRESS NOTES
Chief Complaint  Hip Pain (3M fu R hip pain - improved significantly. Completed medrol pack . )    Masks/face shield/appropriate PPE were worn for the entirety of the visit by the patient, MA, and provider.     Subjective        Aurora Bull presents to [facility]  History of Present Illness  Aurora Bull is a 47 y.o. female who presents to the clinic today to follow-up on hip pain.  Patient was last evaluated in the office 3 months ago.  Patient was treated with Medrol Dosepak and right hip MRI was performed.  MRI did not reveal hip abnormalities.  Patient states that her pain has improved.  She has been following with orthopedic surgery for chronic bilateral knee pain and received knee injections in September.  Patient was prescribed hydrocodone-acetaminophen for chronic knee pain 3 months ago.  Patient rarely takes this, only when she is standing for long periods of time and her pain is severe.  Patient was prescribed 7 pills which lasted her over a month.  Please see note in August for discussion of this medication with a psychiatric nurse practitioner at patient's psychiatrist's office.  She states the medication does help.  Patient is also concerned over some short-term and long-term memory problems that have been going on for for a while, but have seemed to worsen.  She states her grandmother was diagnosed with Alzheimer's in her 60s.  Patient is also wondering if her psychiatric medications could be playing a factor.  She denies weakness, slurred speech, facial drooping, or vision changes.      Review of Systems   Constitutional: Negative for chills, fatigue and fever.   Musculoskeletal: Positive for arthralgias.   Neurological: Negative for tremors, facial asymmetry, speech difficulty and headaches.   Psychiatric/Behavioral: Negative for confusion. The patient is nervous/anxious.        Objective   Vital Signs:  /86   Pulse 74   Temp 97 °F (36.1 °C)   Ht 167.6  "cm (66\")   Wt 117 kg (258 lb)   SpO2 98%   BMI 41.64 kg/m²   Estimated body mass index is 41.64 kg/m² as calculated from the following:    Height as of this encounter: 167.6 cm (66\").    Weight as of this encounter: 117 kg (258 lb).          Physical Exam  Vitals reviewed.   Constitutional:       General: She is not in acute distress.     Appearance: Normal appearance. She is not ill-appearing, toxic-appearing or diaphoretic.   HENT:      Head: Normocephalic and atraumatic.   Eyes:      General: No scleral icterus.        Right eye: No discharge.         Left eye: No discharge.      Extraocular Movements: Extraocular movements intact.      Pupils: Pupils are equal, round, and reactive to light.   Pulmonary:      Effort: Pulmonary effort is normal. No respiratory distress.   Neurological:      General: No focal deficit present.      Mental Status: She is alert and oriented to person, place, and time.      Cranial Nerves: No cranial nerve deficit, dysarthria or facial asymmetry.      Motor: No weakness or tremor.      Gait: Gait is intact. Gait normal.   Psychiatric:         Mood and Affect: Mood normal.         Speech: Speech normal.         Behavior: Behavior normal.        Result Review :    MRI Hip Right Without Contrast (09/24/2022)              Assessment and Plan   Diagnoses and all orders for this visit:    1. Right hip pain (Primary)    2. Osteoarthritis of both knees, unspecified osteoarthritis type    3. Memory difficulties      Patient's pain appears to be well controlled and right hip pain has improved.  Urine drug screen up-to-date.  Patient will meet with psychiatric nurse practitioner next week and will discuss  pain medication at that appointment.  She will also discuss her memory difficulties in the event her psychiatric medications need to be adjusted.  Patient will follow-up with me for further evaluation and slums testing in 3-4 weeks.  No acute neurologic deficits noted on physical examination " today.         Follow Up   Return in about 4 weeks (around 12/28/2022) for Recheck- memory evaluation.  Patient was given instructions and counseling regarding her condition or for health maintenance advice. Please see specific information pulled into the AVS if appropriate.     Electronically signed by MJ Valadez, 11/30/22, 5:06 PM EST.

## 2022-11-30 ENCOUNTER — OFFICE VISIT (OUTPATIENT)
Dept: FAMILY MEDICINE CLINIC | Facility: CLINIC | Age: 47
End: 2022-11-30

## 2022-11-30 VITALS
BODY MASS INDEX: 41.46 KG/M2 | SYSTOLIC BLOOD PRESSURE: 128 MMHG | HEIGHT: 66 IN | HEART RATE: 74 BPM | WEIGHT: 258 LBS | OXYGEN SATURATION: 98 % | DIASTOLIC BLOOD PRESSURE: 86 MMHG | TEMPERATURE: 97 F

## 2022-11-30 DIAGNOSIS — R41.3 MEMORY DIFFICULTIES: ICD-10-CM

## 2022-11-30 DIAGNOSIS — M17.0 OSTEOARTHRITIS OF BOTH KNEES, UNSPECIFIED OSTEOARTHRITIS TYPE: ICD-10-CM

## 2022-11-30 DIAGNOSIS — M25.551 RIGHT HIP PAIN: Primary | ICD-10-CM

## 2022-11-30 PROCEDURE — 99213 OFFICE O/P EST LOW 20 MIN: CPT | Performed by: NURSE PRACTITIONER

## 2022-12-01 ENCOUNTER — TELEPHONE (OUTPATIENT)
Dept: FAMILY MEDICINE CLINIC | Facility: CLINIC | Age: 47
End: 2022-12-01

## 2022-12-01 ENCOUNTER — APPOINTMENT (OUTPATIENT)
Dept: GENERAL RADIOLOGY | Facility: HOSPITAL | Age: 47
End: 2022-12-01

## 2022-12-01 ENCOUNTER — HOSPITAL ENCOUNTER (EMERGENCY)
Facility: HOSPITAL | Age: 47
Discharge: HOME | End: 2022-12-01
Attending: EMERGENCY MEDICINE | Admitting: EMERGENCY MEDICINE

## 2022-12-01 VITALS
OXYGEN SATURATION: 94 % | SYSTOLIC BLOOD PRESSURE: 133 MMHG | RESPIRATION RATE: 18 BRPM | BODY MASS INDEX: 41.46 KG/M2 | DIASTOLIC BLOOD PRESSURE: 94 MMHG | HEIGHT: 66 IN | WEIGHT: 258 LBS | HEART RATE: 71 BPM | TEMPERATURE: 99.2 F

## 2022-12-01 DIAGNOSIS — R07.89 ATYPICAL CHEST PAIN: Primary | ICD-10-CM

## 2022-12-01 DIAGNOSIS — R07.89 CHEST WALL PAIN: ICD-10-CM

## 2022-12-01 LAB
ALBUMIN SERPL-MCNC: 4.3 G/DL (ref 3.5–5.2)
ALBUMIN/GLOB SERPL: 1.4 G/DL
ALP SERPL-CCNC: 71 U/L (ref 39–117)
ALT SERPL W P-5'-P-CCNC: 15 U/L (ref 1–33)
ANION GAP SERPL CALCULATED.3IONS-SCNC: 10.4 MMOL/L (ref 5–15)
AST SERPL-CCNC: 20 U/L (ref 1–32)
BASOPHILS # BLD AUTO: 0.02 10*3/MM3 (ref 0–0.2)
BASOPHILS NFR BLD AUTO: 0.3 % (ref 0–1.5)
BILIRUB SERPL-MCNC: 0.7 MG/DL (ref 0–1.2)
BUN SERPL-MCNC: 14 MG/DL (ref 6–20)
BUN/CREAT SERPL: 14.7 (ref 7–25)
CALCIUM SPEC-SCNC: 9 MG/DL (ref 8.6–10.5)
CHLORIDE SERPL-SCNC: 102 MMOL/L (ref 98–107)
CO2 SERPL-SCNC: 27.6 MMOL/L (ref 22–29)
CREAT SERPL-MCNC: 0.95 MG/DL (ref 0.57–1)
DEPRECATED RDW RBC AUTO: 44.6 FL (ref 37–54)
EGFRCR SERPLBLD CKD-EPI 2021: 75 ML/MIN/1.73
EOSINOPHIL # BLD AUTO: 0.1 10*3/MM3 (ref 0–0.4)
EOSINOPHIL NFR BLD AUTO: 1.3 % (ref 0.3–6.2)
ERYTHROCYTE [DISTWIDTH] IN BLOOD BY AUTOMATED COUNT: 13.1 % (ref 12.3–15.4)
GLOBULIN UR ELPH-MCNC: 3 GM/DL
GLUCOSE SERPL-MCNC: 88 MG/DL (ref 65–99)
HCT VFR BLD AUTO: 42 % (ref 34–46.6)
HGB BLD-MCNC: 13.7 G/DL (ref 12–15.9)
IMM GRANULOCYTES # BLD AUTO: 0.04 10*3/MM3 (ref 0–0.05)
IMM GRANULOCYTES NFR BLD AUTO: 0.5 % (ref 0–0.5)
LIPASE SERPL-CCNC: 20 U/L (ref 13–60)
LYMPHOCYTES # BLD AUTO: 1.71 10*3/MM3 (ref 0.7–3.1)
LYMPHOCYTES NFR BLD AUTO: 21.6 % (ref 19.6–45.3)
MCH RBC QN AUTO: 29.9 PG (ref 26.6–33)
MCHC RBC AUTO-ENTMCNC: 32.6 G/DL (ref 31.5–35.7)
MCV RBC AUTO: 91.7 FL (ref 79–97)
MONOCYTES # BLD AUTO: 0.58 10*3/MM3 (ref 0.1–0.9)
MONOCYTES NFR BLD AUTO: 7.3 % (ref 5–12)
NEUTROPHILS NFR BLD AUTO: 5.47 10*3/MM3 (ref 1.7–7)
NEUTROPHILS NFR BLD AUTO: 69 % (ref 42.7–76)
NRBC BLD AUTO-RTO: 0 /100 WBC (ref 0–0.2)
PLATELET # BLD AUTO: 238 10*3/MM3 (ref 140–450)
PMV BLD AUTO: 10.4 FL (ref 6–12)
POTASSIUM SERPL-SCNC: 4.4 MMOL/L (ref 3.5–5.2)
PROT SERPL-MCNC: 7.3 G/DL (ref 6–8.5)
RBC # BLD AUTO: 4.58 10*6/MM3 (ref 3.77–5.28)
SODIUM SERPL-SCNC: 140 MMOL/L (ref 136–145)
TROPONIN T SERPL-MCNC: <0.01 NG/ML (ref 0–0.03)
WBC NRBC COR # BLD: 7.92 10*3/MM3 (ref 3.4–10.8)

## 2022-12-01 PROCEDURE — 85025 COMPLETE CBC W/AUTO DIFF WBC: CPT | Performed by: EMERGENCY MEDICINE

## 2022-12-01 PROCEDURE — 96374 THER/PROPH/DIAG INJ IV PUSH: CPT

## 2022-12-01 PROCEDURE — 80053 COMPREHEN METABOLIC PANEL: CPT | Performed by: EMERGENCY MEDICINE

## 2022-12-01 PROCEDURE — 71046 X-RAY EXAM CHEST 2 VIEWS: CPT

## 2022-12-01 PROCEDURE — 99283 EMERGENCY DEPT VISIT LOW MDM: CPT

## 2022-12-01 PROCEDURE — 25010000002 KETOROLAC TROMETHAMINE PER 15 MG: Performed by: EMERGENCY MEDICINE

## 2022-12-01 PROCEDURE — 84484 ASSAY OF TROPONIN QUANT: CPT | Performed by: EMERGENCY MEDICINE

## 2022-12-01 PROCEDURE — 83690 ASSAY OF LIPASE: CPT | Performed by: EMERGENCY MEDICINE

## 2022-12-01 PROCEDURE — 93010 ELECTROCARDIOGRAM REPORT: CPT | Performed by: INTERNAL MEDICINE

## 2022-12-01 PROCEDURE — 93005 ELECTROCARDIOGRAM TRACING: CPT | Performed by: EMERGENCY MEDICINE

## 2022-12-01 PROCEDURE — 99284 EMERGENCY DEPT VISIT MOD MDM: CPT

## 2022-12-01 RX ORDER — SODIUM CHLORIDE 0.9 % (FLUSH) 0.9 %
10 SYRINGE (ML) INJECTION AS NEEDED
Status: DISCONTINUED | OUTPATIENT
Start: 2022-12-01 | End: 2022-12-02 | Stop reason: HOSPADM

## 2022-12-01 RX ORDER — NAPROXEN 500 MG/1
500 TABLET ORAL 2 TIMES DAILY PRN
Qty: 20 TABLET | Refills: 0 | Status: SHIPPED | OUTPATIENT
Start: 2022-12-01 | End: 2023-01-30

## 2022-12-01 RX ORDER — METAXALONE 800 MG/1
800 TABLET ORAL 3 TIMES DAILY
Qty: 15 TABLET | Refills: 0 | Status: SHIPPED | OUTPATIENT
Start: 2022-12-01 | End: 2023-01-04

## 2022-12-01 RX ORDER — KETOROLAC TROMETHAMINE 15 MG/ML
15 INJECTION, SOLUTION INTRAMUSCULAR; INTRAVENOUS ONCE
Status: COMPLETED | OUTPATIENT
Start: 2022-12-01 | End: 2022-12-01

## 2022-12-01 RX ADMIN — KETOROLAC TROMETHAMINE 15 MG: 15 INJECTION, SOLUTION INTRAMUSCULAR; INTRAVENOUS at 19:43

## 2022-12-01 NOTE — TELEPHONE ENCOUNTER
Caller: JOEL SUMNER    Relationship: Father    Best call back number: [phone]    What is the best time to reach you: ANYTIME, AFTERNOONS ARE BETTER    Who are you requesting to speak with (clinical staff, provider,  specific staff member): LAMAR    What was the call regarding: CELESTE WAS UNABLE TO REALLY HEAR ANYTHING WHEN HE SPOKE WITH LAMAR TODAY. HE WOULD LIKE TO SPEAK TO HER AGAIN TO GO OVER THE SAME INFORMATION     Do you require a callback: YES

## 2022-12-01 NOTE — ED TRIAGE NOTES
Pt has pain under bilat breasts.  It started today.  She vomited     Patient was placed in face mask during first look triage.  Patient was wearing a face mask throughout encounter.  I wore personal protective equipment throughout the encounter.  Hand hygiene was performed before and after patient encounter.

## 2022-12-01 NOTE — TELEPHONE ENCOUNTER
----- Message from MJ Dent sent at 12/1/2022 12:26 PM EST -----  Regarding: Follow-up  Please let patient know I reviewed her past records.  I did not see CT scan results on her past records, but I did see a ultrasound of her right upper quadrant.  There was presence of kidney stones, but patient had a kidney ultrasound a year ago that was normal.

## 2022-12-02 LAB — QT INTERVAL: 399 MS

## 2022-12-02 NOTE — ED NOTES
"Pt reports rib pain x 1 day. Pt states \"it started on my right side and then an hour later migrated to my left side.\" Pt reports no activity at onset of pain, reports nothing makes it worse or better. Pt reports pain radiates down her back and to abdomen. Pt reports new onset nausea and vomiting since pain started, and chronic diarrhea. Pt has hx pf gallbladder removal and gastric sleeve.   "

## 2022-12-02 NOTE — DISCHARGE INSTRUCTIONS
Take medications as prescribed.  Follow-up with your primary care provider if symptoms persist.  Return to the emergency department for worsening pain, shortness of breath, fever, or other concern.

## 2022-12-02 NOTE — ED PROVIDER NOTES
EMERGENCY DEPARTMENT ENCOUNTER    Room Number:  25/25  Date of encounter:  12/2/2022  PCP: Sonia Christiansen APRN  Historian: Patient     I used full protective equipment while examining this patient.  This includes face mask, gloves and protective eyewear.  I washed my hands before entering the room and immediately upon leaving the room.  Patient was wearing a surgical mask.      HPI:  Chief Complaint: Chest pain  A complete HPI/ROS/PMH/PSH/SH/FH are unobtainable due to: None    Context: Aurora Bull is a 47 y.o. female who presents to the ED by private vehicle from home c/o chest pain.  Patient began having pain under her right breast earlier today.  About an hour later, she began to have pain under her left breast.  Pain was constant for several hours but has been intermittent since then.  Pain is sharp.  Nothing makes it better or worse.  Pain is moderate in intensity.  Pain does not radiate into the neck, jaw, or arms.  Denies recent injury.  She had several episodes of nonbilious nonbloody vomiting.  After vomiting, she reports having some abdominal cramping.  Denies fever, chills, sweating, shortness of breath, palpitations, dizziness, fever, cough, dysuria, flank pain, leg swelling, or leg pain.  Denies history of heart disease.  She has had a gastric sleeve and cholecystectomy.      PAST MEDICAL HISTORY  Active Ambulatory Problems     Diagnosis Date Noted   • Schizoaffective disorder (HCC) 03/14/2013   • Mild intermittent asthma without complication 11/03/2021   • Vitamin D deficiency 11/21/2013   • Preinfarction syndrome (HCC) 03/02/2017   • Moderate persistent asthma 04/03/2017   • Iron deficiency anemia 04/03/2017   • Generalized anxiety disorder 03/14/2013   • Gastroesophageal reflux disease 04/03/2017   • Family history of coronary arteriosclerosis 03/31/2017   • Ex-smoker 03/31/2017   • Blood in urine 04/04/2017   • Knee pain 10/09/2009   • Glaucoma suspect of both eyes 04/29/2015   • Body mass  index 40.0-44.9, adult ([facility]) 2009   • Chronic diarrhea 2021   • Osteoarthritis of both knees 2022   • Chest pain 2022   • Nausea and vomiting 2022   • Diarrhea 2022     Resolved Ambulatory Problems     Diagnosis Date Noted   • No Resolved Ambulatory Problems     Past Medical History:   Diagnosis Date   • Asthma    • Chronic knee pain    • Chronic lower back pain    • Depression    • Dislocated elbow    • Kidney stones    • Knee swelling    • Low back strain    • Neck strain    • Osgood-Schlatter's disease    • PTSD (post-traumatic stress disorder)          PAST SURGICAL HISTORY  Past Surgical History:   Procedure Laterality Date   • ADENOIDECTOMY     • CARDIAC CATHETERIZATION     •  SECTION     • CHOLECYSTECTOMY     • COLONOSCOPY     • COLONOSCOPY N/A 2022    Procedure: COLONOSCOPY, BIOPSIES, POLYPECTOMY;  Surgeon: Jerald Keller MD;  Location: [facility] MAIN OR;  Service: Gastroenterology;  Laterality: N/A;  POLYP X 1   • ENDOSCOPY     • ENDOSCOPY N/A 2022    Procedure: ESOPHAGOGASTRODUODENOSCOPY WITH BIOPSIES;  Surgeon: Jerald Keller MD;  Location: [facility] MAIN [facility];  Service: Gastroenterology;  Laterality: N/A;  ESOPHAGITIS, HIATAL HERNIA     • GASTRIC SLEEVE LAPAROSCOPIC     • HYSTERECTOMY     • KNEE CARTILAGE SURGERY Right 2015   • KNEE SURGERY Right 2016    scope   • NERVE BLOCK Bilateral 3/21/2022    Procedure: KNEE GENICULAR NERVE BLOCK UNDER FLUORO bilateral;  Surgeon: Emily Smith MD;  Location: [facility] MAIN OR;  Service: Pain Management;  Laterality: Bilateral;   • TONSILLECTOMY     • UPPER GASTROINTESTINAL ENDOSCOPY           FAMILY HISTORY  Family History   Problem Relation Age of Onset   • Diabetes Mother    • Diabetes Father    • Hyperlipidemia Father    • Heart disease Father    • Hypertension Father    • Hyperlipidemia Brother    • Diabetes Brother    • Heart block Brother    •  Scoliosis Son    • Diabetes Maternal Aunt    • Dwarfism Maternal Uncle    • Hypertension Paternal Uncle    • Mental illness Maternal Grandmother    • Diabetes Maternal Grandmother    • Memory loss Maternal Grandmother    • Esophageal cancer Maternal Grandfather    • Alzheimer's disease Paternal Grandmother    • Hypertension Paternal Grandmother    • Hypertension Paternal Grandfather    • Heart murmur Paternal Grandfather    • Seasonal affective disorder Brother    • Drug abuse Brother    • Mental illness Brother    • Alcohol abuse Brother    • Colon cancer Neg Hx    • Colon polyps Neg Hx    • Crohn's disease Neg Hx    • Irritable bowel syndrome Neg Hx    • Ulcerative colitis Neg Hx          SOCIAL HISTORY  Social History     Socioeconomic History   • Marital status:    Tobacco Use   • Smoking status: Former     Packs/day: 1.50     Years: 24.00     Pack years: 36.00     Types: Cigarettes   • Smokeless tobacco: Never   • Tobacco comments:     Quit in 2014   Vaping Use   • Vaping Use: Never used   Substance and Sexual Activity   • Alcohol use: Yes     Alcohol/week: 2.0 standard drinks     Types: 2 Shots of liquor per week     Comment: 2 shots nights   • Drug use: Not Currently   • Sexual activity: Yes         ALLERGIES  Patient has no known allergies.       REVIEW OF SYSTEMS  Review of Systems      All systems have been reviewed and are negative except as as discussed in the HPI    PHYSICAL EXAM    I have reviewed the triage vital signs and nursing notes.    ED Triage Vitals   Temp Heart Rate Resp BP SpO2   12/01/22 1604 12/01/22 1604 12/01/22 1604 12/01/22 1920 12/01/22 1604   99.2 °F (37.3 °C) (!) 122 18 145/98 99 %      Temp src Heart Rate Source Patient Position BP Location FiO2 (%)   12/01/22 1604 12/01/22 1604 12/01/22 1920 12/01/22 1920 --   Tympanic Monitor Lying Right arm        Physical Exam  GENERAL: Awake, alert, oriented x3.  Well-developed, well-nourished female.  Resting comfortably in no acute  distress  HENT: NCAT, nares patent, moist mucous membranes  NECK: supple  EYES: Extraocular muscles intact, no scleral icterus  CV: regular rhythm, regular rate, equal radial pulses bilaterally  RESPIRATORY: normal effort, clear to auscultation bilaterally  ABDOMEN: soft, obese, nontender  MUSCULOSKELETAL: There is tenderness over the lower anterior chest wall bilaterally.  No crepitus.  Extremities are nontender and without obvious deformity.  There is no calf tenderness or pedal edema  NEURO: Speech is normal.  No facial droop.  Follows commands.  SKIN: warm, dry, no rash  PSYCH: Normal mood and affect      LAB RESULTS  Recent Results (from the past 24 hour(s))   ECG 12 Lead Chest Pain    Collection Time: 12/01/22  7:37 PM   Result Value Ref Range    QT Interval 399 ms   Comprehensive Metabolic Panel    Collection Time: 12/01/22  7:42 PM    Specimen: Blood   Result Value Ref Range    Glucose 88 65 - 99 mg/dL    BUN 14 6 - 20 mg/dL    Creatinine 0.95 0.57 - 1.00 mg/dL    Sodium 140 136 - 145 mmol/L    Potassium 4.4 3.5 - 5.2 mmol/L    Chloride 102 98 - 107 mmol/L    CO2 27.6 22.0 - 29.0 mmol/L    Calcium 9.0 8.6 - 10.5 mg/dL    Total Protein 7.3 6.0 - 8.5 g/dL    Albumin 4.30 3.50 - 5.20 g/dL    ALT (SGPT) 15 1 - 33 U/L    AST (SGOT) 20 1 - 32 U/L    Alkaline Phosphatase 71 39 - 117 U/L    Total Bilirubin 0.7 0.0 - 1.2 mg/dL    Globulin 3.0 gm/dL    A/G Ratio 1.4 g/dL    BUN/Creatinine Ratio 14.7 7.0 - 25.0    Anion Gap 10.4 5.0 - 15.0 mmol/L    eGFR 75.0 >60.0 mL/min/1.73   Lipase    Collection Time: 12/01/22  7:42 PM    Specimen: Blood   Result Value Ref Range    Lipase 20 13 - 60 U/L   Troponin    Collection Time: 12/01/22  7:42 PM    Specimen: Blood   Result Value Ref Range    Troponin T <0.010 0.000 - 0.030 ng/mL   CBC Auto Differential    Collection Time: 12/01/22  7:42 PM    Specimen: Blood   Result Value Ref Range    WBC 7.92 3.40 - 10.80 10*3/mm3    RBC 4.58 3.77 - 5.28 10*6/mm3    Hemoglobin 13.7 12.0 -  15.9 g/dL    Hematocrit 42.0 34.0 - 46.6 %    MCV 91.7 79.0 - 97.0 fL    MCH 29.9 26.6 - 33.0 pg    MCHC 32.6 31.5 - 35.7 g/dL    RDW 13.1 12.3 - 15.4 %    RDW-SD 44.6 37.0 - 54.0 fl    MPV 10.4 6.0 - 12.0 fL    Platelets 238 140 - 450 10*3/mm3    Neutrophil % 69.0 42.7 - 76.0 %    Lymphocyte % 21.6 19.6 - 45.3 %    Monocyte % 7.3 5.0 - 12.0 %    Eosinophil % 1.3 0.3 - 6.2 %    Basophil % 0.3 0.0 - 1.5 %    Immature Grans % 0.5 0.0 - 0.5 %    Neutrophils, Absolute 5.47 1.70 - 7.00 10*3/mm3    Lymphocytes, Absolute 1.71 0.70 - 3.10 10*3/mm3    Monocytes, Absolute 0.58 0.10 - 0.90 10*3/mm3    Eosinophils, Absolute 0.10 0.00 - 0.40 10*3/mm3    Basophils, Absolute 0.02 0.00 - 0.20 10*3/mm3    Immature Grans, Absolute 0.04 0.00 - 0.05 10*3/mm3    nRBC 0.0 0.0 - 0.2 /100 WBC       Ordered the above labs and independently reviewed the results.      RADIOLOGY  XR Chest 2 View    Result Date: 12/1/2022  XR CHEST 2 VW-  HISTORY: Female who is 47 years-old,  chest pain  TECHNIQUE: Frontal and lateral views of the chest  COMPARISON: None available  FINDINGS: The heart size is borderline. Pulmonary vasculature is unremarkable. No focal pulmonary consolidation, pleural effusion, or pneumothorax. No acute osseous process.      No focal pulmonary consolidation. Borderline heart size. Follow-up as clinical indications persist.  This report was finalized on 12/1/2022 7:53 PM by Dr. Ari Birmingham M.D.        I ordered the above noted radiological studies. Reviewed by me and discussed with radiologist.  See dictation for official radiology interpretation.      PROCEDURES  Procedures      MEDICATIONS GIVEN IN ER    Medications   ketorolac (TORADOL) injection 15 mg (15 mg Intravenous Given 12/1/22 1943)         PROGRESS, DATA ANALYSIS, CONSULTS, AND MEDICAL DECISION MAKING    All labs have been independently reviewed by me.  All radiology studies have been reviewed by me and discussed with radiologist dictating the report.   EKG's  independently viewed and interpreted by me.  I have reviewed the nurse's notes, vital signs, past medical history, and medication list.  Discussion below represents my analysis of pertinent findings related to patient's condition, differential diagnosis, treatment plan and final disposition.      ED Course as of 12/02/22 0130   Thu Dec 01, 2022   1923 Old records reviewed.  Patient had upper and lower endoscopy performed in June 2022.  Upper endoscopy showed a small hiatal hernia and mild esophagitis.  Stomach and duodenum were normal.  Colonoscopy showed a single polyp in the cecum which was removed.  Remainder of the study was unremarkable.  Patient had a stress test done in March 2022.  Impressions were consistent with a low risk study. [WH]   1933 Patient presents to the ED complaining of sharp pain in her lower anterior chest wall.  She is PERC negative.  Chest pain is atypical and reproducible with palpation.  Will obtain labs, EKG, and chest x-ray for further evaluation.  Patient will be given IV Toradol.    DDx includes but is not limited to acute coronary syndrome, pulmonary embolism, thoracic aortic dissection, pneumonia, pneumothorax, musculoskeletal pain, GERD or esophageal spasm, anxiety, myocarditis/pericarditis, esophageal rupture, pancreatitis.    [WH]   1949 EKG          EKG time: 1937  Rhythm/Rate: Sinus rhythm, rate 84  P waves and WA: Normal  QRS, axis: Normal  ST and T waves: Normal    Interpreted Contemporaneously by me at 1942, independently viewed  No prior available for comparison   [WH]   1959 Chest x-ray interpreted by the radiologist.  Chest x-ray independently viewed and interpreted by me.  There are no acute findings. [WH]   2027 Troponin T: <0.010 [WH]   2027 WBC: 7.92 [WH]   2146 Test results discussed with the patient.  She is resting comfortably.  Her pain is improved with IV Toradol.  Vital signs are normal.  Chest pain is atypical and reproducible.  Patient will be discharged with  prescriptions for Naprosyn and Skelaxin.  Return precautions were discussed [WH]      ED Course User Index  [WH] Edilson Borrero MD       AS OF 01:30 EST VITALS:    BP - 133/94  HR - 71  TEMP - 99.2 °F (37.3 °C) (Tympanic)  O2 SATS - 94%      DIAGNOSIS  Final diagnoses:   Atypical chest pain   Chest wall pain         DISPOSITION  DISCHARGE    Patient discharged in stable condition.    Reviewed implications of results, diagnosis, meds, responsibility to follow up, warning signs and symptoms of possible worsening, potential complications and reasons to return to ER, including worsening or persistent pain, shortness of breath, nausea, vomiting, sweating, pain radiating to neck/jaw/arm, or other concern..    Patient/Family voiced understanding of above instructions.    Discussed plan for discharge, as there is no emergent indication for admission. Patient referred to primary care provider for BP management due to today's BP. Pt/family is agreeable and understands need for follow up and repeat testing.  Pt is aware that discharge does not mean that nothing is wrong but it indicates no emergency is present that requires admission and they must continue care with follow-up as given below or physician of their choice.     FOLLOW-UP  Sonia Christiansen, APRN  [address] [facility] [address] [phone]    Schedule an appointment as soon as possible for a visit   If symptoms persist         Medication List      New Prescriptions    metaxalone 800 MG tablet  Commonly known as: SKELAXIN  Take 1 tablet by mouth 3 (Three) Times a Day.     naproxen 500 MG EC tablet  Commonly known as: EC NAPROSYN  Take 1 tablet by mouth 2 (Two) Times a Day As Needed for Mild Pain.        Changed    prazosin 2 MG capsule  Commonly known as: MINIPRESS  Take 2 capsules by mouth Every Night. 1-4 tablets nightly  What changed:   · how much to take  · additional instructions           Where to Get Your Medications      These  medications were sent to [facility] [id number] - [place], [place] - [address] AT [facility]. - [phone]  - [phone]   [address], [facility] [address]    Phone: [phone]   · metaxalone 800 MG tablet  · naproxen 500 MG EC tablet           Dictated utilizing Dragon dictation     Edilson Borrero MD  12/02/22 [id number]

## 2022-12-15 DIAGNOSIS — M17.0 OSTEOARTHRITIS OF BOTH KNEES, UNSPECIFIED OSTEOARTHRITIS TYPE: ICD-10-CM

## 2022-12-15 DIAGNOSIS — G89.21 CHRONIC PAIN DUE TO INJURY: ICD-10-CM

## 2022-12-16 RX ORDER — HYDROCODONE BITARTRATE AND ACETAMINOPHEN 5; 325 MG/1; MG/1
1 TABLET ORAL EVERY 8 HOURS PRN
Qty: 7 TABLET | Refills: 0 | Status: SHIPPED | OUTPATIENT
Start: 2022-12-16 | End: 2023-01-30 | Stop reason: SDUPTHER

## 2022-12-16 NOTE — TELEPHONE ENCOUNTER
Rx Refill Note  Requested Prescriptions     Pending Prescriptions Disp Refills   • HYDROcodone-acetaminophen (NORCO) 5-325 MG per tablet 7 tablet 0     Sig: Take 1 tablet by mouth Every 8 (Eight) Hours As Needed for Severe Pain.      Last office visit with prescribing clinician: 11/30/2022   Last telemedicine visit with prescribing clinician: 1/4/2023   Next office visit with prescribing clinician: 1/4/2023                         Would you like a call back once the refill request has been completed: [] Yes [] No    If the office needs to give you a call back, can they leave a voicemail: [] Yes [] No    Shar Cruz MA  12/16/22, 07:47 EST

## 2022-12-21 ENCOUNTER — CLINICAL SUPPORT (OUTPATIENT)
Dept: ORTHOPEDIC SURGERY | Facility: CLINIC | Age: 47
End: 2022-12-21

## 2022-12-21 VITALS — WEIGHT: 255 LBS | BODY MASS INDEX: 40.98 KG/M2 | TEMPERATURE: 96.2 F | HEIGHT: 66 IN

## 2022-12-21 DIAGNOSIS — M17.0 PRIMARY OSTEOARTHRITIS OF BOTH KNEES: ICD-10-CM

## 2022-12-21 DIAGNOSIS — R52 PAIN: Primary | ICD-10-CM

## 2022-12-21 PROCEDURE — 73562 X-RAY EXAM OF KNEE 3: CPT | Performed by: NURSE PRACTITIONER

## 2022-12-21 PROCEDURE — 20610 DRAIN/INJ JOINT/BURSA W/O US: CPT | Performed by: NURSE PRACTITIONER

## 2022-12-21 RX ADMIN — METHYLPREDNISOLONE ACETATE 80 MG: 80 INJECTION, SUSPENSION INTRA-ARTICULAR; INTRALESIONAL; INTRAMUSCULAR; SOFT TISSUE at 11:46

## 2022-12-21 RX ADMIN — METHYLPREDNISOLONE ACETATE 80 MG: 80 INJECTION, SUSPENSION INTRA-ARTICULAR; INTRALESIONAL; INTRAMUSCULAR; SOFT TISSUE at 13:51

## 2022-12-21 NOTE — PROGRESS NOTES
Patient: Aurora Bull  YOB: 1975  Date of Service: 12/22/2022    Chief Complaints:   Chief Complaint   Patient presents with   • Left Knee - Pain, Follow-up   • Right Knee - Pain, Follow-up       Subjective: Here for cortisone to both knees desires conservative treatment    History of Present Illness: Pt is seen in the office today with complaints of   Chief Complaint   Patient presents with   • Left Knee - Pain, Follow-up   • Right Knee - Pain, Follow-up   .  KNEE: TIMING:  The pain is described as ACUTE.  LOCATION: medial joint line tenderness. AGGRAVATING FACTORS:  Is worsened by prolonged standing, sitting, walking and squatting activities.  ASSOCIATED SYMPTOMS:  swelling, tenderness, and aching.    This problem is not new to this examiner.     Allergies: No Known Allergies    Medications:   Home Medications:  Current Outpatient Medications on File Prior to Visit   Medication Sig   • clonazePAM (KlonoPIN) 1 MG tablet Take 1 tablet by mouth 2 (Two) Times a Day.   • cyclobenzaprine (FLEXERIL) 10 MG tablet Take 1 tablet by mouth 3 (Three) Times a Day As Needed for Muscle Spasms.   • dicyclomine (BENTYL) 20 MG tablet Take 1 tablet by mouth 4 (Four) Times a Day As Needed (diarrhea).   • diphenoxylate-atropine (LOMOTIL) 2.5-0.025 MG per tablet Take 1 tablet by mouth 4 (Four) Times a Day As Needed for Diarrhea.   • fluvoxaMINE (LUVOX) 100 MG tablet Take 1 tablet by mouth 2 (Two) Times a Day.   • HYDROcodone-acetaminophen (NORCO) 5-325 MG per tablet Take 1 tablet by mouth Every 8 (Eight) Hours As Needed for Severe Pain.   • ipratropium-albuterol (COMBIVENT RESPIMAT)  MCG/ACT inhaler Inhale 1 puff 4 (Four) Times a Day As Needed for Wheezing.   • lamoTRIgine (LaMICtal) 150 MG tablet Take 1 tablet by mouth Daily.   • metaxalone (SKELAXIN) 800 MG tablet Take 1 tablet by mouth 3 (Three) Times a Day.   • OLANZapine zydis (zyPREXA) 5 MG disintegrating tablet Place 1 tablet on the tongue Every Night.  dissolve one tablet by mouth for increase psychosis may repeat in 30 minutes if needed. Limit 2 tablets a day   • pantoprazole (Protonix) 40 MG EC tablet Take 1 tablet by mouth Daily.   • prazosin (MINIPRESS) 2 MG capsule Take 2 capsules by mouth Every Night. 1-4 tablets nightly (Patient taking differently: Take 5 mg by mouth Every Night.)   • ziprasidone (GEODON) 80 MG capsule Take 1 capsule by mouth 2 (Two) Times a Day.   • naproxen (EC NAPROSYN) 500 MG EC tablet Take 1 tablet by mouth 2 (Two) Times a Day As Needed for Mild Pain.     No current facility-administered medications on file prior to visit.     Current Medications:  Scheduled Meds:  Continuous Infusions:No current facility-administered medications for this visit.    PRN Meds:.    I have reviewed the patient's medical history in detail and updated the computerized patient record.  Review and summarization of old records include:    Past Medical History:   Diagnosis Date   • Asthma    • Chronic diarrhea    • Chronic knee pain     bilateral   • Chronic lower back pain    • Depression    • Dislocated elbow    • Kidney stones    • Knee swelling    • Low back strain    • Neck strain    • Osgood-Schlatter's disease    • PTSD (post-traumatic stress disorder)    • Schizoaffective disorder (HCC)         Past Surgical History:   Procedure Laterality Date   • ADENOIDECTOMY     • CARDIAC CATHETERIZATION     •  SECTION     • CHOLECYSTECTOMY     • COLONOSCOPY     • COLONOSCOPY N/A 2022    Procedure: COLONOSCOPY, BIOPSIES, POLYPECTOMY;  Surgeon: Jerald Keller MD;  [place] [facility] [place]  Service: Gastroenterology;  Laterality: N/A;  POLYP X 1   • ENDOSCOPY     • ENDOSCOPY N/A 2022    Procedure: ESOPHAGOGASTRODUODENOSCOPY WITH BIOPSIES;  Surgeon: Jerald Keller MD;  [place] [facility] [place]  Service: Gastroenterology;  Laterality: N/A;  ESOPHAGITIS, HIATAL HERNIA     • GASTRIC SLEEVE  LAPAROSCOPIC  2014   • HYSTERECTOMY  2019   • KNEE CARTILAGE SURGERY Right 2015   • KNEE SURGERY Right 2016    scope   • NERVE BLOCK Bilateral 3/21/2022    Procedure: KNEE GENICULAR NERVE BLOCK UNDER FLUORO bilateral;  Surgeon: Emily Smith MD;  [place] [facility] [place];  Service: Pain Management;  Laterality: Bilateral;   • TONSILLECTOMY  1988   • UPPER GASTROINTESTINAL ENDOSCOPY          Social History     Occupational History   • Not on file   Tobacco Use   • Smoking status: Former     Packs/day: 1.50     Years: 24.00     Pack years: 36.00     Types: Cigarettes   • Smokeless tobacco: Never   • Tobacco comments:     Quit in 2014   Vaping Use   • Vaping Use: Never used   Substance and Sexual Activity   • Alcohol use: Yes     Alcohol/week: 2.0 standard drinks     Types: 2 Shots of liquor per week     Comment: 2 shots nights   • Drug use: Not Currently   • Sexual activity: Yes      Social History     Social History Narrative   • Not on file        Family History   Problem Relation Age of Onset   • Diabetes Mother    • Diabetes Father    • Hyperlipidemia Father    • Heart disease Father    • Hypertension Father    • Hyperlipidemia Brother    • Diabetes Brother    • Heart block Brother    • Scoliosis Son    • Diabetes Maternal Aunt    • Dwarfism Maternal Uncle    • Hypertension Paternal Uncle    • Mental illness Maternal Grandmother    • Diabetes Maternal Grandmother    • Memory loss Maternal Grandmother    • Esophageal cancer Maternal Grandfather    • Alzheimer's disease Paternal Grandmother    • Hypertension Paternal Grandmother    • Hypertension Paternal Grandfather    • Heart murmur Paternal Grandfather    • Seasonal affective disorder Brother    • Drug abuse Brother    • Mental illness Brother    • Alcohol abuse Brother    • Colon cancer Neg Hx    • Colon polyps Neg Hx    • Crohn's disease Neg Hx    • Irritable bowel syndrome Neg Hx    • Ulcerative colitis Neg Hx        ROS: 14 point review of systems was  performed and was negative except for documented findings in HPI and today's encounter.     Allergies: No Known Allergies  Constitutional:  Denies fever, shaking or chills   Eyes:  Denies change in visual acuity   HENT:  Denies nasal congestion or sore throat   Respiratory:  Denies cough or shortness of breath   Cardiovascular:  Denies chest pain or severe LE edema   GI:  Denies abdominal pain, nausea, vomiting, bloody stools or diarrhea   Musculoskeletal:  Numbness, tingling, or loss of motor function only as noted above in history of present illness.  : Denies painful urination or hematuria  Integument:  Denies rash, lesion or ulceration   Neurologic:  Denies headache or focal weakness  Endocrine:  Denies lymphadenopathy  Psych:  Denies confusion or change in mental status   Hem:  Denies active bleeding      Physical Exam: 47 y.o. female  Wt Readings from Last 3 Encounters:   12/21/22 116 kg (255 lb)   12/01/22 117 kg (258 lb)   11/30/22 117 kg (258 lb)       Facility age limit for growth percentiles is 20 years.  Vitals:    12/21/22 1353   Temp: 96.2 °F (35.7 °C)     Vital signs reviewed.   General Appearance:    Alert, cooperative, in no acute distress                  Eyes: conjunctiva clear  ENT: external ears and nose atraumatic  CV: no peripheral edema  Resp: normal respiratory effort  Skin: no rashes or wounds; normal turgor  Psych: mood and affect appropriate  Lymph: no nodes appreciated  Neuro: gross sensation intact  Vascular:  Palpable peripheral pulse in noted extremity  Musculoskeletal Extremities: KNEE Exam: medial and lateral joint line tenderness with crepitation, synovitis, swelling, and joint effusion bilateral knee.      Diagnostic Data:  Imaging done today and discussed with the patient:    Indication: pain related symptoms,  Views: 3V AP, LAT & 40 degree PA bilateral knee(s)   Findings: advanced arthritis, moderate arthritis  Comparison views: viewed last xray done in the office.       Procedure:  Large Joint Arthrocentesis: R knee  Date/Time: 12/21/2022 11:46 AM  Consent given by: patient  Site marked: site marked  Timeout: Immediately prior to procedure a time out was called to verify the correct patient, procedure, equipment, support staff and site/side marked as required   Supporting Documentation  Indications: pain, joint swelling and diagnostic evaluation   Procedure Details  Location: knee - R knee  Preparation: Patient was prepped and draped in the usual sterile fashion  Needle gauge: 21G.  Medications administered: 80 mg methylPREDNISolone acetate 80 MG/ML; 2 mL lidocaine (cardiac)  Patient tolerance: patient tolerated the procedure well with no immediate complications    Large Joint Arthrocentesis: L knee  Date/Time: 12/21/2022 1:51 PM  Consent given by: patient  Site marked: site marked  Timeout: Immediately prior to procedure a time out was called to verify the correct patient, procedure, equipment, support staff and site/side marked as required   Supporting Documentation  Indications: pain   Procedure Details  Location: knee - L knee  Preparation: Patient was prepped and draped in the usual sterile fashion  Needle gauge: 21G.  Medications administered: 80 mg methylPREDNISolone acetate 80 MG/ML; 2 mL lidocaine (cardiac)  Patient tolerance: patient tolerated the procedure well with no immediate complications          Assessment:     ICD-10-CM ICD-9-CM   1. Pain  R52 780.96   2. Primary osteoarthritis of both knees  M17.0 715.16       Plan:   Follow up as indicated.  Ice, elevate, and rest as needed.  The diagnosis(es), natural history, pathophysiology and treatment for diagnosis(es) were discussed. Opportunity given and questions answered.  Biomechanics of pertinent body areas discussed.  When appropriate, the use of ambulatory aids discussed.  BMI:  The concept of BMI body mass index and its importance and implications discussed.    EXERCISES:  Advice on benefits of, and types of  regular/moderate exercise pertaining to orthopedic diagnosis(es).  MEDICATIONS:  The risks, benefits, warnings,side effects and alternatives of medications discussed.  DIABETES:  Diabetic counseling and how it affects the diagnosis and treatment  Inflammation/pain control; with cold, heat, elevation and/or liniments discussed as appropriate  Cortisone Injection. See procedure note.  MEDICAL RECORDS reviewed from other provider(s) for past and current medical history pertinent to this complaint.    12/22/2022

## 2022-12-22 RX ORDER — METHYLPREDNISOLONE ACETATE 80 MG/ML
80 INJECTION, SUSPENSION INTRA-ARTICULAR; INTRALESIONAL; INTRAMUSCULAR; SOFT TISSUE
Status: COMPLETED | OUTPATIENT
Start: 2022-12-21 | End: 2022-12-21

## 2023-01-03 NOTE — PROGRESS NOTES
Chief Complaint  Memory Loss (1M fu - pt reports no change)    Masks/face shield/appropriate PPE were worn for the entirety of the visit by the patient, MA, and provider.     Subjective        Aurora Bull presents to [facility]  History of Present Illness  Aurora Bull is a 47 y.o. female who presents to the clinic today with concerns of gradual memory loss.  Patient has difficulty remembering dates and remembering things like the number password to her phone.  Patient does not have difficulty remembering names of immediate family members.  She is fully aware and alert to who she is and where she is today.  Patient did not have an opportunity to talk with her psychiatrist regarding her memory loss and the possibility of her psychiatric medications contributing to her symptoms.  Patient does occasionally have headaches that require her to go into a dark room.  She denies changes in vision, slurred speech, or weakness.    Review of Systems   Psychiatric/Behavioral:        Memory loss       Objective   Vital Signs:  /86   Pulse 84   Temp 98 °F (36.7 °C)   Ht 167.6 cm (66\")   Wt 117 kg (259 lb)   SpO2 99%   BMI 41.80 kg/m²   Estimated body mass index is 41.8 kg/m² as calculated from the following:    Height as of this encounter: 167.6 cm (66\").    Weight as of this encounter: 117 kg (259 lb).          Physical Exam  Vitals reviewed.   Constitutional:       Appearance: Normal appearance.   HENT:      Head: Normocephalic and atraumatic.   Eyes:      General: No scleral icterus.        Right eye: No discharge.         Left eye: No discharge.   Pulmonary:      Effort: Pulmonary effort is normal. No respiratory distress.   Neurological:      General: No focal deficit present.      Mental Status: She is alert and oriented to person, place, and time.      Cranial Nerves: No cranial nerve deficit, dysarthria or facial asymmetry.      Motor: No weakness, tremor, atrophy, abnormal  muscle tone or seizure activity.      Coordination: Coordination is intact.      Gait: Gait normal.   Psychiatric:         Attention and Perception: Attention normal.         Mood and Affect: Mood normal.         Speech: Speech normal.         Behavior: Behavior normal.        Result Review :                Assessment and Plan   Diagnoses and all orders for this visit:    1. Cognitive impairment (Primary)  -     Ambulatory Referral to Neurology      SLUMS examination indicative of dementia level scoring.  Patient has been referred to neurology.  Patient does not appear to be acutely confused today and neurologic examination within normal limits.  I am concerned that patient's psychiatric medications could be contributing.  She will see her psychiatrist at the end of this month.  Patient will discuss this with her psychiatric nurse practitioner and patient advised for notes to be faxed to our office.       Follow Up   Return in about 3 months (around 4/4/2023), or if symptoms worsen or fail to improve, for Recheck- pain.  Patient was given instructions and counseling regarding her condition or for health maintenance advice. Please see specific information pulled into the AVS if appropriate.     Electronically signed by MJ Valadez, 01/04/23, 4:54 PM EST.

## 2023-01-04 ENCOUNTER — OFFICE VISIT (OUTPATIENT)
Dept: FAMILY MEDICINE CLINIC | Facility: CLINIC | Age: 48
End: 2023-01-04
Payer: MEDICARE

## 2023-01-04 VITALS
DIASTOLIC BLOOD PRESSURE: 86 MMHG | HEIGHT: 66 IN | BODY MASS INDEX: 41.62 KG/M2 | OXYGEN SATURATION: 99 % | WEIGHT: 259 LBS | SYSTOLIC BLOOD PRESSURE: 128 MMHG | TEMPERATURE: 98 F | HEART RATE: 84 BPM

## 2023-01-04 DIAGNOSIS — R41.89 COGNITIVE IMPAIRMENT: Primary | ICD-10-CM

## 2023-01-04 PROCEDURE — 99213 OFFICE O/P EST LOW 20 MIN: CPT | Performed by: NURSE PRACTITIONER

## 2023-01-11 ENCOUNTER — OFFICE VISIT (OUTPATIENT)
Dept: NEUROLOGY | Facility: CLINIC | Age: 48
End: 2023-01-11
Payer: MEDICARE

## 2023-01-11 DIAGNOSIS — R41.9 COGNITIVE COMPLAINTS: ICD-10-CM

## 2023-01-11 DIAGNOSIS — F40.240 CLAUSTROPHOBIA: Primary | ICD-10-CM

## 2023-01-11 DIAGNOSIS — R41.89 COGNITIVE CHANGE: ICD-10-CM

## 2023-01-11 PROCEDURE — 99204 OFFICE O/P NEW MOD 45 MIN: CPT | Performed by: STUDENT IN AN ORGANIZED HEALTH CARE EDUCATION/TRAINING PROGRAM

## 2023-01-11 RX ORDER — ALPRAZOLAM 0.5 MG/1
0.5 TABLET ORAL ONCE AS NEEDED
Qty: 1 TABLET | Refills: 0 | Status: SHIPPED | OUTPATIENT
Start: 2023-01-11 | End: 2023-01-30

## 2023-01-12 VITALS — RESPIRATION RATE: 16 BRPM | HEIGHT: 66 IN | BODY MASS INDEX: 41.45 KG/M2 | HEART RATE: 64 BPM | WEIGHT: 257.94 LBS

## 2023-01-19 ENCOUNTER — PATIENT ROUNDING (BHMG ONLY) (OUTPATIENT)
Dept: NEUROLOGY | Facility: CLINIC | Age: 48
End: 2023-01-19
Payer: MEDICARE

## 2023-01-30 ENCOUNTER — OFFICE VISIT (OUTPATIENT)
Dept: FAMILY MEDICINE CLINIC | Facility: CLINIC | Age: 48
End: 2023-01-30
Payer: MEDICARE

## 2023-01-30 VITALS
OXYGEN SATURATION: 99 % | DIASTOLIC BLOOD PRESSURE: 78 MMHG | HEART RATE: 86 BPM | SYSTOLIC BLOOD PRESSURE: 130 MMHG | BODY MASS INDEX: 39.86 KG/M2 | TEMPERATURE: 98 F | WEIGHT: 248 LBS | HEIGHT: 66 IN

## 2023-01-30 DIAGNOSIS — M17.0 OSTEOARTHRITIS OF BOTH KNEES, UNSPECIFIED OSTEOARTHRITIS TYPE: ICD-10-CM

## 2023-01-30 DIAGNOSIS — N30.00 ACUTE CYSTITIS WITHOUT HEMATURIA: Primary | ICD-10-CM

## 2023-01-30 DIAGNOSIS — G89.21 CHRONIC PAIN DUE TO INJURY: ICD-10-CM

## 2023-01-30 DIAGNOSIS — R35.0 URINE FREQUENCY: ICD-10-CM

## 2023-01-30 LAB
BILIRUB BLD-MCNC: NEGATIVE MG/DL
CLARITY, POC: CLEAR
COLOR UR: YELLOW
EXPIRATION DATE: ABNORMAL
GLUCOSE UR STRIP-MCNC: NEGATIVE MG/DL
KETONES UR QL: NEGATIVE
LEUKOCYTE EST, POC: ABNORMAL
Lab: ABNORMAL
NITRITE UR-MCNC: NEGATIVE MG/ML
PH UR: 6 [PH] (ref 5–8)
PROT UR STRIP-MCNC: NEGATIVE MG/DL
RBC # UR STRIP: NEGATIVE /UL
SP GR UR: 1 (ref 1–1.03)
UROBILINOGEN UR QL: NORMAL

## 2023-01-30 PROCEDURE — 81003 URINALYSIS AUTO W/O SCOPE: CPT | Performed by: NURSE PRACTITIONER

## 2023-01-30 PROCEDURE — 99213 OFFICE O/P EST LOW 20 MIN: CPT | Performed by: NURSE PRACTITIONER

## 2023-01-30 RX ORDER — NITROFURANTOIN 25; 75 MG/1; MG/1
100 CAPSULE ORAL 2 TIMES DAILY
Qty: 14 CAPSULE | Refills: 0 | Status: SHIPPED | OUTPATIENT
Start: 2023-01-30 | End: 2023-02-06

## 2023-01-30 RX ORDER — HYDROCODONE BITARTRATE AND ACETAMINOPHEN 5; 325 MG/1; MG/1
1 TABLET ORAL EVERY 8 HOURS PRN
Qty: 7 TABLET | Refills: 0 | Status: SHIPPED | OUTPATIENT
Start: 2023-01-30 | End: 2023-02-20 | Stop reason: SDUPTHER

## 2023-01-30 NOTE — PROGRESS NOTES
"Chief Complaint  Urinary Tract Infection (Increased urinary frequency and burning x1W. Drinking cranberry juice. )    Masks/face shield/appropriate PPE were worn for the entirety of the visit by the patient, MA, and provider.     Subjective        Aurora Bull presents to [facility]  History of Present Illness  Aurora Bull is a 47 y.o. female who presents to the clinic today with concerns of a urinary tract infection.  Patient's symptoms started 1 week ago with increased urinary frequency and burning with urination.  She has also had some urinary urgency.  She denies hematuria or flank pain.  She denies odor to her urine.      Review of Systems   Constitutional: Negative for chills, fatigue and fever.   Genitourinary: Positive for dysuria, frequency and urgency. Negative for flank pain and hematuria.     Objective   Vital Signs:  /78   Pulse 86   Temp 98 °F (36.7 °C)   Ht 167.6 cm (65.98\")   Wt 112 kg (248 lb)   SpO2 99%   BMI 40.05 kg/m²   Estimated body mass index is 40.05 kg/m² as calculated from the following:    Height as of this encounter: 167.6 cm (65.98\").    Weight as of this encounter: 112 kg (248 lb).             Physical Exam  Vitals reviewed.   Constitutional:       General: She is not in acute distress.     Appearance: Normal appearance. She is not ill-appearing, toxic-appearing or diaphoretic.   HENT:      Head: Normocephalic and atraumatic.   Eyes:      General: No scleral icterus.        Right eye: No discharge.         Left eye: No discharge.      Extraocular Movements: Extraocular movements intact.   Pulmonary:      Effort: No respiratory distress.   Abdominal:      General: There is no distension.      Tenderness: There is no abdominal tenderness. There is no right CVA tenderness or left CVA tenderness.   Neurological:      General: No focal deficit present.      Mental Status: She is alert and oriented to person, place, and time.      Motor: No " weakness.      Gait: Gait normal.   Psychiatric:         Mood and Affect: Mood normal.         Behavior: Behavior normal.        Result Review :         Brief Urine Lab Results  (Last result in the past 365 days)      Color   Clarity   Blood   Leuk Est   Nitrite   Protein   CREAT   Urine HCG        01/30/23 1435 Yellow   Clear   Negative   Small (1+)   Negative   Negative                           Assessment and Plan   Diagnoses and all orders for this visit:    1. Acute cystitis without hematuria (Primary)  -     nitrofurantoin, macrocrystal-monohydrate, (Macrobid) 100 MG capsule; Take 1 capsule by mouth 2 (Two) Times a Day for 7 days.  Dispense: 14 capsule; Refill: 0    2. Urine frequency  -     Urine Culture - Urine, Urine, Clean Catch  -     POCT urinalysis dipstick, automated      Patient presents today with concerns of a urinary tract infection.  Urine is positive for leukocytes, negative for blood, and negative for nitrites.  As patient is symptomatic, will treat with Macrobid.  Will call patient once urine culture results are received and if antibiotic needs to be modified.  Patient advised to call for any fever, chills, abdominal pain, or flank pain.         Follow Up   Return if symptoms worsen or fail to improve.  Patient was given instructions and counseling regarding her condition or for health maintenance advice. Please see specific information pulled into the AVS if appropriate.     Electronically signed by MJ Valadez, 01/30/23, 2:52 PM EST.    all other ROS negative except as per HPI

## 2023-01-30 NOTE — TELEPHONE ENCOUNTER
Rx Refill Note  Requested Prescriptions     Pending Prescriptions Disp Refills   • HYDROcodone-acetaminophen (NORCO) 5-325 MG per tablet 7 tablet 0     Sig: Take 1 tablet by mouth Every 8 (Eight) Hours As Needed for Severe Pain.      Last office visit with prescribing clinician: 1/30/2023   Last telemedicine visit with prescribing clinician: 4/5/2023   Next office visit with prescribing clinician: 4/5/2023                         Would you like a call back once the refill request has been completed: [] Yes [] No    If the office needs to give you a call back, can they leave a voicemail: [] Yes [] No    Seema Cabrales MA/LMR  01/30/23, 16:16 EST

## 2023-02-02 LAB
BACTERIA UR CULT: ABNORMAL
OTHER ANTIBIOTIC SUSC ISLT: ABNORMAL

## 2023-02-05 ENCOUNTER — HOSPITAL ENCOUNTER (OUTPATIENT)
Dept: MRI IMAGING | Facility: HOSPITAL | Age: 48
Discharge: HOME | End: 2023-02-05
Admitting: STUDENT IN AN ORGANIZED HEALTH CARE EDUCATION/TRAINING PROGRAM
Payer: MEDICARE

## 2023-02-05 DIAGNOSIS — R41.89 COGNITIVE CHANGE: ICD-10-CM

## 2023-02-05 PROCEDURE — 70551 MRI BRAIN STEM W/O DYE: CPT

## 2023-02-20 DIAGNOSIS — M17.0 OSTEOARTHRITIS OF BOTH KNEES, UNSPECIFIED OSTEOARTHRITIS TYPE: ICD-10-CM

## 2023-02-20 DIAGNOSIS — G89.21 CHRONIC PAIN DUE TO INJURY: ICD-10-CM

## 2023-02-21 NOTE — TELEPHONE ENCOUNTER
Rx Refill Note  Requested Prescriptions     Pending Prescriptions Disp Refills   • HYDROcodone-acetaminophen (NORCO) 5-325 MG per tablet 7 tablet 0     Sig: Take 1 tablet by mouth Every 8 (Eight) Hours As Needed for Severe Pain.      Last office visit with prescribing clinician: 1/30/2023   Last telemedicine visit with prescribing clinician: 4/5/2023   Next office visit with prescribing clinician: 4/5/2023                         Would you like a call back once the refill request has been completed: [] Yes [] No    If the office needs to give you a call back, can they leave a voicemail: [] Yes [] No    Seema Cabrales MA/LMR  02/21/23, 07:35 EST

## 2023-02-22 RX ORDER — HYDROCODONE BITARTRATE AND ACETAMINOPHEN 5; 325 MG/1; MG/1
1 TABLET ORAL EVERY 8 HOURS PRN
Qty: 7 TABLET | Refills: 0 | Status: SHIPPED | OUTPATIENT
Start: 2023-02-22 | End: 2023-03-24 | Stop reason: SDUPTHER

## 2023-03-08 ENCOUNTER — TELEPHONE (OUTPATIENT)
Dept: FAMILY MEDICINE CLINIC | Facility: CLINIC | Age: 48
End: 2023-03-08
Payer: MEDICARE

## 2023-03-08 NOTE — TELEPHONE ENCOUNTER
Our credentialing office has already contacted [facility] on this issue so patient will need to follow up with the billing office as well.

## 2023-03-08 NOTE — TELEPHONE ENCOUNTER
[facility] has been charging a speciality copay instead of an primary fee. Please advise

## 2023-03-24 DIAGNOSIS — G89.21 CHRONIC PAIN DUE TO INJURY: ICD-10-CM

## 2023-03-24 DIAGNOSIS — M17.0 OSTEOARTHRITIS OF BOTH KNEES, UNSPECIFIED OSTEOARTHRITIS TYPE: ICD-10-CM

## 2023-03-27 RX ORDER — HYDROCODONE BITARTRATE AND ACETAMINOPHEN 5; 325 MG/1; MG/1
1 TABLET ORAL EVERY 8 HOURS PRN
Qty: 7 TABLET | Refills: 0 | Status: SHIPPED | OUTPATIENT
Start: 2023-03-27 | End: 2023-04-05

## 2023-03-27 NOTE — TELEPHONE ENCOUNTER
Rx Refill Note  Requested Prescriptions     Pending Prescriptions Disp Refills   • HYDROcodone-acetaminophen (NORCO) 5-325 MG per tablet 7 tablet 0     Sig: Take 1 tablet by mouth Every 8 (Eight) Hours As Needed for Severe Pain.      Last office visit with prescribing clinician: 1/30/2023   Last telemedicine visit with prescribing clinician: 4/5/2023   Next office visit with prescribing clinician: 4/5/2023                         Would you like a call back once the refill request has been completed: [] Yes [] No    If the office needs to give you a call back, can they leave a voicemail: [] Yes [] No    Shar Cruz MA  03/27/23, 07:47 EDT

## 2023-04-03 NOTE — PROGRESS NOTES
"Chief Complaint  Pain (Bilateral knees pain med refills)    Subjective        Aurora Bull presents to [facility]  History of Present Illness  Aurora Bull is a 47 y.o. female who presents to clinic today to follow-up on chronic bilateral knee pain.  Patient has been evaluated by pain management as well as orthopedic surgery.  Patient has had x-rays performed showing advanced arthritic change with large osteophytes in both knees.  Orthopedic surgeon preferred to manage with nonoperative therapy and weight loss.  She has also been seen by pain management.  She has underwent nerve blocks as well as steroid injections in the knee.  Patient has been through physical therapy and aquatic therapy.  She had surgeries performed on her knee to help rebuild the cartilage.  Despite all these interventions, patient's pain has persisted.  Due to severe pain, patient has been prescribed a limited dose of hydrocodone-acetaminophen to take as needed.  This does help some of the pain, but it does not last very long.  Patient experiences pain with standing and also with sitting.  The only time she gets relief is when she is lying in bed a certain way.  When she is able to lose weight, she does notice a difference in her pain.  When patient was being seen in [place], she was following with pain management and was prescribed long-acting pain medication.     Review of Systems   Musculoskeletal: Positive for arthralgias.      Objective   Vital Signs:  /60 (BP Location: Right arm, Patient Position: Sitting, Cuff Size: Adult)   Pulse 100   Temp 98 °F (36.7 °C)   Ht 167.6 cm (66\")   Wt 117 kg (257 lb 3.2 oz)   SpO2 99%   BMI 41.51 kg/m²   Estimated body mass index is 41.51 kg/m² as calculated from the following:    Height as of this encounter: 167.6 cm (66\").    Weight as of this encounter: 117 kg (257 lb 3.2 oz).             Physical Exam  Vitals reviewed.   Constitutional:       General: She " is not in acute distress.     Appearance: Normal appearance. She is not ill-appearing, toxic-appearing or diaphoretic.   Musculoskeletal:      Right lower leg: No edema.      Left lower leg: No edema.      Comments: Ambulates with cane   Neurological:      General: No focal deficit present.      Mental Status: She is alert and oriented to person, place, and time.      Motor: Weakness present.      Gait: Gait abnormal.   Psychiatric:         Mood and Affect: Mood normal.         Behavior: Behavior normal.        Result Review :      Data reviewed: Consultant notes pain management, orthopedic surgery  XR Knee 3 View Bilateral (06/20/2022 14:36)  Progress Notes by John Diaz MD (11/10/2021 14:00)  H&P (View-Only) by Emily Smith MD (03/10/2022 14:40)             Assessment and Plan   Diagnoses and all orders for this visit:    1. Chronic pain of both knees (Primary)  -     Ambulatory Referral to Pain Management  -     HYDROcodone-acetaminophen (NORCO)  MG per tablet; Take 1 tablet by mouth Every 6 (Six) Hours As Needed for Moderate Pain.  Dispense: 10 tablet; Refill: 0    2. Osteoarthritis of both knees, unspecified osteoarthritis type  -     Ambulatory Referral to Pain Management  -     HYDROcodone-acetaminophen (NORCO)  MG per tablet; Take 1 tablet by mouth Every 6 (Six) Hours As Needed for Moderate Pain.  Dispense: 10 tablet; Refill: 0      Patient continues to have chronic knee pain despite multiple interventions.  She is currently following with a psychiatrist for schizoaffective disorder.  Pain medication was previously discussed with her specialists. OK to move forward with as needed pain medication in limited quantities given the patient's history.  At this time, patient's pain needs to be better managed in order to perform activities of daily living and lose weight.  Patient has been compliant with all follow-ups, urine drug screens, and use of Norco as needed.  Urine drug screen last  performed August 2022 along with controlled substance contract.  Hydrocodone-acetaminophen last filled 3/11/2023 for quantity of 7.  Will increase Norco dose to  mg tablets for patient to take as needed.  Patient has also been referred to pain management for a second opinion.  We can consider weight loss medication as well moving forward.         Follow Up   Return in about 3 months (around 7/5/2023), or if symptoms worsen or fail to improve.  Patient was given instructions and counseling regarding her condition or for health maintenance advice. Please see specific information pulled into the AVS if appropriate.     Electronically signed by MJ Valadez, 04/06/23, 8:35 AM EDT.

## 2023-04-05 ENCOUNTER — OFFICE VISIT (OUTPATIENT)
Dept: FAMILY MEDICINE CLINIC | Facility: CLINIC | Age: 48
End: 2023-04-05
Payer: MEDICARE

## 2023-04-05 VITALS
TEMPERATURE: 98 F | OXYGEN SATURATION: 99 % | DIASTOLIC BLOOD PRESSURE: 60 MMHG | WEIGHT: 257.2 LBS | HEIGHT: 66 IN | HEART RATE: 100 BPM | BODY MASS INDEX: 41.33 KG/M2 | SYSTOLIC BLOOD PRESSURE: 100 MMHG

## 2023-04-05 DIAGNOSIS — M25.561 CHRONIC PAIN OF BOTH KNEES: Primary | ICD-10-CM

## 2023-04-05 DIAGNOSIS — G89.29 CHRONIC PAIN OF BOTH KNEES: Primary | ICD-10-CM

## 2023-04-05 DIAGNOSIS — M17.0 OSTEOARTHRITIS OF BOTH KNEES, UNSPECIFIED OSTEOARTHRITIS TYPE: ICD-10-CM

## 2023-04-05 DIAGNOSIS — M25.562 CHRONIC PAIN OF BOTH KNEES: Primary | ICD-10-CM

## 2023-04-05 RX ORDER — HYDROCODONE BITARTRATE AND ACETAMINOPHEN 10; 325 MG/1; MG/1
1 TABLET ORAL EVERY 6 HOURS PRN
Qty: 10 TABLET | Refills: 0 | Status: SHIPPED | OUTPATIENT
Start: 2023-04-05

## 2023-04-24 DIAGNOSIS — M25.562 CHRONIC PAIN OF BOTH KNEES: ICD-10-CM

## 2023-04-24 DIAGNOSIS — G89.29 CHRONIC PAIN OF BOTH KNEES: ICD-10-CM

## 2023-04-24 DIAGNOSIS — M25.561 CHRONIC PAIN OF BOTH KNEES: ICD-10-CM

## 2023-04-24 DIAGNOSIS — M17.0 OSTEOARTHRITIS OF BOTH KNEES, UNSPECIFIED OSTEOARTHRITIS TYPE: ICD-10-CM

## 2023-04-25 NOTE — TELEPHONE ENCOUNTER
Rx Refill Note  Requested Prescriptions     Pending Prescriptions Disp Refills   • HYDROcodone-acetaminophen (NORCO)  MG per tablet 10 tablet 0     Sig: Take 1 tablet by mouth Every 6 (Six) Hours As Needed for Moderate Pain.      Last office visit with prescribing clinician: 4/5/2023   Last telemedicine visit with prescribing clinician: 7/5/2023   Next office visit with prescribing clinician: 7/5/2023                         Would you like a call back once the refill request has been completed: [] Yes [] No    If the office needs to give you a call back, can they leave a voicemail: [] Yes [] No    Seema Cabrales MA/LMR  04/25/23, 07:45 EDT

## 2023-04-26 RX ORDER — HYDROCODONE BITARTRATE AND ACETAMINOPHEN 10; 325 MG/1; MG/1
1 TABLET ORAL EVERY 6 HOURS PRN
Qty: 10 TABLET | Refills: 0 | Status: SHIPPED | OUTPATIENT
Start: 2023-04-26

## 2023-05-07 DIAGNOSIS — M25.562 CHRONIC PAIN OF BOTH KNEES: ICD-10-CM

## 2023-05-07 DIAGNOSIS — M25.561 CHRONIC PAIN OF BOTH KNEES: ICD-10-CM

## 2023-05-07 DIAGNOSIS — G89.29 CHRONIC PAIN OF BOTH KNEES: ICD-10-CM

## 2023-05-07 DIAGNOSIS — M17.0 OSTEOARTHRITIS OF BOTH KNEES, UNSPECIFIED OSTEOARTHRITIS TYPE: ICD-10-CM

## 2023-05-08 RX ORDER — HYDROCODONE BITARTRATE AND ACETAMINOPHEN 10; 325 MG/1; MG/1
1 TABLET ORAL EVERY 6 HOURS PRN
Qty: 10 TABLET | Refills: 0 | Status: SHIPPED | OUTPATIENT
Start: 2023-05-08

## 2023-05-08 NOTE — TELEPHONE ENCOUNTER
Please fill while Sonia is out of the office     Rx Refill Note  Requested Prescriptions     Pending Prescriptions Disp Refills   • HYDROcodone-acetaminophen (NORCO)  MG per tablet 10 tablet 0     Sig: Take 1 tablet by mouth Every 6 (Six) Hours As Needed for Moderate Pain.      Last office visit with prescribing clinician: 4/5/2023   Last telemedicine visit with prescribing clinician: 7/5/2023   Next office visit with prescribing clinician: 7/5/2023                         Would you like a call back once the refill request has been completed: [] Yes [] No    If the office needs to give you a call back, can they leave a voicemail: [] Yes [] No    Seema Cabrales MA/LMR  05/08/23, 08:09 EDT

## 2023-05-17 DIAGNOSIS — M25.562 CHRONIC PAIN OF BOTH KNEES: ICD-10-CM

## 2023-05-17 DIAGNOSIS — M25.561 CHRONIC PAIN OF BOTH KNEES: ICD-10-CM

## 2023-05-17 DIAGNOSIS — G89.29 CHRONIC PAIN OF BOTH KNEES: ICD-10-CM

## 2023-05-17 DIAGNOSIS — M17.0 OSTEOARTHRITIS OF BOTH KNEES, UNSPECIFIED OSTEOARTHRITIS TYPE: ICD-10-CM

## 2023-05-17 RX ORDER — HYDROCODONE BITARTRATE AND ACETAMINOPHEN 10; 325 MG/1; MG/1
1 TABLET ORAL EVERY 6 HOURS PRN
Qty: 10 TABLET | Refills: 0 | Status: SHIPPED | OUTPATIENT
Start: 2023-05-17

## 2023-05-17 NOTE — TELEPHONE ENCOUNTER
Rx Refill Note  Requested Prescriptions     Pending Prescriptions Disp Refills   • HYDROcodone-acetaminophen (NORCO)  MG per tablet 10 tablet 0     Sig: Take 1 tablet by mouth Every 6 (Six) Hours As Needed for Moderate Pain.      Last office visit with prescribing clinician: Visit date not found   Last telemedicine visit with prescribing clinician: 5/7/2023   Next office visit with prescribing clinician: Visit date not found                         Would you like a call back once the refill request has been completed: [] Yes [] No    If the office needs to give you a call back, can they leave a voicemail: [] Yes [] No    Seema Cabrales MA/LMR  05/17/23, 14:04 EDT

## 2023-05-24 DIAGNOSIS — G89.29 CHRONIC PAIN OF BOTH KNEES: ICD-10-CM

## 2023-05-24 DIAGNOSIS — M17.0 OSTEOARTHRITIS OF BOTH KNEES, UNSPECIFIED OSTEOARTHRITIS TYPE: ICD-10-CM

## 2023-05-24 DIAGNOSIS — M25.561 CHRONIC PAIN OF BOTH KNEES: ICD-10-CM

## 2023-05-24 DIAGNOSIS — M25.562 CHRONIC PAIN OF BOTH KNEES: ICD-10-CM

## 2023-05-24 RX ORDER — HYDROCODONE BITARTRATE AND ACETAMINOPHEN 10; 325 MG/1; MG/1
1 TABLET ORAL EVERY 6 HOURS PRN
Qty: 20 TABLET | Refills: 0 | Status: SHIPPED | OUTPATIENT
Start: 2023-05-24

## 2023-05-24 NOTE — TELEPHONE ENCOUNTER
Rx Refill Note  Requested Prescriptions     Pending Prescriptions Disp Refills   • HYDROcodone-acetaminophen (NORCO)  MG per tablet 10 tablet 0     Sig: Take 1 tablet by mouth Every 6 (Six) Hours As Needed for Moderate Pain.      Last office visit with prescribing clinician: 4/5/2023   Last telemedicine visit with prescribing clinician: Visit date not found   Next office visit with prescribing clinician: 7/5/2023                         Would you like a call back once the refill request has been completed: [] Yes [] No    If the office needs to give you a call back, can they leave a voicemail: [] Yes [] No    Seema Cabrales MA/LMR  05/24/23, 16:10 EDT

## 2023-07-23 DIAGNOSIS — M25.561 CHRONIC PAIN OF BOTH KNEES: ICD-10-CM

## 2023-07-23 DIAGNOSIS — G89.29 CHRONIC PAIN OF BOTH KNEES: ICD-10-CM

## 2023-07-23 DIAGNOSIS — M17.0 OSTEOARTHRITIS OF BOTH KNEES, UNSPECIFIED OSTEOARTHRITIS TYPE: ICD-10-CM

## 2023-07-23 DIAGNOSIS — M25.562 CHRONIC PAIN OF BOTH KNEES: ICD-10-CM

## 2023-07-24 RX ORDER — HYDROCODONE BITARTRATE AND ACETAMINOPHEN 10; 325 MG/1; MG/1
1 TABLET ORAL EVERY 6 HOURS PRN
Qty: 20 TABLET | Refills: 0 | Status: SHIPPED | OUTPATIENT
Start: 2023-07-24

## 2023-07-24 NOTE — TELEPHONE ENCOUNTER
Rx Refill Note  Requested Prescriptions     Pending Prescriptions Disp Refills    HYDROcodone-acetaminophen (NORCO)  MG per tablet 20 tablet 0     Sig: Take 1 tablet by mouth Every 6 (Six) Hours As Needed for Moderate Pain.      Last office visit with prescribing clinician: 7/5/2023   Last telemedicine visit with prescribing clinician: Visit date not found   Next office visit with prescribing clinician: 10/5/2023                         Would you like a call back once the refill request has been completed: [] Yes [] No    If the office needs to give you a call back, can they leave a voicemail: [] Yes [] No    Shar Cruz MA  07/24/23, 07:58 EDT

## 2023-08-23 DIAGNOSIS — G89.29 CHRONIC PAIN OF BOTH KNEES: ICD-10-CM

## 2023-08-23 DIAGNOSIS — M25.562 CHRONIC PAIN OF BOTH KNEES: ICD-10-CM

## 2023-08-23 DIAGNOSIS — M17.0 OSTEOARTHRITIS OF BOTH KNEES, UNSPECIFIED OSTEOARTHRITIS TYPE: ICD-10-CM

## 2023-08-23 DIAGNOSIS — M25.561 CHRONIC PAIN OF BOTH KNEES: ICD-10-CM

## 2023-08-24 RX ORDER — HYDROCODONE BITARTRATE AND ACETAMINOPHEN 10; 325 MG/1; MG/1
1 TABLET ORAL EVERY 6 HOURS PRN
Qty: 20 TABLET | Refills: 0 | Status: SHIPPED | OUTPATIENT
Start: 2023-08-24

## 2023-08-24 NOTE — TELEPHONE ENCOUNTER
Rx Refill Note  Requested Prescriptions     Pending Prescriptions Disp Refills    HYDROcodone-acetaminophen (NORCO)  MG per tablet 20 tablet 0     Sig: Take 1 tablet by mouth Every 6 (Six) Hours As Needed for Moderate Pain.      Last office visit with prescribing clinician: 7/5/2023   Last telemedicine visit with prescribing clinician: Visit date not found   Next office visit with prescribing clinician: 10/5/2023                         Would you like a call back once the refill request has been completed: [] Yes [] No    If the office needs to give you a call back, can they leave a voicemail: [] Yes [] No    Shar Cruz MA  08/24/23, 07:31 EDT

## 2023-09-14 ENCOUNTER — OFFICE VISIT (OUTPATIENT)
Dept: ORTHOPEDIC SURGERY | Facility: CLINIC | Age: 48
End: 2023-09-14
Payer: MEDICARE

## 2023-09-14 VITALS — TEMPERATURE: 97.8 F | BODY MASS INDEX: 41.14 KG/M2 | HEIGHT: 66 IN | WEIGHT: 256 LBS

## 2023-09-14 DIAGNOSIS — M17.0 OSTEOARTHRITIS OF BOTH KNEES, UNSPECIFIED OSTEOARTHRITIS TYPE: ICD-10-CM

## 2023-09-14 DIAGNOSIS — R52 PAIN: Primary | ICD-10-CM

## 2023-09-14 PROCEDURE — 99213 OFFICE O/P EST LOW 20 MIN: CPT | Performed by: NURSE PRACTITIONER

## 2023-09-14 NOTE — PROGRESS NOTES
Answers submitted by the patient for this visit:  Other (Submitted on 9/14/2023)  Please describe your symptoms.: Right knee pain different than usual pain  Have you had these symptoms before?: No  How long have you been having these symptoms?: 1-2 weeks  Primary Reason for Visit (Submitted on 9/14/2023)  What is the primary reason for your visit?: Other  Patient Name: Aurora Bull   YOB: 1975  Referring Primary Care Physician: Sonia Christiansen APRN  BMI: Body mass index is 41.32 kg/m².    Chief Complaint:    Chief Complaint   Patient presents with    Right Knee - Pain        HPI: Pt has a hx of right knee pain and pt has done PT and knee is not better. Cortisone has not helped. Pt can not exercise due to  pain in knees and she has pain under her knee cap. Pt is trying to walk and exercise. Pt gets pain meds for her knees from PCP   Pt has a hx of gastric bypass     Aurora Bull is a 48 y.o. female who presents today for evaluation of   Chief Complaint   Patient presents with    Right Knee - Pain         Subjective   Medications:   Home Medications:  Current Outpatient Medications on File Prior to Visit   Medication Sig    cyclobenzaprine (FLEXERIL) 10 MG tablet Take 1 tablet by mouth 3 (Three) Times a Day As Needed for Muscle Spasms.    dicyclomine (BENTYL) 20 MG tablet Take 1 tablet by mouth 4 (Four) Times a Day As Needed (diarrhea).    diphenoxylate-atropine (LOMOTIL) 2.5-0.025 MG per tablet Take 1 tablet by mouth 4 (Four) Times a Day As Needed for Diarrhea.    fluvoxaMINE (LUVOX) 100 MG tablet Take 1 tablet by mouth 2 (Two) Times a Day.    HYDROcodone-acetaminophen (NORCO)  MG per tablet Take 1 tablet by mouth Every 6 (Six) Hours As Needed for Moderate Pain.    ipratropium-albuterol (COMBIVENT RESPIMAT)  MCG/ACT inhaler Inhale 1 puff 4 (Four) Times a Day As Needed for Wheezing.    lamoTRIgine (LaMICtal) 150 MG tablet Take 1 tablet by mouth Daily.    OLANZapine zydis (zyPREXA) 5  MG disintegrating tablet Place 1 tablet on the tongue Every Night. dissolve one tablet by mouth for increase psychosis may repeat in 30 minutes if needed. Limit 2 tablets a day    prazosin (MINIPRESS) 2 MG capsule Take 2 capsules by mouth Every Night. 1-4 tablets nightly (Patient taking differently: Take 5 mg by mouth Every Night.)    ziprasidone (GEODON) 80 MG capsule Take 1 capsule by mouth 2 (Two) Times a Day.    clonazePAM (KlonoPIN) 1 MG tablet Take 1 tablet by mouth 2 (Two) Times a Day. (Patient not taking: Reported on 2023)     No current facility-administered medications on file prior to visit.     Current Medications:  Scheduled Meds:  Continuous Infusions:No current facility-administered medications for this visit.    PRN Meds:.    I have reviewed the patient's medical history in detail and updated the computerized patient record.  Review and summarization of old records includes:    Past Medical History:   Diagnosis Date    Asthma     Chronic diarrhea     Chronic knee pain     bilateral    Chronic lower back pain     Depression     Dislocated elbow     Kidney stones     Knee swelling     Low back strain 2017    Neck strain 2017    Osgood-Schlatter's disease 1988    PTSD (post-traumatic stress disorder)     Schizoaffective disorder         Past Surgical History:   Procedure Laterality Date    ADENOIDECTOMY      CARDIAC CATHETERIZATION       SECTION      CHOLECYSTECTOMY      COLONOSCOPY  2019    COLONOSCOPY N/A 2022    Procedure: COLONOSCOPY, BIOPSIES, POLYPECTOMY;  Surgeon: Jerald Keller MD;  [place] [facility] [place];  Service: Gastroenterology;  Laterality: N/A;  POLYP X 1    ENDOSCOPY      ENDOSCOPY N/A 2022    Procedure: ESOPHAGOGASTRODUODENOSCOPY WITH BIOPSIES;  Surgeon: Jerald Keller MD;  [place] [facility] [place];  Service: Gastroenterology;  Laterality: N/A;  ESOPHAGITIS, HIATAL HERNIA      GASTRIC SLEEVE LAPAROSCOPIC       HYSTERECTOMY  2019    KNEE CARTILAGE SURGERY Right 2015    KNEE SURGERY Right 2016    scope    NERVE BLOCK Bilateral 3/21/2022    Procedure: KNEE GENICULAR NERVE BLOCK UNDER FLUORO bilateral;  Surgeon: Emily Smith MD;  [place] [facility] [place] [facility] [place]  Service: Pain Management;  Laterality: Bilateral;    TONSILLECTOMY  1988    UPPER GASTROINTESTINAL ENDOSCOPY          Social History     Occupational History    Not on file   Tobacco Use    Smoking status: Former     Packs/day: 1.50     Years: 24.00     Pack years: 36.00     Types: Cigarettes    Smokeless tobacco: Never    Tobacco comments:     Quit in 2014   Vaping Use    Vaping Use: Never used   Substance and Sexual Activity    Alcohol use: Yes     Alcohol/week: 2.0 standard drinks     Types: 2 Shots of liquor per week     Comment: 2 shots nights    Drug use: Not Currently    Sexual activity: Yes      Social History     Social History Narrative    Not on file        Family History   Problem Relation Age of Onset    Diabetes Mother     Diabetes Father     Hyperlipidemia Father     Heart disease Father     Hypertension Father     Hyperlipidemia Brother     Diabetes Brother     Heart block Brother     Scoliosis Son     Diabetes Maternal Aunt     Dwarfism Maternal Uncle     Stroke Maternal Uncle     Hypertension Paternal Uncle     Mental illness Maternal Grandmother     Diabetes Maternal Grandmother     Memory loss Maternal Grandmother     Esophageal cancer Maternal Grandfather     Alzheimer's disease Paternal Grandmother     Hypertension Paternal Grandmother     Hypertension Paternal Grandfather     Heart murmur Paternal Grandfather     Seasonal affective disorder Brother     Drug abuse Brother     Mental illness Brother     Alcohol abuse Brother     Colon cancer Neg Hx     Colon polyps Neg Hx     Crohn's disease Neg Hx     Irritable bowel syndrome Neg Hx     Ulcerative colitis Neg Hx        ROS: 14 point review of systems was performed and all other systems were reviewed  "and are negative except for documented findings in HPI and today's encounter.     Allergies: No Known Allergies  Constitutional:  Denies fever, shaking or chills   Eyes:  Denies change in visual acuity   HENT:  Denies nasal congestion or sore throat   Respiratory:  Denies cough or shortness of breath   Cardiovascular:  Denies chest pain or severe LE edema   GI:  Denies abdominal pain, nausea, vomiting, bloody stools or diarrhea   Musculoskeletal:  Numbness, tingling, pain, or loss of motor function only as noted above in history of present illness.  : Denies painful urination or hematuria  Integument:  Denies rash, lesion or ulceration   Neurologic:  Denies headache or focal weakness  Endocrine:  Denies lymphadenopathy  Psych:  Denies confusion or change in mental status   Hem:  Denies active bleeding    OBJECTIVE:  Physical Exam: 48 y.o. female  Wt Readings from Last 3 Encounters:   09/14/23 116 kg (256 lb)   07/05/23 117 kg (259 lb)   04/05/23 117 kg (257 lb 3.2 oz)     Ht Readings from Last 1 Encounters:   09/14/23 167.6 cm (66\")     Body mass index is 41.32 kg/m².  Vitals:    09/14/23 1423   Temp: 97.8 °F (36.6 °C)     Vital signs reviewed.     General Appearance:    Alert, cooperative, in no acute distress                  Eyes: conjunctiva clear  ENT: external ears and nose atraumatic  CV: no peripheral edema  Resp: normal respiratory effort  Skin: no rashes or wounds; normal turgor  Psych: mood and affect appropriate  Lymph: no nodes appreciated  Neuro: gross sensation intact  Vascular:  Palpable peripheral pulse in noted extremity  Musculoskeletal Extremities: skin warm, dry and intact with calf soft nttp, medial joint line tenderness and patella femoral crepitation     Radiology:   Right knee 3 views done for pain with comparison views shows tricompartmental DJD advanced for her age.     Assessment:     ICD-10-CM ICD-9-CM   1. Pain  R52 780.96   2. Osteoarthritis of both knees, unspecified osteoarthritis " type  M17.0 715.96        Procedures  Work on weight loss and RICE     MDM/Plan:   The diagnosis(es), natural history, pathophysiology and treatment for diagnosis(es) were discussed. Opportunity given and questions answered.  Biomechanics of pertinent body areas discussed.  When appropriate, the use of ambulatory aids discussed.  BMI:  The concept of BMI body mass index and its importance and implications discussed.    EXERCISES:  Advice on benefits of, and types of regular/moderate exercise pertaining to orthopedic diagnosis(es).  MEDICATIONS:  The risks, benefits, warnings,side effects and alternatives of medications discussed.  Inflammation/pain control; with cold, heat, elevation and/or liniments discussed as appropriate  HOME EXERCISE/PT program encouraged  MEDICAL RECORDS reviewed from other provider(s) for past and current medical history pertinent to this complaint.      9/14/2023    Much of this encounter note is an electronic transcription/translation of spoken language to printed text. The electronic translation of spoken language may permit erroneous, or at times, nonsensical words or phrases to be inadvertently transcribed; Although I have reviewed the note for such errors, some may still exist

## 2023-09-27 DIAGNOSIS — G89.29 CHRONIC PAIN OF BOTH KNEES: ICD-10-CM

## 2023-09-27 DIAGNOSIS — M17.0 OSTEOARTHRITIS OF BOTH KNEES, UNSPECIFIED OSTEOARTHRITIS TYPE: ICD-10-CM

## 2023-09-27 DIAGNOSIS — M25.562 CHRONIC PAIN OF BOTH KNEES: ICD-10-CM

## 2023-09-27 DIAGNOSIS — M25.561 CHRONIC PAIN OF BOTH KNEES: ICD-10-CM

## 2023-09-27 RX ORDER — HYDROCODONE BITARTRATE AND ACETAMINOPHEN 10; 325 MG/1; MG/1
1 TABLET ORAL EVERY 6 HOURS PRN
Qty: 20 TABLET | Refills: 0 | Status: SHIPPED | OUTPATIENT
Start: 2023-09-27

## 2023-09-27 NOTE — TELEPHONE ENCOUNTER
UDS 7/5/23  CSA 7/25/23  Rx Refill Note  Requested Prescriptions     Pending Prescriptions Disp Refills    HYDROcodone-acetaminophen (NORCO)  MG per tablet 20 tablet 0     Sig: Take 1 tablet by mouth Every 6 (Six) Hours As Needed for Moderate Pain.      Last office visit with prescribing clinician: 7/5/2023   Last telemedicine visit with prescribing clinician: Visit date not found   Next office visit with prescribing clinician: 10/5/2023                         Would you like a call back once the refill request has been completed: [] Yes [] No    If the office needs to give you a call back, can they leave a voicemail: [] Yes [] No    Seema Cabrales CMA/LMR  09/27/23, 12:46 EDT

## 2023-10-05 ENCOUNTER — OFFICE VISIT (OUTPATIENT)
Dept: FAMILY MEDICINE CLINIC | Facility: CLINIC | Age: 48
End: 2023-10-05
Payer: MEDICARE

## 2023-10-05 VITALS
HEART RATE: 72 BPM | HEIGHT: 66 IN | BODY MASS INDEX: 41.3 KG/M2 | OXYGEN SATURATION: 96 % | TEMPERATURE: 97.7 F | SYSTOLIC BLOOD PRESSURE: 110 MMHG | DIASTOLIC BLOOD PRESSURE: 70 MMHG | WEIGHT: 257 LBS

## 2023-10-05 DIAGNOSIS — E66.01 CLASS 3 SEVERE OBESITY WITH SERIOUS COMORBIDITY AND BODY MASS INDEX (BMI) OF 40.0 TO 44.9 IN ADULT, UNSPECIFIED OBESITY TYPE: ICD-10-CM

## 2023-10-05 DIAGNOSIS — M17.0 OSTEOARTHRITIS OF BOTH KNEES, UNSPECIFIED OSTEOARTHRITIS TYPE: Primary | ICD-10-CM

## 2023-10-05 PROCEDURE — 1160F RVW MEDS BY RX/DR IN RCRD: CPT | Performed by: NURSE PRACTITIONER

## 2023-10-05 PROCEDURE — 99213 OFFICE O/P EST LOW 20 MIN: CPT | Performed by: NURSE PRACTITIONER

## 2023-10-05 PROCEDURE — 1159F MED LIST DOCD IN RCRD: CPT | Performed by: NURSE PRACTITIONER

## 2023-10-05 NOTE — ASSESSMENT & PLAN NOTE
Edgardo reviewed.  Hydrocodone last filled 9/27/2023 for quantity of 20.  Urine drug screen and controlled substance contract up-to-date. Refill upon request.

## 2023-10-05 NOTE — PROGRESS NOTES
"Chief Complaint  Osteoarthritis (Norco med follow up/-pt complains of pain today in both knees ) and Weight Loss (Pt would like to discuss rancho loss med options )    Subjective        Aurora Bull presents to [facility]  History of Present Illness  Aurora Bull is a 48 y.o. female who presents to the clinic today to follow-up on osteoarthritis.  Patient is currently receiving hydrocodone/acetaminophen  mg tablets every 6 hours as needed for chronic knee pain. NSAIDs in the past caused abnormal kidney function. Patient has been seen by her orthopedist and pain management. Bilateral knee x-rays show advanced degenerative disease. She has also undergone physical therapy, but her knee pain is not getting any better.  The hydrocodone/acetaminophen helps to dull the pain, but does not completely take the pain away.  She is only using this medication for severe pain.   She would also like to discuss weight loss medicine.  Patient needs to lose weight before she can be considered for a knee replacement.  She is not able to exercise due to the severity of her pain.  She denies family history of thyroid cancer.  She has had her gallbladder removed.  She denies history of pancreatitis.    Review of Systems   Constitutional:  Negative for chills, fatigue and fever.   Eyes:  Negative for photophobia and visual disturbance.   Musculoskeletal:  Positive for arthralgias.   Neurological:  Positive for weakness.      Objective   Vital Signs:  /70 (BP Location: Right arm, Patient Position: Sitting, Cuff Size: Large Adult)   Pulse 72   Temp 97.7 °F (36.5 °C) (Infrared)   Ht 167.6 cm (66\")   Wt 117 kg (257 lb)   SpO2 96%   BMI 41.48 kg/m²   Estimated body mass index is 41.48 kg/m² as calculated from the following:    Height as of this encounter: 167.6 cm (66\").    Weight as of this encounter: 117 kg (257 lb).         Physical Exam  Vitals reviewed.   Constitutional:       General: " She is not in acute distress.     Appearance: Normal appearance. She is obese. She is not ill-appearing, toxic-appearing or diaphoretic.   HENT:      Head: Normocephalic and atraumatic.   Eyes:      General: No scleral icterus.        Right eye: No discharge.         Left eye: No discharge.      Extraocular Movements: Extraocular movements intact.   Pulmonary:      Effort: Pulmonary effort is normal. No respiratory distress.   Musculoskeletal:      Right lower leg: No edema.      Left lower leg: No edema.   Neurological:      General: No focal deficit present.      Mental Status: She is alert and oriented to person, place, and time.      Motor: No weakness.      Gait: Gait normal.   Psychiatric:         Mood and Affect: Mood normal.         Behavior: Behavior normal.      Result Review :        CONTROLLED SUBSTANCE AGREEMENT - SCAN - KHADIJAH, OSTEOARTHRITIS, [id number] (07/05/2023)            Assessment and Plan   Diagnoses and all orders for this visit:    1. Osteoarthritis of both knees, unspecified osteoarthritis type (Primary)  Assessment & Plan:  Edgardo reviewed.  Hydrocodone last filled 9/27/2023 for quantity of 20.  Urine drug screen and controlled substance contract up-to-date. Refill upon request.       2. Class 3 severe obesity with serious comorbidity and body mass index (BMI) of 40.0 to 44.9 in adult, unspecified obesity type  -     Semaglutide,0.25 or 0.5MG/DOS, (OZEMPIC) 2 MG/3ML solution pen-injector; Inject 0.25 mg under the skin into the appropriate area as directed 1 (One) Time Per Week.  Dispense: 3 mL; Refill: 0      Patient's BMI is 41.48 kg/m².  Patient is not able to exercise due to her chronic knee pain and severity of degeneration.  I would not recommend Contrave given her current psychiatric medications. I would also advise against phentermine at this time.  GLP-1 medications were discussed.  We reviewed potential adverse side effects including thyroid cancer, pancreatitis, constipation,  diarrhea, nausea, decreased appetite, and gallstone formation.  Patient verbalizes understanding and would like to continue with GLP-1 medication.  Once she receives the medication, she will schedule medical assistant appointment for instruction on how to administer semaglutide injections.             Follow Up   Return in about 1 month (around 11/5/2023) for Recheck- weight loss early for fasting.  Patient was given instructions and counseling regarding her condition or for health maintenance advice. Please see specific information pulled into the AVS if appropriate.       Electronically signed by MJ Valadez, 10/05/23, 2:50 PM EDT.

## 2023-10-11 ENCOUNTER — PRIOR AUTHORIZATION (OUTPATIENT)
Dept: FAMILY MEDICINE CLINIC | Facility: CLINIC | Age: 48
End: 2023-10-11
Payer: MEDICARE

## 2023-10-23 DIAGNOSIS — M25.561 CHRONIC PAIN OF BOTH KNEES: ICD-10-CM

## 2023-10-23 DIAGNOSIS — M25.562 CHRONIC PAIN OF BOTH KNEES: ICD-10-CM

## 2023-10-23 DIAGNOSIS — M17.0 OSTEOARTHRITIS OF BOTH KNEES, UNSPECIFIED OSTEOARTHRITIS TYPE: ICD-10-CM

## 2023-10-23 DIAGNOSIS — G89.29 CHRONIC PAIN OF BOTH KNEES: ICD-10-CM

## 2023-10-24 RX ORDER — HYDROCODONE BITARTRATE AND ACETAMINOPHEN 10; 325 MG/1; MG/1
1 TABLET ORAL EVERY 6 HOURS PRN
Qty: 20 TABLET | Refills: 0 | OUTPATIENT
Start: 2023-10-24

## 2023-10-25 ENCOUNTER — OFFICE VISIT (OUTPATIENT)
Dept: FAMILY MEDICINE CLINIC | Facility: CLINIC | Age: 48
End: 2023-10-25
Payer: MEDICARE

## 2023-10-25 VITALS
BODY MASS INDEX: 42.43 KG/M2 | HEIGHT: 66 IN | OXYGEN SATURATION: 98 % | DIASTOLIC BLOOD PRESSURE: 76 MMHG | WEIGHT: 264 LBS | TEMPERATURE: 98 F | SYSTOLIC BLOOD PRESSURE: 116 MMHG | HEART RATE: 76 BPM

## 2023-10-25 DIAGNOSIS — M25.561 CHRONIC PAIN OF BOTH KNEES: ICD-10-CM

## 2023-10-25 DIAGNOSIS — M17.0 OSTEOARTHRITIS OF BOTH KNEES, UNSPECIFIED OSTEOARTHRITIS TYPE: ICD-10-CM

## 2023-10-25 DIAGNOSIS — M25.562 CHRONIC PAIN OF BOTH KNEES: ICD-10-CM

## 2023-10-25 DIAGNOSIS — G89.29 CHRONIC PAIN OF BOTH KNEES: ICD-10-CM

## 2023-10-25 RX ORDER — HYDROCODONE BITARTRATE AND ACETAMINOPHEN 10; 325 MG/1; MG/1
1 TABLET ORAL EVERY 6 HOURS PRN
Qty: 20 TABLET | Refills: 0 | Status: SHIPPED | OUTPATIENT
Start: 2023-10-25

## 2023-10-25 RX ORDER — PRAZOSIN HYDROCHLORIDE 5 MG/1
5 CAPSULE ORAL NIGHTLY
COMMUNITY
Start: 2023-10-23

## 2023-10-25 RX ORDER — ZIPRASIDONE HYDROCHLORIDE 20 MG/1
20 CAPSULE ORAL DAILY
COMMUNITY
Start: 2023-10-23

## 2023-10-25 NOTE — PROGRESS NOTES
"Chief Complaint  Obesity (Ozempic denied by insurance )    Subjective        Aurora Bull presents to [facility]  History of Present Illness  Aurora Bull is a 48 y.o. female who presents to clinic today to follow-up on obesity.  Patient was last seen on 10/5/2023.  She was prescribed semaglutide.  Patient is unable to take part in exercise due to chronic knee pain.  Ozempic was denied by her insurance company.  She is also in need of a refill of her Norco.     Review of Systems   Constitutional:  Negative for chills, fatigue and fever.   Musculoskeletal:  Positive for arthralgias.       Objective   Vital Signs:  /76   Pulse 76   Temp 98 °F (36.7 °C)   Ht 167.6 cm (66\")   Wt 120 kg (264 lb)   SpO2 98%   BMI 42.61 kg/m²   Estimated body mass index is 42.61 kg/m² as calculated from the following:    Height as of this encounter: 167.6 cm (66\").    Weight as of this encounter: 120 kg (264 lb).           Physical Exam  Vitals reviewed.   Constitutional:       General: She is not in acute distress.     Appearance: Normal appearance. She is obese. She is not ill-appearing, toxic-appearing or diaphoretic.   HENT:      Head: Normocephalic and atraumatic.   Eyes:      General: No scleral icterus.        Right eye: No discharge.         Left eye: No discharge.   Pulmonary:      Effort: Pulmonary effort is normal. No respiratory distress.   Neurological:      General: No focal deficit present.      Mental Status: She is alert and oriented to person, place, and time.      Motor: No weakness.      Gait: Gait normal.   Psychiatric:         Mood and Affect: Mood normal.         Behavior: Behavior normal.        Result Review :                   Assessment and Plan   Diagnoses and all orders for this visit:    1. Body mass index 40.0-44.9, adult (Primary)  -     Semaglutide-Weight Management 0.25 MG/0.5ML solution auto-injector; Inject 0.25 mg under the skin into the appropriate area " as directed 1 (One) Time Per Week.  Dispense: 2 mL; Refill: 0    2. Osteoarthritis of both knees, unspecified osteoarthritis type  -     HYDROcodone-acetaminophen (NORCO)  MG per tablet; Take 1 tablet by mouth Every 6 (Six) Hours As Needed for Moderate Pain.  Dispense: 20 tablet; Refill: 0    3. Chronic pain of both knees  -     HYDROcodone-acetaminophen (NORCO)  MG per tablet; Take 1 tablet by mouth Every 6 (Six) Hours As Needed for Moderate Pain.  Dispense: 20 tablet; Refill: 0      We will try to send in Wegovy.  We discussed Saxenda if Wegovy is denied.  Weight loss is medically necessary due to severity of degenerative disease of osteoarthritis of her knees.  Edgardo reviewed.  [place] last filled 9/27/2023 for quantity of 20, 5-day supply.  [place] drug screen and controlled substance contract up-to-date.         Follow Up   Return if symptoms worsen or fail to improve.  Patient was given instructions and counseling regarding her condition or for health maintenance advice. Please see specific information pulled into the AVS if appropriate.     Electronically signed by MJ Valadez, 10/25/23, 1:39 PM EDT.

## 2023-11-01 DIAGNOSIS — Z13.0 SCREENING FOR DEFICIENCY ANEMIA: Primary | ICD-10-CM

## 2023-11-01 DIAGNOSIS — Z13.29 SCREENING FOR ENDOCRINE, METABOLIC AND IMMUNITY DISORDER: ICD-10-CM

## 2023-11-01 DIAGNOSIS — Z13.228 SCREENING FOR ENDOCRINE, METABOLIC AND IMMUNITY DISORDER: ICD-10-CM

## 2023-11-01 DIAGNOSIS — Z13.29 SCREENING FOR THYROID DISORDER: ICD-10-CM

## 2023-11-01 DIAGNOSIS — Z13.220 SCREENING FOR LIPID DISORDERS: ICD-10-CM

## 2023-11-01 DIAGNOSIS — Z13.0 SCREENING FOR ENDOCRINE, METABOLIC AND IMMUNITY DISORDER: ICD-10-CM

## 2023-11-01 DIAGNOSIS — Z11.59 NEED FOR HEPATITIS C SCREENING TEST: ICD-10-CM

## 2023-11-10 DIAGNOSIS — M17.0 OSTEOARTHRITIS OF BOTH KNEES, UNSPECIFIED OSTEOARTHRITIS TYPE: ICD-10-CM

## 2023-11-10 DIAGNOSIS — M25.561 CHRONIC PAIN OF BOTH KNEES: ICD-10-CM

## 2023-11-10 DIAGNOSIS — M25.562 CHRONIC PAIN OF BOTH KNEES: ICD-10-CM

## 2023-11-10 DIAGNOSIS — G89.29 CHRONIC PAIN OF BOTH KNEES: ICD-10-CM

## 2023-11-10 RX ORDER — HYDROCODONE BITARTRATE AND ACETAMINOPHEN 10; 325 MG/1; MG/1
1 TABLET ORAL EVERY 6 HOURS PRN
Qty: 20 TABLET | Refills: 0 | Status: SHIPPED | OUTPATIENT
Start: 2023-11-10

## 2023-11-10 NOTE — TELEPHONE ENCOUNTER
Caller: TAISHAJESUSNANI    Relationship: Father    Best call back number: [phone]     Requested Prescriptions:   Requested Prescriptions     Pending Prescriptions Disp Refills    HYDROcodone-acetaminophen (NORCO)  MG per tablet 20 tablet 0     Sig: Take 1 tablet by mouth Every 6 (Six) Hours As Needed for Moderate Pain.        Pharmacy where request should be sent: [facility] #[id number] [address] AT [place] [phone] [facility] [phone]      Last office visit with prescribing clinician: 10/25/2023   Last telemedicine visit with prescribing clinician: Visit date not found   Next office visit with prescribing clinician: 11/15/2023     Additional details provided by patient: PATIENT IS OUT OF MEDICATION. PATIENT'S FATHER STATED THAT PATIENT WOULD LIKE TO CONTINUE GETTING THIS PRESCRIPTION ONLY AT THE [facility] LISTED ABOVE. PLEASE ADVISE.     Does the patient have less than a 3 day supply:  [x] Yes  [] No    Would you like a call back once the refill request has been completed: [x] Yes [x] No    If the office needs to give you a call back, can they leave a voicemail: [] Yes [x] No    Adina Laguna Rep   11/10/23 12:28 EST

## 2023-11-10 NOTE — TELEPHONE ENCOUNTER
Rx Refill Note  Requested Prescriptions     Pending Prescriptions Disp Refills    HYDROcodone-acetaminophen (NORCO)  MG per tablet 20 tablet 0     Sig: Take 1 tablet by mouth Every 6 (Six) Hours As Needed for Moderate Pain.      Last office visit with prescribing clinician: 10/25/2023   Last telemedicine visit with prescribing clinician: Visit date not found   Next office visit with prescribing clinician: 11/15/2023                         Would you like a call back once the refill request has been completed: [] Yes [] No    If the office needs to give you a call back, can they leave a voicemail: [] Yes [] No    Shar Cruz CMA/LMR  11/10/23, 12:34 EST

## 2023-11-13 NOTE — PROGRESS NOTES
"Chief Complaint  Obesity (6W fu - Ozempic and Wegovy denied by insurance  )    Subjective        Aurora Bull presents to [facility]  History of Present Illness  Aurora Bull is a 48 y.o. female who presents to the clinic today to follow-up on obesity.  Ozempic and Wegovy were both denied by her insurance.  Patient's ability to exercise is severely limited due to her chronic knee pain.  She is also having some muscle pain related to her chronic knee pain.    Review of Systems   Constitutional:  Negative for chills, fatigue and fever.   Musculoskeletal:  Positive for arthralgias and myalgias.       Objective   Vital Signs:  /76   Pulse 95   Temp 98 °F (36.7 °C)   Ht 167.6 cm (65.98\")   Wt 120 kg (264 lb)   SpO2 97%   BMI 42.63 kg/m²   Estimated body mass index is 42.63 kg/m² as calculated from the following:    Height as of this encounter: 167.6 cm (65.98\").    Weight as of this encounter: 120 kg (264 lb).          Physical Exam  Vitals reviewed.   Constitutional:       General: She is not in acute distress.     Appearance: Normal appearance. She is obese. She is not ill-appearing, toxic-appearing or diaphoretic.   HENT:      Head: Normocephalic and atraumatic.   Eyes:      General: No scleral icterus.        Right eye: No discharge.         Left eye: No discharge.      Extraocular Movements: Extraocular movements intact.   Neurological:      General: No focal deficit present.      Mental Status: She is alert and oriented to person, place, and time.      Motor: No weakness.      Gait: Gait normal.   Psychiatric:         Mood and Affect: Mood normal.         Behavior: Behavior normal.        Result Review :                   Assessment and Plan   Diagnoses and all orders for this visit:    1. Class 3 severe obesity with serious comorbidity and body mass index (BMI) of 40.0 to 44.9 in adult, unspecified obesity type (Primary)    2. Need for vaccination  -     Fluzone >6 " Months ([id number])    3. Osteoarthritis of both knees, unspecified osteoarthritis type    4. Muscle cramps  -     vitamin B-12 (CYANOCOBALAMIN) 500 MCG tablet; Take 1 tablet by mouth Daily.  Dispense: 30 tablet; Refill: 0  -     cyclobenzaprine (FLEXERIL) 10 MG tablet; Take 0.5 tablets by mouth 3 (Three) Times a Day As Needed for Muscle Spasms (Pain).  Dispense: 30 tablet; Refill: 0      Wegovy and Ozempic were denied by insurance.  Would not recommend Contrave or phentermine given her current medications and history.  We discussed referral to [facility].  Patient is in agreement.  Referral has been placed today.  Flexeril prescribed for muscle cramps.  Patient has taken this in the past and is aware of risk of drowsiness.  Labs discussed today.  Patient advised to avoid NSAIDs given kidney function.       Follow Up   Return if symptoms worsen or fail to improve.  Patient was given instructions and counseling regarding her condition or for health maintenance advice. Please see specific information pulled into the AVS if appropriate.       Electronically signed by MJ Valadez, 11/16/23, 7:40 AM EST.

## 2023-11-15 ENCOUNTER — OFFICE VISIT (OUTPATIENT)
Dept: FAMILY MEDICINE CLINIC | Facility: CLINIC | Age: 48
End: 2023-11-15
Payer: MEDICARE

## 2023-11-15 VITALS
SYSTOLIC BLOOD PRESSURE: 110 MMHG | BODY MASS INDEX: 42.43 KG/M2 | DIASTOLIC BLOOD PRESSURE: 76 MMHG | WEIGHT: 264 LBS | OXYGEN SATURATION: 97 % | HEIGHT: 66 IN | HEART RATE: 95 BPM | TEMPERATURE: 98 F

## 2023-11-15 DIAGNOSIS — R25.2 MUSCLE CRAMPS: ICD-10-CM

## 2023-11-15 DIAGNOSIS — M17.0 OSTEOARTHRITIS OF BOTH KNEES, UNSPECIFIED OSTEOARTHRITIS TYPE: ICD-10-CM

## 2023-11-15 DIAGNOSIS — Z23 NEED FOR VACCINATION: ICD-10-CM

## 2023-11-15 DIAGNOSIS — E66.01 CLASS 3 SEVERE OBESITY WITH SERIOUS COMORBIDITY AND BODY MASS INDEX (BMI) OF 40.0 TO 44.9 IN ADULT, UNSPECIFIED OBESITY TYPE: Primary | ICD-10-CM

## 2023-11-15 PROCEDURE — 1159F MED LIST DOCD IN RCRD: CPT | Performed by: NURSE PRACTITIONER

## 2023-11-15 PROCEDURE — 90686 IIV4 VACC NO PRSV 0.5 ML IM: CPT | Performed by: NURSE PRACTITIONER

## 2023-11-15 PROCEDURE — 1160F RVW MEDS BY RX/DR IN RCRD: CPT | Performed by: NURSE PRACTITIONER

## 2023-11-15 PROCEDURE — 99213 OFFICE O/P EST LOW 20 MIN: CPT | Performed by: NURSE PRACTITIONER

## 2023-11-15 PROCEDURE — G0008 ADMIN INFLUENZA VIRUS VAC: HCPCS | Performed by: NURSE PRACTITIONER

## 2023-11-15 RX ORDER — CHOLECALCIFEROL (VITAMIN D3) 125 MCG
500 CAPSULE ORAL DAILY
Qty: 30 TABLET | Refills: 0 | Status: SHIPPED | OUTPATIENT
Start: 2023-11-15

## 2023-11-15 RX ORDER — CYCLOBENZAPRINE HCL 10 MG
5 TABLET ORAL 3 TIMES DAILY PRN
Qty: 30 TABLET | Refills: 0 | Status: SHIPPED | OUTPATIENT
Start: 2023-11-15

## 2023-12-17 DIAGNOSIS — M25.561 CHRONIC PAIN OF BOTH KNEES: ICD-10-CM

## 2023-12-17 DIAGNOSIS — M25.562 CHRONIC PAIN OF BOTH KNEES: ICD-10-CM

## 2023-12-17 DIAGNOSIS — M17.0 OSTEOARTHRITIS OF BOTH KNEES, UNSPECIFIED OSTEOARTHRITIS TYPE: ICD-10-CM

## 2023-12-17 DIAGNOSIS — G89.29 CHRONIC PAIN OF BOTH KNEES: ICD-10-CM

## 2023-12-18 RX ORDER — HYDROCODONE BITARTRATE AND ACETAMINOPHEN 10; 325 MG/1; MG/1
1 TABLET ORAL EVERY 6 HOURS PRN
Qty: 20 TABLET | Refills: 0 | Status: SHIPPED | OUTPATIENT
Start: 2023-12-18

## 2023-12-18 NOTE — TELEPHONE ENCOUNTER
Please fill while Sonia is out of the office  UDS 7/5/23  CSA 7/5/23  Rx Refill Note  Requested Prescriptions     Pending Prescriptions Disp Refills    HYDROcodone-acetaminophen (NORCO)  MG per tablet 20 tablet 0     Sig: Take 1 tablet by mouth Every 6 (Six) Hours As Needed for Moderate Pain.      Last office visit with prescribing clinician: 11/15/2023   Last telemedicine visit with prescribing clinician: Visit date not found   Next office visit with prescribing clinician: 2/15/2024                         Would you like a call back once the refill request has been completed: [] Yes [] No    If the office needs to give you a call back, can they leave a voicemail: [] Yes [] No    Seema Cabrales CMA/LMR  12/18/23, 08:00 EST

## 2024-01-03 DIAGNOSIS — M25.562 CHRONIC PAIN OF BOTH KNEES: ICD-10-CM

## 2024-01-03 DIAGNOSIS — M25.561 CHRONIC PAIN OF BOTH KNEES: ICD-10-CM

## 2024-01-03 DIAGNOSIS — M17.0 OSTEOARTHRITIS OF BOTH KNEES, UNSPECIFIED OSTEOARTHRITIS TYPE: ICD-10-CM

## 2024-01-03 DIAGNOSIS — G89.29 CHRONIC PAIN OF BOTH KNEES: ICD-10-CM

## 2024-01-04 RX ORDER — HYDROCODONE BITARTRATE AND ACETAMINOPHEN 10; 325 MG/1; MG/1
1 TABLET ORAL EVERY 6 HOURS PRN
Qty: 20 TABLET | Refills: 0 | Status: SHIPPED | OUTPATIENT
Start: 2024-01-04

## 2024-01-04 NOTE — TELEPHONE ENCOUNTER
Rx Refill Note  Requested Prescriptions     Pending Prescriptions Disp Refills    HYDROcodone-acetaminophen (NORCO)  MG per tablet 20 tablet 0     Sig: Take 1 tablet by mouth Every 6 (Six) Hours As Needed for Moderate Pain.      Last office visit with prescribing clinician: Visit date not found   Last telemedicine visit with prescribing clinician: Visit date not found   Next office visit with prescribing clinician: Visit date not found                         Would you like a call back once the refill request has been completed: [] Yes [] No    If the office needs to give you a call back, can they leave a voicemail: [] Yes [] No    Shar Cruz CMA/LMR  01/04/24, 07:37 EST

## 2024-01-08 ENCOUNTER — OFFICE VISIT (OUTPATIENT)
Dept: ORTHOPEDIC SURGERY | Facility: CLINIC | Age: 49
End: 2024-01-08
Payer: MEDICARE

## 2024-01-08 VITALS — WEIGHT: 255.8 LBS | BODY MASS INDEX: 41.11 KG/M2 | HEIGHT: 66 IN | TEMPERATURE: 97.1 F

## 2024-01-08 DIAGNOSIS — M17.11 ARTHRITIS OF RIGHT KNEE: Primary | ICD-10-CM

## 2024-01-08 PROCEDURE — 99214 OFFICE O/P EST MOD 30 MIN: CPT | Performed by: ORTHOPAEDIC SURGERY

## 2024-01-08 RX ORDER — ACETAMINOPHEN 325 MG/1
1000 TABLET ORAL ONCE
OUTPATIENT
Start: 2024-01-08 | End: 2024-01-08

## 2024-01-08 RX ORDER — MELOXICAM 7.5 MG/1
15 TABLET ORAL ONCE
OUTPATIENT
Start: 2024-01-08 | End: 2024-01-08

## 2024-01-08 RX ORDER — CHLORHEXIDINE GLUCONATE 500 MG/1
CLOTH TOPICAL TAKE AS DIRECTED
OUTPATIENT
Start: 2024-01-08

## 2024-01-08 RX ORDER — PREGABALIN 75 MG/1
75 CAPSULE ORAL ONCE
OUTPATIENT
Start: 2024-01-08 | End: 2024-01-08

## 2024-01-08 NOTE — PROGRESS NOTES
Patient Name: Aurora Bull   YOB: 1975  Referring Primary Care Physician: Sonia Christiansen APRN  BMI: Body mass index is 41.29 kg/m².    Chief Complaint:    Chief Complaint   Patient presents with    Right Knee - Follow-up        HPI:     Aurora Bull is a 48 y.o. female who presents today for evaluation of   Chief Complaint   Patient presents with    Right Knee - Follow-up   .  Aurora is seen today complaining of right knee pain has been really hurting her for the last few months.  She had cortisone injection in the past and did not really help.  She cannot take anti-inflammatories as she says she took a lot of ibuprofen and she was having side effects and she is better now but does not want to take it.  Current BMI is 41 she is on hydrocodone for pain control from her family doctor.  She saw pain management they talked about doing an ablation on her.  Reviewing her chart she has a history of anxiety and schizoaffective disorder but she says she gets regular therapy is good a good support system and has been taking her meds.      Subjective   Medications:   Home Medications:  Current Outpatient Medications on File Prior to Visit   Medication Sig    clonazePAM (KlonoPIN) 1 MG tablet Take 1 tablet by mouth 2 (Two) Times a Day.    cyclobenzaprine (FLEXERIL) 10 MG tablet Take 0.5 tablets by mouth 3 (Three) Times a Day As Needed for Muscle Spasms (Pain).    dicyclomine (BENTYL) 20 MG tablet Take 1 tablet by mouth 4 (Four) Times a Day As Needed (diarrhea).    fluvoxaMINE (LUVOX) 100 MG tablet Take 1 tablet by mouth 2 (Two) Times a Day.    HYDROcodone-acetaminophen (NORCO)  MG per tablet Take 1 tablet by mouth Every 6 (Six) Hours As Needed for Moderate Pain.    ipratropium-albuterol (COMBIVENT RESPIMAT)  MCG/ACT inhaler Inhale 1 puff 4 (Four) Times a Day As Needed for Wheezing.    lamoTRIgine (LaMICtal) 150 MG tablet Take 1 tablet by mouth Daily.    OLANZapine zydis (zyPREXA) 5 MG  disintegrating tablet Place 1 tablet on the tongue Every Night. dissolve one tablet by mouth for increase psychosis may repeat in 30 minutes if needed. Limit 2 tablets a day    prazosin (MINIPRESS) 5 MG capsule Take 1 capsule by mouth Every Night.    vitamin B-12 (CYANOCOBALAMIN) 500 MCG tablet Take 1 tablet by mouth Daily.    ziprasidone (GEODON) 20 MG capsule Take 1 capsule by mouth Daily.    ziprasidone (GEODON) 80 MG capsule Take 1 capsule by mouth 2 (Two) Times a Day.     No current facility-administered medications on file prior to visit.     Current Medications:  Scheduled Meds:  Continuous Infusions:No current facility-administered medications for this visit.    PRN Meds:.    I have reviewed the patient's medical history in detail and updated the computerized patient record.  Review and summarization of old records includes:    Past Medical History:   Diagnosis Date    Asthma     Chronic diarrhea     Chronic knee pain     bilateral    Chronic lower back pain     Depression     Dislocated elbow     Kidney stones     Knee swelling     Low back strain 2017    Neck strain 2017    Osgood-Schlatter's disease     PTSD (post-traumatic stress disorder)     Schizoaffective disorder         Past Surgical History:   Procedure Laterality Date    ADENOIDECTOMY  1988    CARDIAC CATHETERIZATION  2014     SECTION  2004    CHOLECYSTECTOMY      COLONOSCOPY  2019    COLONOSCOPY N/A 2022    Procedure: COLONOSCOPY, BIOPSIES, POLYPECTOMY;  Surgeon: Jerald Keller MD;  Location: [facility] [place] MAIN OR;  Service: Gastroenterology;  Laterality: N/A;  POLYP X 1    ENDOSCOPY  2018    ENDOSCOPY N/A 2022    Procedure: ESOPHAGOGASTRODUODENOSCOPY WITH BIOPSIES;  Surgeon: Jerald Keller MD;  Location: [facility] [place] [facility] MAIN OR;  Service: Gastroenterology;  Laterality: N/A;  ESOPHAGITIS, HIATAL HERNIA      GASTRIC SLEEVE LAPAROSCOPIC  2014    HYSTERECTOMY  2019    KNEE CARTILAGE SURGERY Right 2015    KNEE  SURGERY Right 2016    scope    NERVE BLOCK Bilateral 3/21/2022    Procedure: KNEE GENICULAR NERVE BLOCK UNDER FLUORO bilateral;  Surgeon: Emily Smith MD;  [place] [facility] [place];  Service: Pain Management;  Laterality: Bilateral;    TONSILLECTOMY  1988    UPPER GASTROINTESTINAL ENDOSCOPY          Social History     Occupational History    Not on file   Tobacco Use    Smoking status: Former     Packs/day: 1.50     Years: 24.00     Additional pack years: 0.00     Total pack years: 36.00     Types: Cigarettes    Smokeless tobacco: Never    Tobacco comments:     Quit in 2014   Vaping Use    Vaping Use: Never used   Substance and Sexual Activity    Alcohol use: Yes     Alcohol/week: 2.0 standard drinks of alcohol     Types: 2 Shots of liquor per week     Comment: 2 shots nights    Drug use: Not Currently    Sexual activity: Yes      Social History     Social History Narrative    Not on file        Family History   Problem Relation Age of Onset    Diabetes Mother     Diabetes Father     Hyperlipidemia Father     Heart disease Father     Hypertension Father     Hyperlipidemia Brother     Diabetes Brother     Heart block Brother     Scoliosis Son     Diabetes Maternal Aunt     Dwarfism Maternal Uncle     Stroke Maternal Uncle     Hypertension Paternal Uncle     Mental illness Maternal Grandmother     Diabetes Maternal Grandmother     Memory loss Maternal Grandmother     Esophageal cancer Maternal Grandfather     Alzheimer's disease Paternal Grandmother     Hypertension Paternal Grandmother     Hypertension Paternal Grandfather     Heart murmur Paternal Grandfather     Seasonal affective disorder Brother     Drug abuse Brother     Mental illness Brother     Alcohol abuse Brother     Colon cancer Neg Hx     Colon polyps Neg Hx     Crohn's disease Neg Hx     Irritable bowel syndrome Neg Hx     Ulcerative colitis Neg Hx        ROS: 14 point review of systems was performed and all other systems were reviewed and are  "negative except for documented findings in HPI and today's encounter.     Allergies: No Known Allergies  Constitutional:  Denies fever, shaking or chills   Eyes:  Denies change in visual acuity   HENT:  Denies nasal congestion or sore throat   Respiratory:  Denies cough or shortness of breath   Cardiovascular:  Denies chest pain or severe LE edema   GI:  Denies abdominal pain, nausea, vomiting, bloody stools or diarrhea   Musculoskeletal:  Numbness, tingling, pain, or loss of motor function only as noted above in history of present illness.  : Denies painful urination or hematuria  Integument:  Denies rash, lesion or ulceration   Neurologic:  Denies headache or focal weakness  Endocrine:  Denies lymphadenopathy  Psych:  Denies confusion or change in mental status   Hem:  Denies active bleeding    OBJECTIVE:  Physical Exam: 48 y.o. female  Wt Readings from Last 3 Encounters:   01/08/24 116 kg (255 lb 12.8 oz)   11/15/23 120 kg (264 lb)   10/25/23 120 kg (264 lb)     Ht Readings from Last 1 Encounters:   01/08/24 167.6 cm (66\")     Body mass index is 41.29 kg/m².  Vitals:    01/08/24 0810   Temp: 97.1 °F (36.2 °C)     Vital signs reviewed.     General Appearance:    Alert, cooperative, in no acute distress                  Eyes: conjunctiva clear  ENT: external ears and nose atraumatic  CV: no peripheral edema  Resp: normal respiratory effort  Skin: no rashes or wounds; normal turgor  Psych: mood and affect appropriate  Lymph: no nodes appreciated  Neuro: gross sensation intact  Vascular:  Palpable peripheral pulse in noted extremity  Musculoskeletal Extremities: Exam today shows she essentially hops her knees very stiff painful and swollen.  She does have joint line tenderness    Radiology:   AP lateral 40 degree PA x-ray right knee taken the office for complaints of pain with comparison views shows advanced end-stage tricompartmental arthritis.  She has cortical irregularity along her femoral condyles and patella " "and has large osteophytes.        Assessment:     ICD-10-CM ICD-9-CM   1. Arthritis of right knee  M17.11 716.96        MDM/Plan:   The diagnosis(es), natural history, pathophysiology and treatment for diagnosis(es) were discussed. Opportunity given and questions answered.  Biomechanics of pertinent body areas discussed.  When appropriate, the use of ambulatory aids discussed.    Biomechanics of pertinent body areas discussed.  When appropriate, the use of ambulatory aids discussed.  BMI:  The concept of BMI body mass index and its importance and implications discussed.    HOME EXERCISE/PT program encouraged  MEDICAL RECORDS reviewed from other provider(s) for past and current medical history pertinent to this complaint.  Total Knee Replacement : Continuation of conservative management vs. TKA: I reviewed anatomy of a total knee arthroplasty in laymen's terms, as well as typical postoperative recovery and possibly 6-12 months for maximal recovery, and possible need for rehabilitation stay after hospitalization. We also discussed risks, benefits, alternatives, and limitations of procedure with risks including but not limited to neurovascular damage, bleeding, infection, malalignment, chronic pain, failure of implants, osteolysis, loosening of implants, loss of motion, weakness, stiffness, instability, DVT, pulmonary embolus, death, stroke, complex regional pain syndrome, myocardial infarction, and need for additional procedures. Concept of substitution vs. replacement discussed.  No guarantees were given regarding results of surgery.  Patient verbalized understanding, and was given the opportunity to ask and have all questions answered today.   I told her she was \"a little young\" but she cannot really walk on the knee anymore she tried therapy injections medicines etc. and she wishes to proceed with total knee replacement.  Her father had had it done here not too long ago.    1/8/2024    Dictated utilizing Francy " dictation

## 2024-01-15 DIAGNOSIS — M25.561 CHRONIC PAIN OF BOTH KNEES: ICD-10-CM

## 2024-01-15 DIAGNOSIS — G89.29 CHRONIC PAIN OF BOTH KNEES: ICD-10-CM

## 2024-01-15 DIAGNOSIS — M17.0 OSTEOARTHRITIS OF BOTH KNEES, UNSPECIFIED OSTEOARTHRITIS TYPE: ICD-10-CM

## 2024-01-15 DIAGNOSIS — M25.562 CHRONIC PAIN OF BOTH KNEES: ICD-10-CM

## 2024-01-15 RX ORDER — HYDROCODONE BITARTRATE AND ACETAMINOPHEN 10; 325 MG/1; MG/1
1 TABLET ORAL EVERY 6 HOURS PRN
Qty: 20 TABLET | Refills: 0 | Status: SHIPPED | OUTPATIENT
Start: 2024-01-15

## 2024-01-15 NOTE — TELEPHONE ENCOUNTER
Rx Refill Note  Requested Prescriptions     Pending Prescriptions Disp Refills    HYDROcodone-acetaminophen (NORCO)  MG per tablet 20 tablet 0     Sig: Take 1 tablet by mouth Every 6 (Six) Hours As Needed for Moderate Pain.      Last office visit with prescribing clinician: 11/15/2023   Last telemedicine visit with prescribing clinician: Visit date not found   Next office visit with prescribing clinician: 2/15/2024                         Would you like a call back once the refill request has been completed: [] Yes [] No    If the office needs to give you a call back, can they leave a voicemail: [] Yes [] No    Shar Cruz CMA/LMR  01/15/24, 12:55 EST

## 2024-02-05 DIAGNOSIS — M25.561 CHRONIC PAIN OF BOTH KNEES: ICD-10-CM

## 2024-02-05 DIAGNOSIS — M17.0 OSTEOARTHRITIS OF BOTH KNEES, UNSPECIFIED OSTEOARTHRITIS TYPE: ICD-10-CM

## 2024-02-05 DIAGNOSIS — G89.29 CHRONIC PAIN OF BOTH KNEES: ICD-10-CM

## 2024-02-05 DIAGNOSIS — M25.562 CHRONIC PAIN OF BOTH KNEES: ICD-10-CM

## 2024-02-05 RX ORDER — HYDROCODONE BITARTRATE AND ACETAMINOPHEN 10; 325 MG/1; MG/1
1 TABLET ORAL EVERY 6 HOURS PRN
Qty: 20 TABLET | Refills: 0 | Status: SHIPPED | OUTPATIENT
Start: 2024-02-05

## 2024-02-05 NOTE — TELEPHONE ENCOUNTER
Rx Refill Note  Requested Prescriptions     Pending Prescriptions Disp Refills    HYDROcodone-acetaminophen (NORCO)  MG per tablet 20 tablet 0     Sig: Take 1 tablet by mouth Every 6 (Six) Hours As Needed for Moderate Pain.      Last office visit with prescribing clinician: 11/15/2023   Last telemedicine visit with prescribing clinician: Visit date not found   Next office visit with prescribing clinician: 2/15/2024                         Would you like a call back once the refill request has been completed: [] Yes [] No    If the office needs to give you a call back, can they leave a voicemail: [] Yes [] No    Shar Cruz CMA/LMR  02/05/24, 11:55 EST

## 2024-02-22 ENCOUNTER — OFFICE VISIT (OUTPATIENT)
Dept: FAMILY MEDICINE CLINIC | Facility: CLINIC | Age: 49
End: 2024-02-22
Payer: MEDICARE

## 2024-02-22 VITALS
WEIGHT: 252 LBS | TEMPERATURE: 97.5 F | DIASTOLIC BLOOD PRESSURE: 78 MMHG | BODY MASS INDEX: 40.5 KG/M2 | HEIGHT: 66 IN | OXYGEN SATURATION: 97 % | HEART RATE: 81 BPM | SYSTOLIC BLOOD PRESSURE: 116 MMHG

## 2024-02-22 DIAGNOSIS — M17.0 OSTEOARTHRITIS OF BOTH KNEES, UNSPECIFIED OSTEOARTHRITIS TYPE: Primary | ICD-10-CM

## 2024-02-22 DIAGNOSIS — R25.2 MUSCLE CRAMPS: ICD-10-CM

## 2024-02-22 PROCEDURE — 99213 OFFICE O/P EST LOW 20 MIN: CPT | Performed by: NURSE PRACTITIONER

## 2024-02-22 PROCEDURE — 1160F RVW MEDS BY RX/DR IN RCRD: CPT | Performed by: NURSE PRACTITIONER

## 2024-02-22 PROCEDURE — 1159F MED LIST DOCD IN RCRD: CPT | Performed by: NURSE PRACTITIONER

## 2024-02-22 RX ORDER — FLUVOXAMINE MALEATE 50 MG/1
50 TABLET, COATED ORAL NIGHTLY
COMMUNITY
Start: 2023-11-15

## 2024-02-22 RX ORDER — CHOLECALCIFEROL (VITAMIN D3) 125 MCG
500 CAPSULE ORAL DAILY
Qty: 90 TABLET | Refills: 1 | Status: SHIPPED | OUTPATIENT
Start: 2024-02-22

## 2024-02-22 NOTE — PROGRESS NOTES
"Chief Complaint  Osteoarthritis (3M fu)    Subjective        Aurora Bull presents to [facility]  History of Present Illness  Aurora Bull is a 48 y.o. female who presents to the clinic today for her routine 3 month follow-up appointment for osteoarthritis pain management.  Patient has osteoarthritis of both knees.  She has tried physical therapy and steroid injections. She cannot tolerate NSAIDs due to impact on kidney function. She has seen pain management. Her pain has progressively worsened over the last 3 months. She is following with orthopedic surgery.  Her last appointment with this specialty was 1/8/2024 and plan for right knee replacement was discussed. She will have right knee replacement in March. She is taking the hydrocodone-acetaminophen as needed at least once a day and denies adverse side effects.  Her knee pain severely limits her ability to exercise.  She is watching her diet and exercising her upper body.  She continues to follow with her psychiatrist as advised.  Schizoaffective disorder well-controlled.      Objective   Vital Signs:  /78   Pulse 81   Temp 97.5 °F (36.4 °C)   Ht 167.6 cm (65.98\")   Wt 114 kg (252 lb)   SpO2 97%   BMI 40.69 kg/m²   Estimated body mass index is 40.69 kg/m² as calculated from the following:    Height as of this encounter: 167.6 cm (65.98\").    Weight as of this encounter: 114 kg (252 lb).           Physical Exam  Vitals reviewed.   Constitutional:       General: She is not in acute distress.     Appearance: Normal appearance. She is obese. She is not ill-appearing, toxic-appearing or diaphoretic.   HENT:      Head: Normocephalic and atraumatic.   Pulmonary:      Effort: Pulmonary effort is normal. No respiratory distress.   Musculoskeletal:      Comments: Ambulates with cane   Neurological:      General: No focal deficit present.      Mental Status: She is alert and oriented to person, place, and time.      Motor: " Weakness present.      Gait: Gait abnormal.        Result Review :          [employer] Select 13 Discrete - (07/05/2023 15:00)   CONTROLLED SUBSTANCE AGREEMENT - SCAN - KHADIJAH, OSTEOARTHRITIS, [id number] (07/05/2023)          Assessment and Plan     Diagnoses and all orders for this visit:    1. Osteoarthritis of both knees, unspecified osteoarthritis type (Primary)  Assessment & Plan:  Knee pain worsening.  Plan for right knee replacement in March with orthopedic surgery.  Patient is already scheduled for preprocedure testing.  Continue hydrocodone-acetaminophen  mg every 6 hours as needed. She is taking only as needed for severe pain.   Refill upon request.  Controlled substance contract and urine drug screen up-to-date.          2. Muscle cramps  -     vitamin B-12 (CYANOCOBALAMIN) 500 MCG tablet; Take 1 tablet by mouth Daily.  Dispense: 90 tablet; Refill: 1             Follow Up     Return in about 3 months (around 5/22/2024), or if symptoms worsen or fail to improve, for Recheck.  Patient was given instructions and counseling regarding her condition or for health maintenance advice. Please see specific information pulled into the AVS if appropriate.       Electronically signed by MJ Valadez, 02/22/24, 12:57 PM EST.

## 2024-02-22 NOTE — ASSESSMENT & PLAN NOTE
Knee pain worsening.  Plan for right knee replacement in March with orthopedic surgery.  Patient is already scheduled for preprocedure testing.  Continue hydrocodone-acetaminophen  mg every 6 hours as needed. She is taking only as needed for severe pain.   Refill upon request.  Controlled substance contract and urine drug screen up-to-date.

## 2024-02-27 DIAGNOSIS — M17.0 OSTEOARTHRITIS OF BOTH KNEES, UNSPECIFIED OSTEOARTHRITIS TYPE: ICD-10-CM

## 2024-02-27 DIAGNOSIS — M25.562 CHRONIC PAIN OF BOTH KNEES: ICD-10-CM

## 2024-02-27 DIAGNOSIS — M25.561 CHRONIC PAIN OF BOTH KNEES: ICD-10-CM

## 2024-02-27 DIAGNOSIS — G89.29 CHRONIC PAIN OF BOTH KNEES: ICD-10-CM

## 2024-02-27 NOTE — TELEPHONE ENCOUNTER
Rx Refill Note  Requested Prescriptions     Pending Prescriptions Disp Refills    HYDROcodone-acetaminophen (NORCO)  MG per tablet 20 tablet 0     Sig: Take 1 tablet by mouth Every 6 (Six) Hours As Needed for Moderate Pain.      Last office visit with prescribing clinician: 2/22/2024   Last telemedicine visit with prescribing clinician: Visit date not found   Next office visit with prescribing clinician: 5/22/2024                         Would you like a call back once the refill request has been completed: [] Yes [] No    If the office needs to give you a call back, can they leave a voicemail: [] Yes [] No    Shar Cruz CMA/LMR  02/27/24, 11:49 EST

## 2024-02-28 RX ORDER — HYDROCODONE BITARTRATE AND ACETAMINOPHEN 10; 325 MG/1; MG/1
1 TABLET ORAL EVERY 6 HOURS PRN
Qty: 20 TABLET | Refills: 0 | Status: SHIPPED | OUTPATIENT
Start: 2024-02-28

## 2024-03-05 ENCOUNTER — PRE-ADMISSION TESTING (OUTPATIENT)
Dept: PREADMISSION TESTING | Facility: HOSPITAL | Age: 49
End: 2024-03-05
Payer: MEDICARE

## 2024-03-05 VITALS
RESPIRATION RATE: 18 BRPM | HEIGHT: 65 IN | WEIGHT: 248.5 LBS | TEMPERATURE: 97.5 F | DIASTOLIC BLOOD PRESSURE: 78 MMHG | SYSTOLIC BLOOD PRESSURE: 120 MMHG | OXYGEN SATURATION: 96 % | HEART RATE: 83 BPM | BODY MASS INDEX: 41.4 KG/M2

## 2024-03-05 LAB
ANION GAP SERPL CALCULATED.3IONS-SCNC: 8.9 MMOL/L (ref 5–15)
BUN SERPL-MCNC: 13 MG/DL (ref 6–20)
BUN/CREAT SERPL: 12.4 (ref 7–25)
CALCIUM SPEC-SCNC: 9.1 MG/DL (ref 8.6–10.5)
CHLORIDE SERPL-SCNC: 105 MMOL/L (ref 98–107)
CO2 SERPL-SCNC: 27.1 MMOL/L (ref 22–29)
CREAT SERPL-MCNC: 1.05 MG/DL (ref 0.57–1)
DEPRECATED RDW RBC AUTO: 38.4 FL (ref 37–54)
EGFRCR SERPLBLD CKD-EPI 2021: 66.1 ML/MIN/1.73
ERYTHROCYTE [DISTWIDTH] IN BLOOD BY AUTOMATED COUNT: 12.4 % (ref 12.3–15.4)
GLUCOSE SERPL-MCNC: 87 MG/DL (ref 65–99)
HCT VFR BLD AUTO: 39.8 % (ref 34–46.6)
HGB BLD-MCNC: 13.2 G/DL (ref 12–15.9)
MCH RBC QN AUTO: 28.4 PG (ref 26.6–33)
MCHC RBC AUTO-ENTMCNC: 33.2 G/DL (ref 31.5–35.7)
MCV RBC AUTO: 85.8 FL (ref 79–97)
PLATELET # BLD AUTO: 194 10*3/MM3 (ref 140–450)
PMV BLD AUTO: 11.1 FL (ref 6–12)
POTASSIUM SERPL-SCNC: 4.6 MMOL/L (ref 3.5–5.2)
QT INTERVAL: 425 MS
QTC INTERVAL: 449 MS
RBC # BLD AUTO: 4.64 10*6/MM3 (ref 3.77–5.28)
SODIUM SERPL-SCNC: 141 MMOL/L (ref 136–145)
WBC NRBC COR # BLD AUTO: 4.86 10*3/MM3 (ref 3.4–10.8)

## 2024-03-05 PROCEDURE — 36415 COLL VENOUS BLD VENIPUNCTURE: CPT

## 2024-03-05 PROCEDURE — 93005 ELECTROCARDIOGRAM TRACING: CPT

## 2024-03-05 PROCEDURE — 85027 COMPLETE CBC AUTOMATED: CPT

## 2024-03-05 PROCEDURE — 80048 BASIC METABOLIC PNL TOTAL CA: CPT

## 2024-03-05 NOTE — DISCHARGE INSTRUCTIONS
Take the following medications the morning of surgery: FLUVOXAMINE AND ZIPRASIDONE    BRING INHALER WITH YOU    ARRIVAL TIME IS TO BE DETERMINED. YOU WILL BE CALLED THE DAY PRIOR WITH YOUR ARRIVAL TIME      If you are on prescription narcotic pain medication to control your pain you may also take that medication the morning of surgery.    General Instructions:  Do not eat solid food after midnight the night before surgery.  You may drink clear liquids day of surgery but must stop at least one hour before your hospital arrival time.  It is beneficial for you to have a clear drink that contains carbohydrates the day of surgery.  We suggest a 12 to 20 ounce bottle of Gatorade or Powerade for non-diabetic patients or a 12 to 20 ounce bottle of G2 or Powerade Zero for diabetic patients. (Pediatric patients, are not advised to drink a 12 to 20 ounce carbohydrate drink)    Clear liquids are liquids you can see through.  Nothing red in color.     Plain water                               Sports drinks  Sodas                                   Gelatin (Jell-O)  Fruit juices without pulp such as white grape juice and apple juice  Popsicles that contain no fruit or yogurt  Tea or coffee (no cream or milk added)  Gatorade / Powerade  G2 / Powerade Zero    Patients who avoid smoking, chewing tobacco and alcohol for 4 weeks prior to surgery have a reduced risk of post-operative complications.  Quit smoking as many days before surgery as you can.  Do not smoke, use chewing tobacco or drink alcohol the day of surgery.   If applicable bring your C-PAP/ BI-PAP machine in with you to preop day of surgery.  Bring any papers given to you in the doctor’s office.  Wear clean comfortable clothes.  Do not wear contact lenses, false eyelashes or make-up.  Bring a case for your glasses.   Bring crutches or walker if applicable.  Remove all piercings.  Leave jewelry and any other valuables at home.  Hair extensions with metal clips must be  removed prior to surgery.  The Pre-Admission Testing nurse will instruct you to bring medications if unable to obtain an accurate list in Pre-Admission Testing.            Preventing a Surgical Site Infection:  For 2 to 3 days before surgery, avoid shaving with a razor because the razor can irritate skin and make it easier to develop an infection.    Any areas of open skin can increase the risk of a post-operative wound infection by allowing bacteria to enter and travel throughout the body.  Notify your surgeon if you have any skin wounds / rashes even if it is not near the expected surgical site.  The area will need assessed to determine if surgery should be delayed until it is healed.  The night prior to surgery shower using a fresh bar of anti-bacterial soap (such as Dial) and clean washcloth.  Sleep in a clean bed with clean clothing.  Do not allow pets to sleep with you.  Shower on the morning of surgery using a fresh bar of anti-bacterial soap (such as Dial) and clean washcloth.  Dry with a clean towel and dress in clean clothing.  Ask your surgeon if you will be receiving antibiotics prior to surgery.  Make sure you, your family, and all healthcare providers clean their hands with soap and water or an alcohol based hand  before caring for you or your wound.      CHLORHEXIDINE CLOTH INSTRUCTIONS  The morning of surgery follow these instructions using the Chlorhexidine cloths you've been given.  These steps reduce bacteria on the body.  Do not use the cloths near your eyes, ears mouth, genitalia or on open wounds.  Throw the cloths away after use but do not try to flush them down a toilet.      Open and remove one cloth at a time from the package.    Leave the cloth unfolded and begin the bathing.  Massage the skin with the cloths using gentle pressure to remove bacteria.  Do not scrub harshly.   Follow the steps below with one 2% CHG cloth per area (6 total cloths).  One cloth for neck, shoulders and  chest.  One cloth for both arms, hands, fingers and underarms (do underarms last).  One cloth for the abdomen followed by groin.  One cloth for right leg and foot including between the toes.  One cloth for left leg and foot including between the toes.  The last cloth is to be used for the back of the neck, back and buttocks.    Allow the CHG to air dry 3 minutes on the skin which will give it time to work and decrease the chance of irritation.  The skin may feel sticky until it is dry.  Do not rinse with water or any other liquid or you will lose the beneficial effects of the CHG.  If mild skin irritation occurs, do rinse the skin to remove the CHG.  Report this to the nurse at time of admission.  Do not apply lotions, creams, ointments, deodorants or perfumes after using the clothes. Dress in clean clothes before coming to the hospital.        Day of surgery:  Your arrival time is approximately two hours before your scheduled surgery time.  Upon arrival, a Pre-op nurse and Anesthesiologist will review your health history, obtain vital signs, and answer questions you may have.  The only belongings needed at this time will be a list of your home medications and if applicable your C-PAP/BI-PAP machine.  A Pre-op nurse will start an IV and you may receive medication in preparation for surgery, including something to help you relax.     Please be aware that surgery does come with discomfort.  We want to make every effort to control your discomfort so please discuss any uncontrolled symptoms with your nurse.   Your doctor will most likely have prescribed pain medications.      If you are going home after surgery you will receive individualized written care instructions before being discharged.  A responsible adult must drive you to and from the hospital on the day of your surgery and ideally stay with you through the night.   .  Discharge prescriptions can be filled by the hospital pharmacy during regular pharmacy hours.   If you are having surgery late in the day/evening your prescription may be e-prescribed to your pharmacy.  Please verify your pharmacy hours or chose a 24 hour pharmacy to avoid not having access to your prescription because your pharmacy has closed for the day.    If you are staying overnight following surgery, you will be transported to your hospital room following the recovery period.  [facility] has all private rooms.    If you have any questions please call Pre-Admission Testing at [phone].  Deductibles and co-payments are collected on the day of service. Please be prepared to pay the required co-pay, deductible or deposit on the day of service as defined by your plan.    Call your surgeon immediately if you experience any of the following symptoms:  Sore Throat  Shortness of Breath or difficulty breathing  Cough  Chills  Body soreness or muscle pain  Headache  Fever  New loss of taste or smell  Do not arrive for your surgery ill.  Your procedure will need to be rescheduled to another time.  You will need to call your physician before the day of surgery to avoid any unnecessary exposure to hospital staff as well as other patients.

## 2024-03-08 DIAGNOSIS — M25.562 CHRONIC PAIN OF BOTH KNEES: ICD-10-CM

## 2024-03-08 DIAGNOSIS — M25.561 CHRONIC PAIN OF BOTH KNEES: ICD-10-CM

## 2024-03-08 DIAGNOSIS — G89.29 CHRONIC PAIN OF BOTH KNEES: ICD-10-CM

## 2024-03-08 DIAGNOSIS — M17.0 OSTEOARTHRITIS OF BOTH KNEES, UNSPECIFIED OSTEOARTHRITIS TYPE: ICD-10-CM

## 2024-03-11 RX ORDER — HYDROCODONE BITARTRATE AND ACETAMINOPHEN 10; 325 MG/1; MG/1
1 TABLET ORAL EVERY 6 HOURS PRN
Qty: 20 TABLET | Refills: 0 | Status: SHIPPED | OUTPATIENT
Start: 2024-03-11

## 2024-03-11 NOTE — TELEPHONE ENCOUNTER
Rx Refill Note  Requested Prescriptions     Pending Prescriptions Disp Refills    HYDROcodone-acetaminophen (NORCO)  MG per tablet 20 tablet 0     Sig: Take 1 tablet by mouth Every 6 (Six) Hours As Needed for Moderate Pain.      Last office visit with prescribing clinician: 2/22/2024   Last telemedicine visit with prescribing clinician: Visit date not found   Next office visit with prescribing clinician: 5/22/2024                         Would you like a call back once the refill request has been completed: [] Yes [] No    If the office needs to give you a call back, can they leave a voicemail: [] Yes [] No    Shar Cruz CMA/LMR  03/11/24, 07:42 EDT

## 2024-03-12 ENCOUNTER — TELEPHONE (OUTPATIENT)
Dept: ORTHOPEDIC SURGERY | Facility: HOSPITAL | Age: 49
End: 2024-03-12
Payer: MEDICARE

## 2024-03-12 ENCOUNTER — OFFICE VISIT (OUTPATIENT)
Dept: ORTHOPEDIC SURGERY | Facility: CLINIC | Age: 49
End: 2024-03-12
Payer: MEDICARE

## 2024-03-12 VITALS — BODY MASS INDEX: 41.38 KG/M2 | WEIGHT: 248.4 LBS | TEMPERATURE: 98.9 F | HEIGHT: 65 IN

## 2024-03-12 DIAGNOSIS — M17.11 ARTHRITIS OF KNEE, RIGHT: ICD-10-CM

## 2024-03-12 DIAGNOSIS — R52 PAIN: Primary | ICD-10-CM

## 2024-03-12 PROCEDURE — 1159F MED LIST DOCD IN RCRD: CPT | Performed by: NURSE PRACTITIONER

## 2024-03-12 PROCEDURE — 73562 X-RAY EXAM OF KNEE 3: CPT | Performed by: NURSE PRACTITIONER

## 2024-03-12 PROCEDURE — 1160F RVW MEDS BY RX/DR IN RCRD: CPT | Performed by: NURSE PRACTITIONER

## 2024-03-12 PROCEDURE — S0260 H&P FOR SURGERY: HCPCS | Performed by: NURSE PRACTITIONER

## 2024-03-12 NOTE — PROGRESS NOTES
"   History & Physical       Patient: Aurora Bull  YOB: 1975  Medical Record Number: [id number]  Wt Readings from Last 3 Encounters:   03/12/24 113 kg (248 lb 6.4 oz)   03/05/24 113 kg (248 lb 8 oz)   02/22/24 114 kg (252 lb)     Ht Readings from Last 3 Encounters:   03/12/24 165.1 cm (65\")   03/05/24 165.1 cm (65\")   02/22/24 167.6 cm (65.98\")     Body mass index is 41.34 kg/m².  Facility age limit for growth %justine is 20 years.    Surgeon:  Dr. John Diaz    Chief Complaints:   Chief Complaint   Patient presents with    Right Knee - Pre-op Exam, Pain     Surgery: Right Total Knee Arthroplasty with Maria Esther Navigation    Subjective:    History of Present Illness: 48 y.o. female presents with   Chief Complaint   Patient presents with    Right Knee - Pre-op Exam, Pain   . Chronic symptoms have been progressively worsening despite more conservative treatment measures including medications OTC and prescription, cortisone injections and physical therapy.  Symptoms are associated with ability to move, exercise, and perform activities of daily living.  Symptoms are aggravated by weight bearing and ROM necessary for activities of daily living.   Symptoms improve with rest, ice and elevation only minimally.      Allergies: No Known Allergies    Medications:   Home Medications:  Current Outpatient Medications on File Prior to Visit   Medication Sig    clonazePAM (KlonoPIN) 1 MG tablet Take 1 tablet by mouth 2 (Two) Times a Day. (Patient taking differently: Take 1 tablet by mouth 2 (Two) Times a Day As Needed for Anxiety.)    cyclobenzaprine (FLEXERIL) 10 MG tablet Take 0.5 tablets by mouth 3 (Three) Times a Day As Needed for Muscle Spasms (Pain).    dicyclomine (BENTYL) 20 MG tablet Take 1 tablet by mouth 4 (Four) Times a Day As Needed (diarrhea).    fluvoxaMINE (LUVOX) 100 MG tablet Take 1 tablet by mouth 2 (Two) Times a Day.    fluvoxaMINE (LUVOX) 50 MG tablet Take 1 tablet by mouth Every Night. Take " in addition to 100 mg tablet    HYDROcodone-acetaminophen (NORCO)  MG per tablet Take 1 tablet by mouth Every 6 (Six) Hours As Needed for Moderate Pain.    ipratropium-albuterol (COMBIVENT RESPIMAT)  MCG/ACT inhaler Inhale 1 puff 4 (Four) Times a Day As Needed for Wheezing. (Patient taking differently: Inhale 1 puff 4 (Four) Times a Day As Needed for Wheezing. BRING WITH YOU)    lamoTRIgine (LaMICtal) 150 MG tablet Take 1 tablet by mouth Daily. (Patient taking differently: Take 1 tablet by mouth Every Night.)    OLANZapine zydis (zyPREXA) 5 MG disintegrating tablet Place 1 tablet on the tongue Every Night. dissolve one tablet by mouth for increase psychosis may repeat in 30 minutes if needed. Limit 2 tablets a day (Patient taking differently: Place 1 tablet on the tongue Every Night. dissolve one tablet by mouth for increase psychosis may repeat in 30 minutes if needed. Limit 2 tablets a day     TAKES PRN)    prazosin (MINIPRESS) 5 MG capsule Take 1 capsule by mouth Every Night.    vitamin B-12 (CYANOCOBALAMIN) 500 MCG tablet Take 1 tablet by mouth Daily. (Patient taking differently: Take 1 tablet by mouth Daily. HOLD FOR SURGERY 1-2 WEEKS PRIOR)    ziprasidone (GEODON) 20 MG capsule Take 1 capsule by mouth Every Night.    ziprasidone (GEODON) 80 MG capsule Take 1 capsule by mouth 2 (Two) Times a Day. (Patient taking differently: Take 1 capsule by mouth 2 (Two) Times a Day. TAKES ADDITIONAL 20MG AT BEDTIME)     No current facility-administered medications on file prior to visit.     Current Medications:  Scheduled Meds:  Continuous Infusions:No current facility-administered medications for this visit.    PRN Meds:.    I have reviewed the patient's medical history in detail and updated the computerized patient record.  Review and summarization of old records include:    Past Medical History:   Diagnosis Date    Asthma     Chronic diarrhea     Chronic knee pain     bilateral    Chronic lower back pain      Depression     Dislocated elbow     History of COVID-19     Kidney stones     Knee swelling 2014    Low back strain 2017    Neck strain 2017    Osgood-Schlatter's disease 1988    PONV (postoperative nausea and vomiting)     APTIENT DENIES    PTSD (post-traumatic stress disorder) 2014    Schizoaffective disorder 2012    Sleep apnea     NO MACHINE SINCE LOOSING WEIGHT LOSS        Past Surgical History:   Procedure Laterality Date    ADENOIDECTOMY  1988    CARDIAC CATHETERIZATION  2014     SECTION  2004    CHOLECYSTECTOMY      COLONOSCOPY  2019    COLONOSCOPY N/A 2022    Procedure: COLONOSCOPY, BIOPSIES, POLYPECTOMY;  Surgeon: Jerald Keller MD;  Location: [facility] MAIN OR;  Service: Gastroenterology;  Laterality: N/A;  POLYP X 1    ENDOSCOPY  2018    ENDOSCOPY N/A 2022    Procedure: ESOPHAGOGASTRODUODENOSCOPY WITH BIOPSIES;  Surgeon: Jerald Keller MD;  Location: [facility] MAIN OR;  Service: Gastroenterology;  Laterality: N/A;  ESOPHAGITIS, HIATAL HERNIA      GASTRIC SLEEVE LAPAROSCOPIC  2014    HYSTERECTOMY  2019    KNEE CARTILAGE SURGERY Right 2015    KNEE SURGERY Right 2016    scope    NERVE BLOCK Bilateral 3/21/2022    Procedure: KNEE GENICULAR NERVE BLOCK UNDER FLUORO bilateral;  Surgeon: Emily Smith MD;  Location: [facility] MAIN OR;  Service: Pain Management;  Laterality: Bilateral;    TONSILLECTOMY  1988    UPPER GASTROINTESTINAL ENDOSCOPY          Social History     Occupational History    Not on file   Tobacco Use    Smoking status: Former     Current packs/day: 1.50     Average packs/day: 1.5 packs/day for 34.2 years (51.3 ttl pk-yrs)     Types: Cigarettes     Start date:      Passive exposure: Past    Smokeless tobacco: Never    Tobacco comments:     Quit in    Vaping Use    Vaping status: Never Used   Substance and Sexual Activity    Alcohol use: Not Currently     Alcohol/week: 2.0 standard drinks of alcohol     Comment: ONCE A MONTH    Drug use: Not Currently     Sexual activity: Yes     Birth control/protection: Hysterectomy      Social History     Social History Narrative    Not on file        Family History   Problem Relation Age of Onset    Diabetes Mother     Diabetes Father     Hyperlipidemia Father     Heart disease Father     Hypertension Father     Hyperlipidemia Brother     Diabetes Brother     Heart block Brother     Scoliosis Son     Diabetes Maternal Aunt     Dwarfism Maternal Uncle     Stroke Maternal Uncle     Hypertension Paternal Uncle     Mental illness Maternal Grandmother     Diabetes Maternal Grandmother     Memory loss Maternal Grandmother     Esophageal cancer Maternal Grandfather     Alzheimer's disease Paternal Grandmother     Hypertension Paternal Grandmother     Hypertension Paternal Grandfather     Heart murmur Paternal Grandfather     Seasonal affective disorder Brother     Drug abuse Brother     Mental illness Brother     Alcohol abuse Brother     Colon cancer Neg Hx     Colon polyps Neg Hx     Crohn's disease Neg Hx     Irritable bowel syndrome Neg Hx     Ulcerative colitis Neg Hx        ROS: 14 point review of systems was performed and was negative except for documented findings in HPI and today's encounter.       Constitutional:  Denies fever, shaking or chills   Eyes:  Denies change in visual acuity   HENT:  Denies nasal congestion or sore throat   Respiratory:  Denies cough or shortness of breath   Cardiovascular:  Denies chest pain or severe LE edema   GI:  Denies abdominal pain, nausea, vomiting, bloody stools or diarrhea   Musculoskeletal:  Denies numbness tingling or loss of motor function except as outlined above in history of present illness.  : Denies painful urination or hematuria  Integument:  Denies rash, lesion or ulceration   Neurologic:  Denies headache or focal weakness  Endocrine:  Denies lymphadenopathy  Psych:  Denies confusion or change in mental status   Hem:  Denies active bleeding    Physical Exam: 48 y.o. female  Wt  "Readings from Last 3 Encounters:   03/12/24 113 kg (248 lb 6.4 oz)   03/05/24 113 kg (248 lb 8 oz)   02/22/24 114 kg (252 lb)     Ht Readings from Last 3 Encounters:   03/12/24 165.1 cm (65\")   03/05/24 165.1 cm (65\")   02/22/24 167.6 cm (65.98\")     Body mass index is 41.34 kg/m².  Facility age limit for growth %justine is 20 years.  Vitals:    03/12/24 1104   Temp: 98.9 °F (37.2 °C)       Vital signs reviewed.   General Appearance:    Alert, cooperative, in no acute distress                  Eyes: conjunctiva clear  ENT: external ears and nose atraumatic  CV: no peripheral edema  Resp: normal respiratory effort  Skin: no rashes or wounds; normal turgor  Psych: mood and affect appropriate  Lymph: no nodes appreciated  Neuro: gross sensation intact  Vascular:  Palpable peripheral pulse in noted extremity  Musculoskeletal Extremities: KNEE Exam: medial joint line tenderness with crepitation, synovitis, swelling, and joint effusion right knee.        Diagnostic Tests:  Lab Results   Component Value Date    WBC 4.86 03/05/2024    HGB 13.2 03/05/2024    HCT 39.8 03/05/2024    MCV 85.8 03/05/2024     03/05/2024     Lab Results   Component Value Date    GLUCOSE 87 03/05/2024    CALCIUM 9.1 03/05/2024     03/05/2024    K 4.6 03/05/2024    CO2 27.1 03/05/2024     03/05/2024    BUN 13 03/05/2024    CREATININE 1.05 (H) 03/05/2024    EGFRRESULT 66.8 11/10/2023    EGFR 66.1 03/05/2024    BCR 12.4 03/05/2024    ANIONGAP 8.9 03/05/2024       EKG:    Progress Note    HEART RATE= 67  bpm  RR Interval= 896  ms  AR Interval= 125  ms  P Horizontal Axis= 33  deg  P Front Axis= -1  deg  QRSD Interval= 105  ms  QT Interval= 425  ms  QTcB= 449  ms  QRS Axis= 15  deg  T Wave Axis= 19  deg  - NORMAL ECG -  Sinus rhythm  No change from previous tracing  Electronically Signed By: Kiki Hall ([facility]) 05-Mar-2024 17:30:03  Date and Time of Study: 2024-03-05 13:30:16       I have reviewed all the lab & EKG results. "     Radiology:  AP, lateral, 40 degree PA of the right knee obtained in the office today due to pain, with comparison views shows advanced tricompartmental degenerative changes, most significantly  medial and patellofemoral compartment, with osteophyte formation and subchondral sclerosis      Assessment:  Patient Active Problem List    Diagnosis Date Noted    Arthritis of right knee 01/08/2024    Chest pain 05/05/2022     Note Last Updated: 5/5/2022     Added automatically from request for surgery [id number]      Nausea and vomiting 05/05/2022     Note Last Updated: 5/5/2022     Added automatically from request for surgery [id number]      Diarrhea 05/05/2022     Note Last Updated: 5/5/2022     Added automatically from request for surgery [id number]      Osteoarthritis of both knees 03/11/2022     Note Last Updated: 3/11/2022     Added automatically from request for surgery [id number]      Chronic diarrhea 11/30/2021    Mild intermittent asthma without complication 11/03/2021    Blood in urine 04/04/2017    Moderate persistent asthma 04/03/2017    Iron deficiency anemia 04/03/2017    Gastroesophageal reflux disease 04/03/2017    Family history of coronary arteriosclerosis 03/31/2017    Ex-smoker 03/31/2017    Preinfarction syndrome 03/02/2017    Glaucoma suspect of both eyes 04/29/2015    Vitamin D deficiency 11/21/2013    Schizoaffective disorder 03/14/2013    Generalized anxiety disorder 03/14/2013    Knee pain 10/09/2009    Body mass index 40.0-44.9, adult 01/12/2009       Plan:  Dr. John Diaz reviewed anatomy of a total joint arthroplasty in laymen's terms, as well as typical postoperative recovery and possibly 6-12 months for maximal recovery, and possible need for rehabilitation stay after hospitalization. We also discussed risks, benefits, alternatives, and limitations of procedure with risks including but not limited to neurovascular damage, bleeding, infection, malalignment, chronic pian, failure of implants,  osteolysis, loosening of implants, loss of motion, weakness, stiffness, instability, DVT, pulmonary embolus, death, stroke, complex regional pain syndrome, myocardial infarction, and need for additional procedures. Concept of substitution vs. replacement discussed.  No guarantees were given regarding results of surgery.      Aurora Bull was given the opportunity to ask and have all questions answered today.  The patient voiced understanding of the risks, benefits, and alternative forms of treatment that were discussed and the patient consents to proceed with surgery.         Skin breakdown? WNL  Metal allergy? No  DVT Risk Factors: no significant increased risk factors    DVT Prophylaxis:  Aspirin 81mg BID x2 weeks, then aspirin 81mg daily x4 weeks  Walker: has one at home  Consults: none  Additional orders: Percocet 7.5-325mg q6h PRN quantity 18  Pharmacy: meds to beds    Discharge Plan: Same Day  to home, home health, and when cleared by physical therapy as safe for discharge    To be updated    Date: 3/12/2024  MJ Avendaño    Dictated Utilizing Dragon Dictation

## 2024-03-12 NOTE — TELEPHONE ENCOUNTER
Risk Factor yes no   Age >75  X   BMI <20 >40 X    Patient History     Chronic Pain (2 or more meds/Pain Management)  X   ETOH (more than 3 drinks Daily)  X   Uncontrolled Depression/Bipolar/Schizoaffective Disorder X    Arrhythmias--  X   Stent placement/MI  X   DVT/PE  X   Pacemaker  X   HTN (uncontrolled or requiring more than 2 medications)  X   CHF/Retained fluids/Edema  X   Stroke with Residual   X   COPD/Asthma X    MARLO--Non-compliant with CPAP  X   Diabetes (on insulin or more than 2 meds)         A1C:  X   BPH/Urinary retention (on medication)  X   CKD  X   Home environment and support     Current ambulation status (use of cane, walker, W/C, Multiple falls/weakness) X    Stairs to enter and throughout home X    Lives Alone  X   Doesn't have support at home  X   Outpatient Screening Assessment    Home needs: (Walker/BSC):  Has walker  ? Steps 3 steps   Caregiver 24-48hrs post-discharge: /Father    Discharge Plan:   [facility]  Prescriptions: Meds to bed    Home medications:   [] Blood thinner/anti-coag therapy--   [] BPH or diuretic--  [] BP meds--   ? Pain/Anti-inflammatories--Norco  Pre-op Education:  Educate patient on spinal anesthesia/pain control:  ? patient verbalize understanding    Educate patient on hospital course/timeline:  ?  patient verbalize understanding    Joint Care Class:  ?  yes [] no  Notes:

## 2024-03-15 ENCOUNTER — PREP FOR SURGERY (OUTPATIENT)
Dept: OTHER | Facility: HOSPITAL | Age: 49
End: 2024-03-15
Payer: MEDICARE

## 2024-03-15 DIAGNOSIS — M17.11 ARTHRITIS OF RIGHT KNEE: Primary | ICD-10-CM

## 2024-03-15 RX ORDER — ACETAMINOPHEN 10 MG/ML
1000 INJECTION, SOLUTION INTRAVENOUS ONCE
OUTPATIENT
Start: 2024-03-15 | End: 2024-03-15

## 2024-03-15 RX ORDER — MELOXICAM 15 MG/1
15 TABLET ORAL ONCE
OUTPATIENT
Start: 2024-03-19 | End: 2024-03-15

## 2024-03-15 RX ORDER — PREGABALIN 75 MG/1
150 CAPSULE ORAL ONCE
OUTPATIENT
Start: 2024-03-19 | End: 2024-03-15

## 2024-03-18 ENCOUNTER — OFFICE VISIT (OUTPATIENT)
Dept: ORTHOPEDIC SURGERY | Facility: CLINIC | Age: 49
End: 2024-03-18
Payer: MEDICARE

## 2024-03-18 ENCOUNTER — PREP FOR SURGERY (OUTPATIENT)
Dept: OTHER | Facility: HOSPITAL | Age: 49
End: 2024-03-18
Payer: MEDICARE

## 2024-03-18 VITALS — WEIGHT: 247.2 LBS | BODY MASS INDEX: 41.19 KG/M2 | TEMPERATURE: 98.6 F | HEIGHT: 65 IN

## 2024-03-18 DIAGNOSIS — M17.11 PRIMARY OSTEOARTHRITIS OF RIGHT KNEE: Primary | ICD-10-CM

## 2024-03-18 DIAGNOSIS — R52 PAIN: ICD-10-CM

## 2024-03-18 PROCEDURE — 99214 OFFICE O/P EST MOD 30 MIN: CPT | Performed by: ORTHOPAEDIC SURGERY

## 2024-03-18 RX ORDER — ALBUTEROL SULFATE 90 UG/1
AEROSOL, METERED RESPIRATORY (INHALATION)
COMMUNITY

## 2024-03-18 RX ORDER — GABAPENTIN 300 MG/1
CAPSULE ORAL
COMMUNITY

## 2024-03-18 RX ORDER — SODIUM, POTASSIUM,MAG SULFATES 17.5-3.13G
SOLUTION, RECONSTITUTED, ORAL ORAL
COMMUNITY

## 2024-03-18 RX ORDER — GABAPENTIN 100 MG/1
CAPSULE ORAL
COMMUNITY

## 2024-03-19 DIAGNOSIS — M25.561 CHRONIC PAIN OF BOTH KNEES: ICD-10-CM

## 2024-03-19 DIAGNOSIS — M25.562 CHRONIC PAIN OF BOTH KNEES: ICD-10-CM

## 2024-03-19 DIAGNOSIS — M17.0 OSTEOARTHRITIS OF BOTH KNEES, UNSPECIFIED OSTEOARTHRITIS TYPE: ICD-10-CM

## 2024-03-19 DIAGNOSIS — G89.29 CHRONIC PAIN OF BOTH KNEES: ICD-10-CM

## 2024-03-19 NOTE — TELEPHONE ENCOUNTER
Rx Refill Note  Requested Prescriptions     Pending Prescriptions Disp Refills    HYDROcodone-acetaminophen (NORCO)  MG per tablet 20 tablet 0     Sig: Take 1 tablet by mouth Every 6 (Six) Hours As Needed for Moderate Pain.      Last office visit with prescribing clinician: 2/22/2024   Last telemedicine visit with prescribing clinician: Visit date not found   Next office visit with prescribing clinician: 5/22/2024                         Would you like a call back once the refill request has been completed: [] Yes [] No    If the office needs to give you a call back, can they leave a voicemail: [] Yes [] No    Shar Cruz CMA/LUIS  03/19/24, 12:38 EDT

## 2024-03-20 RX ORDER — HYDROCODONE BITARTRATE AND ACETAMINOPHEN 10; 325 MG/1; MG/1
1 TABLET ORAL EVERY 6 HOURS PRN
Qty: 20 TABLET | Refills: 0 | Status: SHIPPED | OUTPATIENT
Start: 2024-03-20

## 2024-03-23 NOTE — PROGRESS NOTES
Patient ID: Aurora Bull     Chief Complaint:    Chief Complaint   Patient presents with    Right Knee - Pre-op Exam        HPI:    Aurora Bull is a 48 y.o. who presents today for evaluation right knee pain.  Patient was previously seen by my partner but referred to me for continued care.  She was scheduled for total knee replacement surgery due to diagnosis of knee arthritis.  Patient states she has had symptoms for some time is failed conservative treatments consisting of rest, ice, anti-inflammatories and some therapy.  States pain is resulted in her using walking aid altering normal daily activities and limiting overall quality life due to symptoms.  Patient states she is overall healthy however does have a tooth issue but has not seen a dentist in some time.    Social History     Socioeconomic History    Marital status:    Tobacco Use    Smoking status: Former     Current packs/day: 1.50     Average packs/day: 1.5 packs/day for 34.2 years (51.3 ttl pk-yrs)     Types: Cigarettes     Start date: 1990     Passive exposure: Past    Smokeless tobacco: Never    Tobacco comments:     Quit in 2014   Vaping Use    Vaping status: Never Used   Substance and Sexual Activity    Alcohol use: Not Currently     Alcohol/week: 2.0 standard drinks of alcohol     Comment: ONCE A MONTH    Drug use: Not Currently    Sexual activity: Yes     Birth control/protection: Hysterectomy     Past Medical History:   Diagnosis Date    Asthma     Chronic diarrhea     Chronic knee pain     bilateral    Chronic lower back pain     Depression     Dislocated elbow 1980    History of COVID-19     Kidney stones     Knee swelling 2014    Low back strain 2017    Neck strain 2017    Osgood-Schlatter's disease 1988    PONV (postoperative nausea and vomiting)     APTIENT DENIES    PTSD (post-traumatic stress disorder) 2014    Schizoaffective disorder 2012    Sleep apnea     NO MACHINE SINCE LOOSING WEIGHT LOSS     Family History   Problem  "Relation Age of Onset    Diabetes Mother     Diabetes Father     Hyperlipidemia Father     Heart disease Father     Hypertension Father     Hyperlipidemia Brother     Diabetes Brother     Heart block Brother     Scoliosis Son     Diabetes Maternal Aunt     Dwarfism Maternal Uncle     Stroke Maternal Uncle     Hypertension Paternal Uncle     Mental illness Maternal Grandmother     Diabetes Maternal Grandmother     Memory loss Maternal Grandmother     Esophageal cancer Maternal Grandfather     Alzheimer's disease Paternal Grandmother     Hypertension Paternal Grandmother     Hypertension Paternal Grandfather     Heart murmur Paternal Grandfather     Seasonal affective disorder Brother     Drug abuse Brother     Mental illness Brother     Alcohol abuse Brother     Colon cancer Neg Hx     Colon polyps Neg Hx     Crohn's disease Neg Hx     Irritable bowel syndrome Neg Hx     Ulcerative colitis Neg Hx        ROS:    ROS:  Constitutional:  Denies fever, shaking or chills         All other pertinent positives and negatives as noted above in HPI.    Physical Exam:     Vital Signs:  Temp 98.6 °F (37 °C) (Temporal)   Ht 165.1 cm (65\")   Wt 112 kg (247 lb 3.2 oz)   BMI 41.14 kg/m²   Constitutional: Awake alert and oriented x3, well developed, well nourished, no acute distress, non-toxic appearance.      Poor dentition noted.    Musculoskeletal:    Exam of the right  knee:  Slight scratches on the right knee noted seen and very superficial she states from her dog.  There is no breaks in the skin fair to be near healed.  Painful gait with a subtle limp  No muscle atrophy, erythema, ecchymosis, or gross deformity noted  mild knee effusion    Active range of motion 3-110  5/5 strength flexion and extension  The knee is stable to varus and valgus stress testing  Mild varus alignment of the limb  Lachman negative  Posterior drawer negative  Ezra's negative  Patellofemoral grind +  Sensation grossly intact to light tough " throughout the lower extremity  Skin is intact  Distal pulses are 2+  No signs or symptoms of DVT            Diagnostic Studies:     Imaging was personally and individually reviewed and discussed at length with the patient:    Long-leg films taken reviewed imaging demonstrates degenerative changes medial compartment assistant with previous films.            Assessment:     right  Knee Osteoarthritis            Plan:     Based on x-rays, history, and office evaluation, we have diagnosed Aurora Bull with knee arthritis.  Patient has tried and failed nonsurgical treatment as noted above and has previously been indicated on recent office visit.  Patient was scheduled of surgery however multiple modifiable risk factors including dental concerns mainly as well as BMI and slight skin irritation surgery will be delayed until the above can be corrected.  She was instructed that dental clearance and work should be completed prior to any surgical intervention.  Once the above is completed we will get her back on the surgery schedule.    All questions were answered, the patient understands and agrees with the plan.

## 2024-03-27 ENCOUNTER — TELEPHONE (OUTPATIENT)
Dept: FAMILY MEDICINE CLINIC | Facility: CLINIC | Age: 49
End: 2024-03-27
Payer: MEDICARE

## 2024-03-27 NOTE — TELEPHONE ENCOUNTER
Upon further inspection, pt's father's chart notes:  Infection Status    Bed Bugs   Added: 03/13/24 by Misty Santillan RN   Onset date: 03/13/24   Comment: 3/13/2024: [facility] called to notify Infection Control that patient had active bed bugs during his office visit today. Misty Santillan RN       Pt's father SHOULD NOT come into office to .   Spoke w father - letter sent via  and will fax to [facility]. Attempted to obtain fax number - practice closed   P:[phone]

## 2024-03-27 NOTE — TELEPHONE ENCOUNTER
Called pt's father to clarify.     Due to pt's anxiety she will be sedated for multiple upcoming dental procedures. Dentist requires letter from PCP to clear for sedation. No specific verbiage needed.

## 2024-03-27 NOTE — TELEPHONE ENCOUNTER
It does appear she had a preop exam recently with updated EKG and lab work.  Okay to write letter.

## 2024-03-27 NOTE — LETTER
March 27, 2024     Aurora Bull  [address] [address]    Patient: Aurora Bull   YOB: 1975         To Whom It May Concern:    Aurora Bull is a patient under my care. Due to anxiety it is recommended that she undergo sedation.  It is my medial opinion, that she is cleared for sedation for all upcoming dental work and procedures.   She recently underwent a pre-op exam including updated EKG and lab work.   If you have questions, please do not hesitate to call me.    Sincerely,        MJ Valadez

## 2024-03-27 NOTE — TELEPHONE ENCOUNTER
Provider: FREDERIC HAWLEY    Caller: JOEL SUMNER    Relationship to Patient: FATHER      Phone Number: [phone]    Reason for Call: PATIENT'S FATHER IS CALLING TO STATE PATIENT NEEDS A LETTER FROM MS HAWLEY STATING PATIENT IS CLEAR FOR DENTAL WORK AND SEDATION.  FATHER WOULD LIKE TO  WHEN READY.    PLEASE ADVISE.

## 2024-03-27 NOTE — TELEPHONE ENCOUNTER
Pt's father has an active case of bed bugs per cardiology  Infection Status    Bed Bugs   Added: 03/13/24 by Misty Santillan RN   Onset date: 03/13/24   Comment: 3/13/2024: [facility] called to notify Infection Control that patient had active bed bugs during his office visit today. Misty Santillan RN     Natalya spoke w pt and informed him letter will be faxed and sent to pt via .   Pt informed he needs to confirm bed bug infection has been eradicated before next appt.  Pt v/u

## 2024-03-27 NOTE — TELEPHONE ENCOUNTER
Hub staff attempted to follow warm transfer process and was unsuccessful     Caller: JOEL SUMNER    Relationship to patient: Father    Best call back number: [phone]     Patient is needing: PATIENT FATHER JOEL RETURNED CALL TO [facility].  HE RECEIVED MESSAGE TO NOT COME IN THE OFFICE TO  THE CLEARANCE LETTER FOR THE PATIENT FOR DENTAL PROC AND NEEDS TO KNOW REASON.  PLEASE CALL HIM BACK.

## 2024-04-16 ENCOUNTER — TELEPHONE (OUTPATIENT)
Dept: ORTHOPEDIC SURGERY | Facility: CLINIC | Age: 49
End: 2024-04-16
Payer: MEDICARE

## 2024-04-16 DIAGNOSIS — M25.562 CHRONIC PAIN OF BOTH KNEES: ICD-10-CM

## 2024-04-16 DIAGNOSIS — M17.0 OSTEOARTHRITIS OF BOTH KNEES, UNSPECIFIED OSTEOARTHRITIS TYPE: ICD-10-CM

## 2024-04-16 DIAGNOSIS — M25.561 CHRONIC PAIN OF BOTH KNEES: ICD-10-CM

## 2024-04-16 DIAGNOSIS — G89.29 CHRONIC PAIN OF BOTH KNEES: ICD-10-CM

## 2024-04-16 NOTE — TELEPHONE ENCOUNTER
Provider: KENN     Caller: CELESTE     Relationship to Patient: FATHER     Phone Number: [phone]    Reason for Call: CELESTE IS ASKING IF THE CLEARANCE FORM THAT NEEDED TO BE FILLED OUT WAS FAXED TO [facility]. PLEASE CALL HIM WITH AN UPDATE.

## 2024-04-16 NOTE — TELEPHONE ENCOUNTER
Spoke with patients dad and he stated that the dental work recommended by [facility] before patient can have surgery has been done. Please send sx clearance to dental office.

## 2024-04-16 NOTE — TELEPHONE ENCOUNTER
Rx Refill Note  Requested Prescriptions     Pending Prescriptions Disp Refills    HYDROcodone-acetaminophen (NORCO)  MG per tablet 20 tablet 0     Sig: Take 1 tablet by mouth Every 6 (Six) Hours As Needed for Moderate Pain.      Last office visit with prescribing clinician: 2/22/2024   Last telemedicine visit with prescribing clinician: Visit date not found   Next office visit with prescribing clinician: 5/22/2024                         Would you like a call back once the refill request has been completed: [] Yes [] No    If the office needs to give you a call back, can they leave a voicemail: [] Yes [] No    Shar Cruz CMA/LUIS  04/16/24, 16:02 EDT

## 2024-04-17 RX ORDER — HYDROCODONE BITARTRATE AND ACETAMINOPHEN 10; 325 MG/1; MG/1
1 TABLET ORAL EVERY 6 HOURS PRN
Qty: 20 TABLET | Refills: 0 | Status: SHIPPED | OUTPATIENT
Start: 2024-04-17

## 2024-04-17 NOTE — TELEPHONE ENCOUNTER
Provider: KENN    Caller: CELESTE    Relationship to Patient: FATHER    Phone Number: [phone]    Reason for Call: CELESTE WOULD LIKE AN UPDATE ON SURGERY SCHEDULING. HE STATES HE SPOKE WITH [facility], AND THEY TOLD HIM THEY DIDN'T RECEIVE ANY CLEARANCE FORM. HE STATES IT NEEDS TO BE FAXED [phone].

## 2024-04-18 NOTE — TELEPHONE ENCOUNTER
Spoke with patient and informed that we have already received the dental clearance. Speaking with Dr. Santos on when to schedule surgery. Patient is aware that TJS is out until Monday and will discuss sx scheduling.

## 2024-04-23 ENCOUNTER — TELEPHONE (OUTPATIENT)
Dept: ORTHOPEDIC SURGERY | Facility: CLINIC | Age: 49
End: 2024-04-23

## 2024-04-23 NOTE — TELEPHONE ENCOUNTER
Caller: CELESTE SUMNER    Relationship to patient: FATHER    Best call back number: [phone]    Caller is needing: PATIENT'S FATHER, CELESTE WAS CALLING TO SCHEDULE SURGERY WITH DR. HAWK FOR THE RIGHT KNEE. THANK YOU!

## 2024-05-06 NOTE — TELEPHONE ENCOUNTER
Caller: Aurora Bull    Relationship to patient: Self    Best call back number: [phone]    Patient is needing: PT IS CALLING BACK TO SET UP SCHEDULING SURGERY.. PT WOULD LIKE A CALL BACK.. PLEASE ADVISE

## 2024-05-10 DIAGNOSIS — M17.0 OSTEOARTHRITIS OF BOTH KNEES, UNSPECIFIED OSTEOARTHRITIS TYPE: ICD-10-CM

## 2024-05-10 DIAGNOSIS — G89.29 CHRONIC PAIN OF BOTH KNEES: ICD-10-CM

## 2024-05-10 DIAGNOSIS — M25.561 CHRONIC PAIN OF BOTH KNEES: ICD-10-CM

## 2024-05-10 DIAGNOSIS — M25.562 CHRONIC PAIN OF BOTH KNEES: ICD-10-CM

## 2024-05-13 RX ORDER — HYDROCODONE BITARTRATE AND ACETAMINOPHEN 10; 325 MG/1; MG/1
1 TABLET ORAL EVERY 6 HOURS PRN
Qty: 20 TABLET | Refills: 0 | Status: SHIPPED | OUTPATIENT
Start: 2024-05-13

## 2024-05-13 NOTE — TELEPHONE ENCOUNTER
Please fill while Sonia is out of the office  UDS 7/5/23  CSA 7/5/23  Rx Refill Note  Requested Prescriptions     Pending Prescriptions Disp Refills    HYDROcodone-acetaminophen (NORCO)  MG per tablet 20 tablet 0     Sig: Take 1 tablet by mouth Every 6 (Six) Hours As Needed for Moderate Pain.      Last office visit with prescribing clinician: 2/22/2024   Last telemedicine visit with prescribing clinician: Visit date not found   Next office visit with prescribing clinician: 5/22/2024                         Would you like a call back once the refill request has been completed: [] Yes [] No    If the office needs to give you a call back, can they leave a voicemail: [] Yes [] No    Seema Cabrales CMA/LUIS  05/13/24, 07:42 EDT

## 2024-05-15 ENCOUNTER — TELEPHONE (OUTPATIENT)
Dept: ORTHOPEDIC SURGERY | Facility: CLINIC | Age: 49
End: 2024-05-15

## 2024-05-15 ENCOUNTER — OFFICE VISIT (OUTPATIENT)
Dept: ORTHOPEDIC SURGERY | Facility: CLINIC | Age: 49
End: 2024-05-15
Payer: MEDICARE

## 2024-05-15 VITALS — BODY MASS INDEX: 40.67 KG/M2 | WEIGHT: 244.1 LBS | HEIGHT: 65 IN | TEMPERATURE: 98.6 F

## 2024-05-15 DIAGNOSIS — M17.11 PRIMARY OSTEOARTHRITIS OF RIGHT KNEE: Primary | ICD-10-CM

## 2024-05-15 PROCEDURE — 99214 OFFICE O/P EST MOD 30 MIN: CPT | Performed by: ORTHOPAEDIC SURGERY

## 2024-05-15 PROCEDURE — 1160F RVW MEDS BY RX/DR IN RCRD: CPT | Performed by: ORTHOPAEDIC SURGERY

## 2024-05-15 PROCEDURE — 1159F MED LIST DOCD IN RCRD: CPT | Performed by: ORTHOPAEDIC SURGERY

## 2024-05-15 NOTE — PROGRESS NOTES
Patient: Aurora Bull  YOB: 1975 48 y.o. female  Medical Record Number: [id number]    Chief Complaints:   Chief Complaint   Patient presents with    Right Knee - Follow-up, Pain       History of Present Illness:Aurora Bull is a 48 y.o. female who presents for follow-up of right knee osteoarthritis.  Patient seen previously.  She has severe right knee arthritis and has tried multiple conservative treatments including rest, ice, anti-inflammatories, injections and therapy but continues to be symptomatic.  Symptoms are affecting her normal daily activities overall quality life.  She has difficulty ambulating uses a cane.  When she was recently seen she had already been scheduled for surgery by my partner however after seeing her she required dental work.  She has obtained dental clearance.  And continues to be quite symptomatic in regards to her knee.  Also states her bedbug situation at her home of residence is been cleared and taking care of.    Allergies: No Known Allergies    Medications:   Current Outpatient Medications   Medication Sig Dispense Refill    albuterol sulfate  (90 Base) MCG/ACT inhaler       dicyclomine (BENTYL) 20 MG tablet Take 1 tablet by mouth 4 (Four) Times a Day As Needed (diarrhea). 120 tablet 2    fluvoxaMINE (LUVOX) 100 MG tablet Take 1 tablet by mouth 2 (Two) Times a Day. 60 tablet 0    fluvoxaMINE (LUVOX) 50 MG tablet Take 1 tablet by mouth Every Night. Take in addition to 100 mg tablet      HYDROcodone-acetaminophen (NORCO)  MG per tablet Take 1 tablet by mouth Every 6 (Six) Hours As Needed for Moderate Pain. 20 tablet 0    ipratropium-albuterol (COMBIVENT RESPIMAT)  MCG/ACT inhaler Inhale 1 puff 4 (Four) Times a Day As Needed for Wheezing. (Patient taking differently: Inhale 1 puff 4 (Four) Times a Day As Needed for Wheezing. BRING WITH YOU) 4 g 1    lamoTRIgine (LaMICtal) 150 MG tablet Take 1 tablet by mouth Daily. (Patient taking differently:  "Take 1 tablet by mouth Every Night.) 30 tablet 0    OLANZapine zydis (zyPREXA) 5 MG disintegrating tablet Place 1 tablet on the tongue Every Night. dissolve one tablet by mouth for increase psychosis may repeat in 30 minutes if needed. Limit 2 tablets a day (Patient taking differently: Place 1 tablet on the tongue Every Night. dissolve one tablet by mouth for increase psychosis may repeat in 30 minutes if needed. Limit 2 tablets a day     TAKES PRN) 30 tablet 0    prazosin (MINIPRESS) 5 MG capsule Take 1 capsule by mouth Every Night.      ziprasidone (GEODON) 20 MG capsule Take 1 capsule by mouth Every Night.      ziprasidone (GEODON) 80 MG capsule Take 1 capsule by mouth 2 (Two) Times a Day. (Patient taking differently: Take 1 capsule by mouth 2 (Two) Times a Day. TAKES ADDITIONAL 20MG AT BEDTIME) 60 capsule 0     No current facility-administered medications for this visit.         The following portions of the patient's history were reviewed and updated as appropriate: allergies, current medications, past family history, past medical history, past social history, past surgical history and problem list.    Review of Systems:   A 14 point review of systems was performed. All systems negative except pertinent positives/negative listed in HPI above    Physical Exam:   Vitals:    05/15/24 0925   Temp: 98.6 °F (37 °C)   TempSrc: Temporal   Weight: 111 kg (244 lb 1.6 oz)   Height: 165.1 cm (65\")   PainSc:   6   PainLoc: Knee       General: A and O x 3, ASA, NAD    SCLERA:    Normal    DENTITION:   Normal  Right lower extremity shows superficial scratch over the anterior aspect of the knee as well as on the anterior distal thigh.  Range of motion 12 degrees to 90 degrees.  Generalized discomfort with range of motion due to pain.  Antalgic gait with a cane.    Radiology: Previous symptoms reviewed    Assessment/Plan:    Right knee osteoarthritis.    I go over the findings the patient discussed conservative and surgical " treatment options.  She has failed multiple conservative treatments as noted above.  This time I feel that she would benefit from total knee replacement surgery.  She is a younger age and she expressed understanding of this but giving her symptoms limiting her normal daily activity overall quality life and failed conservative treatment she is willing to proceed with surgical intervention which would consist of right total knee replacement.  I did tell the patient her BMI is above 40 that was something that like her to work on to get under 40 as this can increase risk she expresses understanding of this.  Also she does have a scratch which needs to be completely healed and she needs to not have any other scratches sores or wounds on the surgical site or body.  We can look to get her scheduled for surgery and we had already gone over surgery in detail including use of a model as well as risk and benefits.  I did discuss risk and benefits with the patient with risk including but not limited to bleeding, infection, damage to nearby nerves, vessels, tendons, ligaments, continued pain, worsening pain, fracture, dislocation, leg length discrepancy, blood clots, even death with anesthesia and possible need for future procedures surgeries.  Patient understood this and has chosen to proceed.      Plan proceed with right total knee replacement surgery as long as BMI and skin are optimized.    Patient would like to be same-day/outpatient surgery.    Please get her scheduled in the next 4 to 6 weeks if able.

## 2024-05-22 ENCOUNTER — OFFICE VISIT (OUTPATIENT)
Dept: FAMILY MEDICINE CLINIC | Facility: CLINIC | Age: 49
End: 2024-05-22
Payer: MEDICARE

## 2024-05-22 VITALS
WEIGHT: 232 LBS | DIASTOLIC BLOOD PRESSURE: 82 MMHG | BODY MASS INDEX: 38.65 KG/M2 | TEMPERATURE: 98 F | SYSTOLIC BLOOD PRESSURE: 124 MMHG | OXYGEN SATURATION: 99 % | HEART RATE: 69 BPM | HEIGHT: 65 IN

## 2024-05-22 DIAGNOSIS — M17.0 OSTEOARTHRITIS OF BOTH KNEES, UNSPECIFIED OSTEOARTHRITIS TYPE: Primary | ICD-10-CM

## 2024-05-22 DIAGNOSIS — Z79.899 HIGH RISK MEDICATION USE: ICD-10-CM

## 2024-05-22 PROCEDURE — 99213 OFFICE O/P EST LOW 20 MIN: CPT | Performed by: NURSE PRACTITIONER

## 2024-05-22 PROCEDURE — 1125F AMNT PAIN NOTED PAIN PRSNT: CPT | Performed by: NURSE PRACTITIONER

## 2024-05-22 NOTE — PROGRESS NOTES
"Chief Complaint  Osteoarthritis (3M fu bilateral knees )    Subjective        Aurora Bull presents to [facility]  History of Present Illness  Aurora Bull is a 48 y.o. female who presents to clinic today for a routine 3 month follow-up. Patient has a history of bilateral knee osteoarthritis.  She takes hydrocodone-acetaminophen  mg tablets every 6 hours as needed.  How often she takes the medication is dependent on her pain.  She has been cooking more and standing for long periods and this causes her knee pain to be worse. History of acute kidney injury related to NSAIDs.  She is planned to have knee replacement in June.    Objective   Vital Signs:  /82   Pulse 69   Temp 98 °F (36.7 °C)   Ht 165.1 cm (65\")   Wt 105 kg (232 lb)   SpO2 99%   BMI 38.61 kg/m²   Estimated body mass index is 38.61 kg/m² as calculated from the following:    Height as of this encounter: 165.1 cm (65\").    Weight as of this encounter: 105 kg (232 lb).               Physical Exam  Vitals reviewed.   Constitutional:       General: She is not in acute distress.     Appearance: Normal appearance. She is obese. She is not ill-appearing, toxic-appearing or diaphoretic.   HENT:      Head: Normocephalic and atraumatic.   Neurological:      General: No focal deficit present.      Mental Status: She is alert and oriented to person, place, and time.   Psychiatric:         Mood and Affect: Mood normal.         Behavior: Behavior normal.        Result Review :          CONTROLLED SUBSTANCE AGREEMENT - SCAN - NORCO, OSTEOARTHRITIS, [id number] (07/05/2023)   ToxASSURE Select 13 Discrete - (07/05/2023 15:00)          Assessment and Plan     Diagnoses and all orders for this visit:    1. Osteoarthritis of both knees, unspecified osteoarthritis type (Primary)    2. High risk medication use  -     ToxASSURE Select 13 Discrete -      Will continue to fill hydrocodone-acetaminophen until her surgery.  After " surgery, orthopedic surgery will take over the prescribing of pain medication. Controlled substance contract and urine drug screen last performed July 2023, renewed today.  Hydrocodone last filled 5/13/2024 for quantity of 20.  Refill upon request.         Follow Up     Return in about 3 months (around 8/22/2024) for Recheck.  Patient was given instructions and counseling regarding her condition or for health maintenance advice. Please see specific information pulled into the AVS if appropriate.       Electronically signed by MJ Valadez, 05/22/24, 1:57 PM EDT.

## 2024-05-29 DIAGNOSIS — M25.562 CHRONIC PAIN OF BOTH KNEES: ICD-10-CM

## 2024-05-29 DIAGNOSIS — M17.0 OSTEOARTHRITIS OF BOTH KNEES, UNSPECIFIED OSTEOARTHRITIS TYPE: ICD-10-CM

## 2024-05-29 DIAGNOSIS — G89.29 CHRONIC PAIN OF BOTH KNEES: ICD-10-CM

## 2024-05-29 DIAGNOSIS — M25.561 CHRONIC PAIN OF BOTH KNEES: ICD-10-CM

## 2024-05-29 RX ORDER — HYDROCODONE BITARTRATE AND ACETAMINOPHEN 10; 325 MG/1; MG/1
1 TABLET ORAL EVERY 6 HOURS PRN
Qty: 20 TABLET | Refills: 0 | Status: SHIPPED | OUTPATIENT
Start: 2024-05-29

## 2024-05-29 NOTE — TELEPHONE ENCOUNTER
Rx Refill Note  Requested Prescriptions     Pending Prescriptions Disp Refills    HYDROcodone-acetaminophen (NORCO)  MG per tablet 20 tablet 0     Sig: Take 1 tablet by mouth Every 6 (Six) Hours As Needed for Moderate Pain.      Last office visit with prescribing clinician: Visit date not found   Last telemedicine visit with prescribing clinician: Visit date not found   Next office visit with prescribing clinician: Visit date not found                         Would you like a call back once the refill request has been completed: [] Yes [] No    If the office needs to give you a call back, can they leave a voicemail: [] Yes [] No    Shar Cruz CMA/LUIS  05/29/24, 13:01 EDT

## 2024-05-30 LAB
6MAM UR QL CFM: NEGATIVE
6MAM/CREAT UR: NOT DETECTED NG/MG CREAT
7AMINOCLONAZEPAM/CREAT UR: NOT DETECTED NG/MG CREAT
A-OH ALPRAZ/CREAT UR: NOT DETECTED NG/MG CREAT
A-OH-TRIAZOLAM/CREAT UR CFM: NOT DETECTED NG/MG CREAT
ALFENTANIL/CREAT UR CFM: NOT DETECTED NG/MG CREAT
ALPHA-HYDROXYMIDAZOLAM, URINE: NOT DETECTED NG/MG CREAT
ALPRAZ/CREAT UR CFM: NOT DETECTED NG/MG CREAT
AMOBARBITAL UR QL CFM: NOT DETECTED
AMPHET/CREAT UR: NOT DETECTED NG/MG CREAT
AMPHETAMINES UR QL CFM: NEGATIVE
BARBITAL UR QL CFM: NOT DETECTED
BARBITURATES UR QL CFM: NEGATIVE
BENZODIAZ UR QL CFM: NEGATIVE
BUPRENORPHINE UR QL CFM: NEGATIVE
BUPRENORPHINE/CREAT UR: NOT DETECTED NG/MG CREAT
BUTABARBITAL UR QL CFM: NOT DETECTED
BUTALBITAL UR QL CFM: NOT DETECTED
BZE/CREAT UR: NOT DETECTED NG/MG CREAT
CANNABINOIDS UR QL CFM: NEGATIVE
CARBOXYTHC/CREAT UR: NOT DETECTED NG/MG CREAT
CLONAZEPAM/CREAT UR CFM: NOT DETECTED NG/MG CREAT
COCAETHYLENE/CREAT UR CFM: NOT DETECTED NG/MG CREAT
COCAINE UR QL CFM: NEGATIVE
COCAINE/CREAT UR CFM: NOT DETECTED NG/MG CREAT
CODEINE/CREAT UR: NOT DETECTED NG/MG CREAT
CREAT UR-MCNC: 116 MG/DL
DESALKYLFLURAZ/CREAT UR: NOT DETECTED NG/MG CREAT
DESMETHYLFLUNITRAZEPAM: NOT DETECTED NG/MG CREAT
DHC/CREAT UR: NOT DETECTED NG/MG CREAT
DIAZEPAM/CREAT UR: NOT DETECTED NG/MG CREAT
DRUGS UR: NORMAL
EDDP/CREAT UR: NOT DETECTED NG/MG CREAT
ETHANOL UR CFM-MCNC: NOT DETECTED G/DL
ETHANOL UR QL CFM: NEGATIVE
FENTANYL UR QL CFM: NEGATIVE
FENTANYL/CREAT UR: NOT DETECTED NG/MG CREAT
FLUNITRAZEPAM UR QL CFM: NOT DETECTED NG/MG CREAT
HYDROCODONE/CREAT UR: NOT DETECTED NG/MG CREAT
HYDROMORPHONE/CREAT UR: NOT DETECTED NG/MG CREAT
LORAZEPAM/CREAT UR: NOT DETECTED NG/MG CREAT
MDA/CREAT UR: NOT DETECTED NG/MG CREAT
MDMA/CREAT UR: NOT DETECTED NG/MG CREAT
MEPHOBARBITAL UR QL CFM: NOT DETECTED
METHADONE UR QL CFM: NEGATIVE
METHADONE/CREAT UR: NOT DETECTED NG/MG CREAT
METHAMPHET/CREAT UR: NOT DETECTED NG/MG CREAT
MIDAZOLAM/CREAT UR CFM: NOT DETECTED NG/MG CREAT
MORPHINE/CREAT UR: NOT DETECTED NG/MG CREAT
N-NORTRAMADOL/CREAT UR CFM: NOT DETECTED NG/MG CREAT
NARCOTICS UR: NEGATIVE
NORBUPRENORPHINE/CREAT UR: NOT DETECTED NG/MG CREAT
NORCODEINE/CREAT UR CFM: NOT DETECTED NG/MG CREAT
NORDIAZEPAM/CREAT UR: NOT DETECTED NG/MG CREAT
NORFENTANYL/CREAT UR: NOT DETECTED NG/MG CREAT
NORHYDROCODONE/CREAT UR: NOT DETECTED NG/MG CREAT
NORMORPHINE UR-MCNC: NOT DETECTED NG/MG CREAT
NOROXYCODONE/CREAT UR: NOT DETECTED NG/MG CREAT
NOROXYMORPHONE/CREAT UR CFM: NOT DETECTED NG/MG CREAT
O-NORTRAMADOL UR CFM-MCNC: NOT DETECTED NG/MG CREAT
OPIATES UR QL CFM: NEGATIVE
OXAZEPAM/CREAT UR: NOT DETECTED NG/MG CREAT
OXYCODONE UR QL CFM: NEGATIVE
OXYCODONE/CREAT UR: NOT DETECTED NG/MG CREAT
OXYMORPHONE/CREAT UR: NOT DETECTED NG/MG CREAT
PENTOBARB UR QL CFM: NOT DETECTED
PHENOBARB UR QL CFM: NOT DETECTED
SECOBARBITAL UR QL CFM: NOT DETECTED
SERVICE CMNT 02-IMP: NORMAL
SUFENTANIL/CREAT UR CFM: NOT DETECTED NG/MG CREAT
TAPENTADOL UR QL CFM: NEGATIVE
TAPENTADOL/CREAT UR: NOT DETECTED NG/MG CREAT
TEMAZEPAM/CREAT UR: NOT DETECTED NG/MG CREAT
THIOPENTAL UR QL CFM: NOT DETECTED
TRAMADOL UR QL CFM: NOT DETECTED NG/MG CREAT

## 2024-06-06 ENCOUNTER — PRE-ADMISSION TESTING (OUTPATIENT)
Dept: PREADMISSION TESTING | Facility: HOSPITAL | Age: 49
End: 2024-06-06
Payer: MEDICARE

## 2024-06-06 VITALS
OXYGEN SATURATION: 96 % | WEIGHT: 242 LBS | RESPIRATION RATE: 18 BRPM | TEMPERATURE: 98.3 F | SYSTOLIC BLOOD PRESSURE: 120 MMHG | HEIGHT: 65 IN | DIASTOLIC BLOOD PRESSURE: 82 MMHG | BODY MASS INDEX: 40.32 KG/M2 | HEART RATE: 87 BPM

## 2024-06-06 LAB
ANION GAP SERPL CALCULATED.3IONS-SCNC: 12 MMOL/L (ref 5–15)
BUN SERPL-MCNC: 7 MG/DL (ref 6–20)
BUN/CREAT SERPL: 6.5 (ref 7–25)
CALCIUM SPEC-SCNC: 9.1 MG/DL (ref 8.6–10.5)
CHLORIDE SERPL-SCNC: 104 MMOL/L (ref 98–107)
CO2 SERPL-SCNC: 23 MMOL/L (ref 22–29)
CREAT SERPL-MCNC: 1.08 MG/DL (ref 0.57–1)
DEPRECATED RDW RBC AUTO: 40.2 FL (ref 37–54)
EGFRCR SERPLBLD CKD-EPI 2021: 63.9 ML/MIN/1.73
ERYTHROCYTE [DISTWIDTH] IN BLOOD BY AUTOMATED COUNT: 13 % (ref 12.3–15.4)
GLUCOSE SERPL-MCNC: 102 MG/DL (ref 65–99)
HCT VFR BLD AUTO: 41.7 % (ref 34–46.6)
HGB BLD-MCNC: 14 G/DL (ref 12–15.9)
MCH RBC QN AUTO: 28.9 PG (ref 26.6–33)
MCHC RBC AUTO-ENTMCNC: 33.6 G/DL (ref 31.5–35.7)
MCV RBC AUTO: 86 FL (ref 79–97)
PLATELET # BLD AUTO: 200 10*3/MM3 (ref 140–450)
PMV BLD AUTO: 10.1 FL (ref 6–12)
POTASSIUM SERPL-SCNC: 3.4 MMOL/L (ref 3.5–5.2)
RBC # BLD AUTO: 4.85 10*6/MM3 (ref 3.77–5.28)
SODIUM SERPL-SCNC: 139 MMOL/L (ref 136–145)
WBC NRBC COR # BLD AUTO: 3.63 10*3/MM3 (ref 3.4–10.8)

## 2024-06-06 PROCEDURE — 85027 COMPLETE CBC AUTOMATED: CPT

## 2024-06-06 PROCEDURE — 80048 BASIC METABOLIC PNL TOTAL CA: CPT

## 2024-06-06 PROCEDURE — 36415 COLL VENOUS BLD VENIPUNCTURE: CPT

## 2024-06-06 NOTE — DISCHARGE INSTRUCTIONS
Take the following medications the morning of surgery:  COMBIVENT INHALER, GEODON & FLUVOXAMINE      If you are on prescription narcotic pain medication to control your pain you may also take that medication the morning of surgery.    General Instructions:  Do not eat solid food after midnight the night before surgery.  You may drink clear liquids day of surgery but must stop at least one hour before your hospital arrival time.  It is beneficial for you to have a clear drink that contains carbohydrates the day of surgery.  We suggest a 12 to 20 ounce bottle of Gatorade or Powerade for non-diabetic patients or a 12 to 20 ounce bottle of G2 or Powerade Zero for diabetic patients. (Pediatric patients, are not advised to drink a 12 to 20 ounce carbohydrate drink)    Clear liquids are liquids you can see through.  Nothing red in color.     Plain water                               Sports drinks  Sodas                                   Gelatin (Jell-O)  Fruit juices without pulp such as white grape juice and apple juice  Popsicles that contain no fruit or yogurt  Tea or coffee (no cream or milk added)  Gatorade / Powerade  G2 / Powerade Zero    Infants may have breast milk up to four hours before surgery.  Infants drinking formula may drink formula up to six hours before surgery.   Patients who avoid smoking, chewing tobacco and alcohol for 4 weeks prior to surgery have a reduced risk of post-operative complications.  Quit smoking as many days before surgery as you can.  Do not smoke, use chewing tobacco or drink alcohol the day of surgery.   If applicable bring your C-PAP/ BI-PAP machine in with you to preop day of surgery.  Bring any papers given to you in the doctor’s office.  Wear clean comfortable clothes.  Do not wear contact lenses, false eyelashes or make-up.  Bring a case for your glasses.   Bring crutches or walker if applicable.  Remove all piercings.  Leave jewelry and any other valuables at home.  Hair  extensions with metal clips must be removed prior to surgery.  The Pre-Admission Testing nurse will instruct you to bring medications if unable to obtain an accurate list in Pre-Admission Testing.            Preventing a Surgical Site Infection:  For 2 to 3 days before surgery, avoid shaving with a razor because the razor can irritate skin and make it easier to develop an infection.    Any areas of open skin can increase the risk of a post-operative wound infection by allowing bacteria to enter and travel throughout the body.  Notify your surgeon if you have any skin wounds / rashes even if it is not near the expected surgical site.  The area will need assessed to determine if surgery should be delayed until it is healed.  The night prior to surgery shower using a fresh bar of anti-bacterial soap (such as Dial) and clean washcloth.  Sleep in a clean bed with clean clothing.  Do not allow pets to sleep with you.  Shower on the morning of surgery using a fresh bar of anti-bacterial soap (such as Dial) and clean washcloth.  Dry with a clean towel and dress in clean clothing.  Ask your surgeon if you will be receiving antibiotics prior to surgery.  Make sure you, your family, and all healthcare providers clean their hands with soap and water or an alcohol based hand  before caring for you or your wound.    Day of surgery:  Your arrival time is approximately two hours before your scheduled surgery time.  Upon arrival, a Pre-op nurse and Anesthesiologist will review your health history, obtain vital signs, and answer questions you may have.  The only belongings needed at this time will be a list of your home medications and if applicable your C-PAP/BI-PAP machine.  A Pre-op nurse will start an IV and you may receive medication in preparation for surgery, including something to help you relax.     Please be aware that surgery does come with discomfort.  We want to make every effort to control your discomfort so  please discuss any uncontrolled symptoms with your nurse.   Your doctor will most likely have prescribed pain medications.      If you are going home after surgery you will receive individualized written care instructions before being discharged.  A responsible adult must drive you to and from the hospital on the day of your surgery and ideally stay with you through the night.   .  Discharge prescriptions can be filled by the hospital pharmacy during regular pharmacy hours.  If you are having surgery late in the day/evening your prescription may be e-prescribed to your pharmacy.  Please verify your pharmacy hours or chose a 24 hour pharmacy to avoid not having access to your prescription because your pharmacy has closed for the day.    If you are staying overnight following surgery, you will be transported to your hospital room following the recovery period.  [facility] has all private rooms.    If you have any questions please call Pre-Admission Testing at [phone].  Deductibles and co-payments are collected on the day of service. Please be prepared to pay the required co-pay, deductible or deposit on the day of service as defined by your plan.    Call your surgeon immediately if you experience any of the following symptoms:  Sore Throat  Shortness of Breath or difficulty breathing  Cough  Chills  Body soreness or muscle pain  Headache  Fever  New loss of taste or smell  Do not arrive for your surgery ill.  Your procedure will need to be rescheduled to another time.  You will need to call your physician before the day of surgery to avoid any unnecessary exposure to hospital staff as well as other patients.    CHLORHEXIDINE CLOTH INSTRUCTIONS  The morning of surgery follow these instructions using the Chlorhexidine cloths you've been given.  These steps reduce bacteria on the body.  Do not use the cloths near your eyes, ears mouth, genitalia or on open wounds.  Throw the cloths away after use but  do not try to flush them down a toilet.      Open and remove one cloth at a time from the package.    Leave the cloth unfolded and begin the bathing.  Massage the skin with the cloths using gentle pressure to remove bacteria.  Do not scrub harshly.   Follow the steps below with one 2% CHG cloth per area (6 total cloths).  One cloth for neck, shoulders and chest.  One cloth for both arms, hands, fingers and underarms (do underarms last).  One cloth for the abdomen followed by groin.  One cloth for right leg and foot including between the toes.  One cloth for left leg and foot including between the toes.  The last cloth is to be used for the back of the neck, back and buttocks.    Allow the CHG to air dry 3 minutes on the skin which will give it time to work and decrease the chance of irritation.  The skin may feel sticky until it is dry.  Do not rinse with water or any other liquid or you will lose the beneficial effects of the CHG.  If mild skin irritation occurs, do rinse the skin to remove the CHG.  Report this to the nurse at time of admission.  Do not apply lotions, creams, ointments, deodorants or perfumes after using the clothes. Dress in clean clothes before coming to the hospital.

## 2024-06-07 ENCOUNTER — TELEPHONE (OUTPATIENT)
Dept: ORTHOPEDIC SURGERY | Facility: HOSPITAL | Age: 49
End: 2024-06-07
Payer: MEDICARE

## 2024-06-07 NOTE — TELEPHONE ENCOUNTER
Called and spoke with Mr. Hooks as his number is listed as primary. Spoke with Ms. Bull in March and plan was go home after the surgery upon meeting goals. Mr. Hooks said that is still the plan. We went over the pre-op assessment and there are no changes at this time.     Risk Factor yes no   Age >75   X   BMI <20 >40 X     Patient History       Chronic Pain (2 or more meds/Pain Management)   X   ETOH (more than 3 drinks Daily)   X   Uncontrolled Depression/Bipolar/Schizoaffective Disorder   X   Arrhythmias--   X   Stent placement/MI   X   DVT/PE   X   Pacemaker   X   HTN (uncontrolled or requiring more than 2 medications)   X   CHF/Retained fluids/Edema   X   Stroke with Residual    X   COPD/Asthma X     MARLO--Non-compliant with CPAP   X   Diabetes (on insulin or more than 2 meds)         A1C:   X   BPH/Urinary retention (on medication)   X   CKD   X   Home environment and support       Current ambulation status (use of cane, walker, W/C, Multiple falls/weakness) X     Stairs to enter and throughout home X     Lives Alone   X   Doesn't have support at home   X   Outpatient Screening Assessment     Home needs: (Walker/BSC):  Has walker  ? Steps 3 steps   Caregiver 24-48hrs post-discharge: /Father     Discharge Plan:   [facility]  Prescriptions: Meds to bed     Home medications:   [] Blood thinner/anti-coag therapy--   [] BPH or diuretic--  [] BP meds--   ? Pain/Anti-inflammatories--Norco  Pre-op Education:  Educate patient on spinal anesthesia/pain control:  ? patient verbalize understanding     Educate patient on hospital course/timeline:  ?  patient verbalize understanding     Joint Care Class:  ?  yes [] no  Notes:

## 2024-06-14 ENCOUNTER — OFFICE VISIT (OUTPATIENT)
Dept: ORTHOPEDIC SURGERY | Facility: CLINIC | Age: 49
End: 2024-06-14
Payer: MEDICARE

## 2024-06-14 VITALS — TEMPERATURE: 97.9 F | HEIGHT: 65 IN | WEIGHT: 236.7 LBS | BODY MASS INDEX: 39.44 KG/M2

## 2024-06-14 DIAGNOSIS — M17.11 PRIMARY OSTEOARTHRITIS OF RIGHT KNEE: Primary | ICD-10-CM

## 2024-06-14 NOTE — PROGRESS NOTES
Patient: Aurora Bull        YOB: 1975    Medical Record Number: [id number]    Admitting Physician: Dr. Devin Santos    Reason for Admission: End Stage Right Knee OA    History of Present Illness: 48 y.o. female presents with severe end stage knee osteoarthritis which has not been responsive to the full compliment of conservative measures. Despite conservative attempts, there is still severe, constant activity limiting pain. Given the severity of the pain, the patient has elected to proceed with knee replacement.    Allergies: No Known Allergies      Current Medications:  Home Medications:    Current Outpatient Medications on File Prior to Visit   Medication Sig    albuterol sulfate  (90 Base) MCG/ACT inhaler As Needed.    fluvoxaMINE (LUVOX) 100 MG tablet Take 1 tablet by mouth 2 (Two) Times a Day.    fluvoxaMINE (LUVOX) 50 MG tablet Take 1 tablet by mouth Every Night. Take in addition to 100 mg tablet    HYDROcodone-acetaminophen (NORCO)  MG per tablet Take 1 tablet by mouth Every 6 (Six) Hours As Needed for Moderate Pain.    ipratropium-albuterol (COMBIVENT RESPIMAT)  MCG/ACT inhaler Inhale 1 puff 4 (Four) Times a Day As Needed for Wheezing. (Patient taking differently: Inhale 1 puff Daily.)    lamoTRIgine (LaMICtal) 150 MG tablet Take 1 tablet by mouth Daily. (Patient taking differently: Take 1 tablet by mouth Every Night.)    OLANZapine zydis (zyPREXA) 5 MG disintegrating tablet Place 1 tablet on the tongue Every Night. dissolve one tablet by mouth for increase psychosis may repeat in 30 minutes if needed. Limit 2 tablets a day (Patient taking differently: Place 1 tablet on the tongue At Night As Needed. dissolve one tablet by mouth for increase psychosis may repeat in 30 minutes if needed. Limit 2 tablets a day     TAKES PRN)    prazosin (MINIPRESS) 5 MG capsule Take 1 capsule by mouth Every Night.    ziprasidone (GEODON) 20 MG capsule Take 1 capsule by mouth Every  Night.    ziprasidone (GEODON) 80 MG capsule Take 1 capsule by mouth 2 (Two) Times a Day. (Patient taking differently: Take 1 capsule by mouth 2 (Two) Times a Day. TAKES ADDITIONAL 20MG AT BEDTIME)     No current facility-administered medications on file prior to visit.     PRN Meds:.    PMH:     Past Medical History:   Diagnosis Date    Asthma     Chronic diarrhea     Chronic knee pain     bilateral    Chronic lower back pain     Depression     Dislocated elbow 1980    History of COVID-19     History of sleep apnea     SINCE WEIGHT LOSS    Kidney stones     Knee swelling     Low back strain 2017    Neck strain 2017    Osgood-Schlatter's disease 1988    Osteoarthritis     PTSD (post-traumatic stress disorder)     Right knee pain     Schizoaffective disorder        PF/Surg/Soc Hx:     Past Surgical History:   Procedure Laterality Date    ADENOIDECTOMY      CARDIAC CATHETERIZATION       SECTION      CHOLECYSTECTOMY      COLONOSCOPY  2019    COLONOSCOPY N/A 2022    Procedure: COLONOSCOPY, BIOPSIES, POLYPECTOMY;  Surgeon: Jerald Keller MD;  Location: [facility] [place];  Service: Gastroenterology;  Laterality: N/A;  POLYP X 1    ENDOSCOPY  2018    ENDOSCOPY N/A 2022    Procedure: ESOPHAGOGASTRODUODENOSCOPY WITH BIOPSIES;  Surgeon: Jerald Keller MD;  Location: [facility] [place];  Service: Gastroenterology;  Laterality: N/A;  ESOPHAGITIS, HIATAL HERNIA      GASTRIC SLEEVE LAPAROSCOPIC  2014    HYSTERECTOMY  2019    KNEE CARTILAGE SURGERY Right 2015    KNEE SURGERY Right 2016    scope    NERVE BLOCK Bilateral 3/21/2022    Procedure: KNEE GENICULAR NERVE BLOCK UNDER FLUORO bilateral;  Surgeon: Emily Smith MD;  Location: [facility] [place] MAIN OR;  Service: Pain Management;  Laterality: Bilateral;    TONSILLECTOMY      UPPER GASTROINTESTINAL ENDOSCOPY          Social History     Occupational History    Not on file   Tobacco Use    Smoking status: Former     Current packs/day:  "1.50     Average packs/day: 1.5 packs/day for 34.5 years (51.7 ttl pk-yrs)     Types: Cigarettes     Start date: 1990     Passive exposure: Past    Smokeless tobacco: Never    Tobacco comments:     Quit in 2014   Vaping Use    Vaping status: Never Used   Substance and Sexual Activity    Alcohol use: Not Currently     Alcohol/week: 2.0 standard drinks of alcohol     Comment: ONCE A MONTH    Drug use: Not Currently    Sexual activity: Yes     Birth control/protection: Hysterectomy      Social History     Social History Narrative    Not on file        Family History   Problem Relation Age of Onset    Diabetes Mother     Diabetes Father     Hyperlipidemia Father     Heart disease Father     Hypertension Father     Hyperlipidemia Brother     Diabetes Brother     Heart block Brother     Seasonal affective disorder Brother     Drug abuse Brother     Mental illness Brother     Alcohol abuse Brother     Scoliosis Son     Diabetes Maternal Aunt     Dwarfism Maternal Uncle     Stroke Maternal Uncle     Hypertension Paternal Uncle     Mental illness Maternal Grandmother     Diabetes Maternal Grandmother     Memory loss Maternal Grandmother     Esophageal cancer Maternal Grandfather     Alzheimer's disease Paternal Grandmother     Hypertension Paternal Grandmother     Hypertension Paternal Grandfather     Heart murmur Paternal Grandfather     Colon cancer Neg Hx     Colon polyps Neg Hx     Crohn's disease Neg Hx     Irritable bowel syndrome Neg Hx     Ulcerative colitis Neg Hx     Malig Hyperthermia Neg Hx          Review of Systems:   A 14 point review of systems was performed, pertinent positives discussed above, all other systems are negative    Physical Exam: 48 y.o. female  Vital Signs :   Vitals:    06/14/24 1014   Temp: 97.9 °F (36.6 °C)   TempSrc: Temporal   Weight: 107 kg (236 lb 11.2 oz)   Height: 165.1 cm (65\")   PainSc:   4   PainLoc: Knee     General: Alert and Oriented x 3, No acute distress.  Psych: mood and " affect appropriate; recent and remote memory intact  Eyes: conjunctiva clear; pupils equally round and reactive, sclera nonicteric  CV: no peripheral edema  Resp: normal respiratory effort  Skin: no rashes or wounds; normal turgor  Musculosketetal; pain and crepitance with knee range of motion  Vascular: palpable distal pulses    ROM:     Xrays:  -4 views (AP, lateral, and sunrise) were reviewed demonstrating end-stage OA with bone on bone articulation.  -A full length AP xray was ordered today for purposes of operative alignment demonstrating end stage arthritic findings. There are no previous full length films for review    Assessment:  End-stage Right knee osteoarthritis. Conservative measures have failed.      Plan:  The plan is to proceed with Right Total Knee Replacement. The patient voiced understanding of the risks, benefits, and alternative forms of treatment that were discussed with Dr Santos at the time of scheduling.  Labs and EKG reviewed.  Patient has done her joint class.    BMI is under 40: 39.39.  Skin does not show any acute abrasions.    On Current Norco 10.  Plan norco 7.5 1-2 tabs every 4 hours.    Proceed with same-day outpatient right total knee replacement surgery.    Devin Santos MD  6/14/2024

## 2024-06-14 NOTE — H&P
Patient: Aurora Bull        YOB: 1975    Medical Record Number: [id number]    Admitting Physician: Dr. Devin Santos    Reason for Admission: End Stage Right Knee OA    History of Present Illness: 48 y.o. female presents with severe end stage knee osteoarthritis which has not been responsive to the full compliment of conservative measures. Despite conservative attempts, there is still severe, constant activity limiting pain. Given the severity of the pain, the patient has elected to proceed with knee replacement.    Allergies: No Known Allergies      Current Medications:  Home Medications:    No current facility-administered medications on file prior to encounter.     Current Outpatient Medications on File Prior to Encounter   Medication Sig    albuterol sulfate  (90 Base) MCG/ACT inhaler As Needed.    fluvoxaMINE (LUVOX) 100 MG tablet Take 1 tablet by mouth 2 (Two) Times a Day.    fluvoxaMINE (LUVOX) 50 MG tablet Take 1 tablet by mouth Every Night. Take in addition to 100 mg tablet    ipratropium-albuterol (COMBIVENT RESPIMAT)  MCG/ACT inhaler Inhale 1 puff 4 (Four) Times a Day As Needed for Wheezing. (Patient taking differently: Inhale 1 puff Daily.)    lamoTRIgine (LaMICtal) 150 MG tablet Take 1 tablet by mouth Daily. (Patient taking differently: Take 1 tablet by mouth Every Night.)    OLANZapine zydis (zyPREXA) 5 MG disintegrating tablet Place 1 tablet on the tongue Every Night. dissolve one tablet by mouth for increase psychosis may repeat in 30 minutes if needed. Limit 2 tablets a day (Patient taking differently: Place 1 tablet on the tongue At Night As Needed. dissolve one tablet by mouth for increase psychosis may repeat in 30 minutes if needed. Limit 2 tablets a day     TAKES PRN)    prazosin (MINIPRESS) 5 MG capsule Take 1 capsule by mouth Every Night.    ziprasidone (GEODON) 20 MG capsule Take 1 capsule by mouth Every Night.    ziprasidone (GEODON) 80 MG capsule Take 1  capsule by mouth 2 (Two) Times a Day. (Patient taking differently: Take 1 capsule by mouth 2 (Two) Times a Day. TAKES ADDITIONAL 20MG AT BEDTIME)     PRN Meds:.    PMH:     Past Medical History:   Diagnosis Date    Asthma     Chronic diarrhea     Chronic knee pain     bilateral    Chronic lower back pain     Depression     Dislocated elbow     History of COVID-19     History of sleep apnea     SINCE WEIGHT LOSS    Kidney stones     Knee swelling 2014    Low back strain 2017    Neck strain 2017    Osgood-Schlatter's disease 1988    Osteoarthritis     PTSD (post-traumatic stress disorder) 2014    Right knee pain     Schizoaffective disorder        PF/Surg/Soc Hx:     Past Surgical History:   Procedure Laterality Date    ADENOIDECTOMY      CARDIAC CATHETERIZATION       SECTION      CHOLECYSTECTOMY      COLONOSCOPY  2019    COLONOSCOPY N/A 2022    Procedure: COLONOSCOPY, BIOPSIES, POLYPECTOMY;  Surgeon: Jerald Keller MD;  [place] [facility] [place]  Service: Gastroenterology;  Laterality: N/A;  POLYP X 1    ENDOSCOPY      ENDOSCOPY N/A 2022    Procedure: ESOPHAGOGASTRODUODENOSCOPY WITH BIOPSIES;  Surgeon: Jerald Keller MD;  [place] [facility] [place]  Service: Gastroenterology;  Laterality: N/A;  ESOPHAGITIS, HIATAL HERNIA      GASTRIC SLEEVE LAPAROSCOPIC  2014    HYSTERECTOMY  2019    KNEE CARTILAGE SURGERY Right 2015    KNEE SURGERY Right 2016    scope    NERVE BLOCK Bilateral 3/21/2022    Procedure: KNEE GENICULAR NERVE BLOCK UNDER FLUORO bilateral;  Surgeon: Emily Smith MD;  [place] [facility] [place] [facility] [place]  Service: Pain Management;  Laterality: Bilateral;    TONSILLECTOMY      UPPER GASTROINTESTINAL ENDOSCOPY          Social History     Occupational History    Not on file   Tobacco Use    Smoking status: Former     Current packs/day: 1.50     Average packs/day: 1.5 packs/day for 34.5 years (51.7 ttl pk-yrs)     Types: Cigarettes     Start date:       Passive exposure: Past    Smokeless tobacco: Never    Tobacco comments:     Quit in 2014   Vaping Use    Vaping status: Never Used   Substance and Sexual Activity    Alcohol use: Not Currently     Alcohol/week: 2.0 standard drinks of alcohol     Comment: ONCE A MONTH    Drug use: Not Currently    Sexual activity: Yes     Birth control/protection: Hysterectomy      Social History     Social History Narrative    Not on file        Family History   Problem Relation Age of Onset    Diabetes Mother     Diabetes Father     Hyperlipidemia Father     Heart disease Father     Hypertension Father     Hyperlipidemia Brother     Diabetes Brother     Heart block Brother     Seasonal affective disorder Brother     Drug abuse Brother     Mental illness Brother     Alcohol abuse Brother     Scoliosis Son     Diabetes Maternal Aunt     Dwarfism Maternal Uncle     Stroke Maternal Uncle     Hypertension Paternal Uncle     Mental illness Maternal Grandmother     Diabetes Maternal Grandmother     Memory loss Maternal Grandmother     Esophageal cancer Maternal Grandfather     Alzheimer's disease Paternal Grandmother     Hypertension Paternal Grandmother     Hypertension Paternal Grandfather     Heart murmur Paternal Grandfather     Colon cancer Neg Hx     Colon polyps Neg Hx     Crohn's disease Neg Hx     Irritable bowel syndrome Neg Hx     Ulcerative colitis Neg Hx     Malig Hyperthermia Neg Hx          Review of Systems:   A 14 point review of systems was performed, pertinent positives discussed above, all other systems are negative    Physical Exam: 48 y.o. female  Vital Signs :   There were no vitals filed for this visit.    General: Alert and Oriented x 3, No acute distress.  Psych: mood and affect appropriate; recent and remote memory intact  Eyes: conjunctiva clear; pupils equally round and reactive, sclera nonicteric  CV: no peripheral edema  Resp: normal respiratory effort  Skin: no rashes or wounds; normal  turgor  Musculosketetal; pain and crepitance with knee range of motion  Vascular: palpable distal pulses    ROM:     Xrays:  -4 views (AP, lateral, and sunrise) were reviewed demonstrating end-stage OA with bone on bone articulation.  -A full length AP xray was ordered today for purposes of operative alignment demonstrating end stage arthritic findings. There are no previous full length films for review    Assessment:  End-stage Right knee osteoarthritis. Conservative measures have failed.      Plan:  The plan is to proceed with Right Total Knee Replacement. The patient voiced understanding of the risks, benefits, and alternative forms of treatment that were discussed with Dr Santos at the time of scheduling.  Labs and EKG reviewed.  Patient has done her joint class.    BMI is under 40: 39.39.  Skin does not show any acute abrasions.    On Current Norco 10.  Plan norco 7.5 1-2 tabs every 4 hours.    Proceed with same-day outpatient right total knee replacement surgery.    Devin Santos MD  6/14/2024      This note was copied and pasted from most recent office visit.  Interval addendum may be made to update this documentation as needed.    Devin Santos MD

## 2024-06-18 ENCOUNTER — DOCUMENTATION (OUTPATIENT)
Dept: HOME HEALTH SERVICES | Facility: HOME HEALTHCARE | Age: 49
End: 2024-06-18
Payer: MEDICARE

## 2024-06-18 ENCOUNTER — APPOINTMENT (OUTPATIENT)
Dept: GENERAL RADIOLOGY | Facility: HOSPITAL | Age: 49
End: 2024-06-18
Payer: MEDICARE

## 2024-06-18 ENCOUNTER — HOME HEALTH ADMISSION (OUTPATIENT)
Dept: HOME HEALTH SERVICES | Facility: HOME HEALTHCARE | Age: 49
End: 2024-06-18
Payer: COMMERCIAL

## 2024-06-18 ENCOUNTER — READMISSION MANAGEMENT (OUTPATIENT)
Dept: CALL CENTER | Facility: HOSPITAL | Age: 49
End: 2024-06-18
Payer: MEDICARE

## 2024-06-18 ENCOUNTER — HOSPITAL ENCOUNTER (OUTPATIENT)
Facility: HOSPITAL | Age: 49
Discharge: HOME | End: 2024-06-18
Attending: ORTHOPAEDIC SURGERY | Admitting: ORTHOPAEDIC SURGERY
Payer: MEDICARE

## 2024-06-18 ENCOUNTER — ANESTHESIA (OUTPATIENT)
Dept: PERIOP | Facility: HOSPITAL | Age: 49
End: 2024-06-18
Payer: MEDICARE

## 2024-06-18 ENCOUNTER — ANESTHESIA EVENT (OUTPATIENT)
Dept: PERIOP | Facility: HOSPITAL | Age: 49
End: 2024-06-18
Payer: MEDICARE

## 2024-06-18 VITALS
OXYGEN SATURATION: 96 % | SYSTOLIC BLOOD PRESSURE: 122 MMHG | TEMPERATURE: 97.8 F | DIASTOLIC BLOOD PRESSURE: 86 MMHG | HEART RATE: 97 BPM | RESPIRATION RATE: 18 BRPM

## 2024-06-18 DIAGNOSIS — M17.11 PRIMARY OSTEOARTHRITIS OF RIGHT KNEE: Primary | ICD-10-CM

## 2024-06-18 DIAGNOSIS — M17.11 ARTHRITIS OF RIGHT KNEE: ICD-10-CM

## 2024-06-18 PROCEDURE — C1776 JOINT DEVICE (IMPLANTABLE): HCPCS | Performed by: ORTHOPAEDIC SURGERY

## 2024-06-18 PROCEDURE — 25010000002 MAGNESIUM SULFATE PER 500 MG OF MAGNESIUM: Performed by: NURSE ANESTHETIST, CERTIFIED REGISTERED

## 2024-06-18 PROCEDURE — 25810000003 LACTATED RINGERS SOLUTION: Performed by: ORTHOPAEDIC SURGERY

## 2024-06-18 PROCEDURE — 25010000002 ONDANSETRON PER 1 MG: Performed by: NURSE ANESTHETIST, CERTIFIED REGISTERED

## 2024-06-18 PROCEDURE — 27447 TOTAL KNEE ARTHROPLASTY: CPT | Performed by: SPECIALIST/TECHNOLOGIST, OTHER

## 2024-06-18 PROCEDURE — 25810000003 SODIUM CHLORIDE 0.9 % SOLUTION: Performed by: ORTHOPAEDIC SURGERY

## 2024-06-18 PROCEDURE — 63710000001 OXYCODONE-ACETAMINOPHEN 7.5-325 MG TABLET: Performed by: NURSE ANESTHETIST, CERTIFIED REGISTERED

## 2024-06-18 PROCEDURE — 25010000002 MORPHINE PER 10 MG: Performed by: ORTHOPAEDIC SURGERY

## 2024-06-18 PROCEDURE — 25010000002 SUGAMMADEX 200 MG/2ML SOLUTION: Performed by: NURSE ANESTHETIST, CERTIFIED REGISTERED

## 2024-06-18 PROCEDURE — 25010000002 ROPIVACAINE PER 1 MG: Performed by: ANESTHESIOLOGY

## 2024-06-18 PROCEDURE — 25010000002 HYDROMORPHONE PER 4 MG: Performed by: NURSE ANESTHETIST, CERTIFIED REGISTERED

## 2024-06-18 PROCEDURE — 25010000002 PROPOFOL 10 MG/ML EMULSION: Performed by: NURSE ANESTHETIST, CERTIFIED REGISTERED

## 2024-06-18 PROCEDURE — 25010000002 ROPIVACAINE PER 1 MG: Performed by: ORTHOPAEDIC SURGERY

## 2024-06-18 PROCEDURE — 27447 TOTAL KNEE ARTHROPLASTY: CPT | Performed by: ORTHOPAEDIC SURGERY

## 2024-06-18 PROCEDURE — G0378 HOSPITAL OBSERVATION PER HR: HCPCS

## 2024-06-18 PROCEDURE — 25010000002 FENTANYL CITRATE (PF) 50 MCG/ML SOLUTION: Performed by: ANESTHESIOLOGY

## 2024-06-18 PROCEDURE — 97530 THERAPEUTIC ACTIVITIES: CPT

## 2024-06-18 PROCEDURE — 25010000002 KETOROLAC TROMETHAMINE PER 15 MG: Performed by: ORTHOPAEDIC SURGERY

## 2024-06-18 PROCEDURE — 25010000002 LIDOCAINE 1 % SOLUTION: Performed by: ANESTHESIOLOGY

## 2024-06-18 PROCEDURE — 25010000002 VANCOMYCIN 10 G RECONSTITUTED SOLUTION: Performed by: ORTHOPAEDIC SURGERY

## 2024-06-18 PROCEDURE — 25010000002 EPINEPHRINE 1 MG/ML SOLUTION 30 ML VIAL: Performed by: ORTHOPAEDIC SURGERY

## 2024-06-18 PROCEDURE — 73560 X-RAY EXAM OF KNEE 1 OR 2: CPT

## 2024-06-18 PROCEDURE — 25010000002 CEFAZOLIN PER 500 MG: Performed by: ORTHOPAEDIC SURGERY

## 2024-06-18 PROCEDURE — 25810000003 LACTATED RINGERS PER 1000 ML: Performed by: ANESTHESIOLOGY

## 2024-06-18 PROCEDURE — 25010000002 ACETAMINOPHEN 10 MG/ML SOLUTION: Performed by: NURSE ANESTHETIST, CERTIFIED REGISTERED

## 2024-06-18 PROCEDURE — A9270 NON-COVERED ITEM OR SERVICE: HCPCS | Performed by: NURSE ANESTHETIST, CERTIFIED REGISTERED

## 2024-06-18 PROCEDURE — 25010000002 FENTANYL CITRATE (PF) 100 MCG/2ML SOLUTION: Performed by: NURSE ANESTHETIST, CERTIFIED REGISTERED

## 2024-06-18 PROCEDURE — 25010000002 DEXAMETHASONE PER 1 MG: Performed by: ANESTHESIOLOGY

## 2024-06-18 PROCEDURE — 63710000001 PREGABALIN 75 MG CAPSULE: Performed by: ORTHOPAEDIC SURGERY

## 2024-06-18 PROCEDURE — 97161 PT EVAL LOW COMPLEX 20 MIN: CPT

## 2024-06-18 PROCEDURE — A9270 NON-COVERED ITEM OR SERVICE: HCPCS | Performed by: ORTHOPAEDIC SURGERY

## 2024-06-18 PROCEDURE — 25010000002 MIDAZOLAM PER 1 MG: Performed by: ANESTHESIOLOGY

## 2024-06-18 PROCEDURE — C1713 ANCHOR/SCREW BN/BN,TIS/BN: HCPCS | Performed by: ORTHOPAEDIC SURGERY

## 2024-06-18 DEVICE — GENESIS II BICONVEX PATELLA 23MM
Type: IMPLANTABLE DEVICE | Site: KNEE | Status: FUNCTIONAL
Brand: GENESIS II

## 2024-06-18 DEVICE — GENESIS II NON-POROUS TIBIAL                                    BASEPLATE SIZE 2 RIGHT
Type: IMPLANTABLE DEVICE | Site: KNEE | Status: FUNCTIONAL
Brand: GENESIS II

## 2024-06-18 DEVICE — LEGION NARROW CRUCIATE RETAINING                                    OXINIUM SIZE 4N RIGHT
Type: IMPLANTABLE DEVICE | Site: KNEE | Status: FUNCTIONAL
Brand: LEGION

## 2024-06-18 DEVICE — PALACOS® R IS A FAST-CURING, RADIOPAQUE, POLY(METHYL METHACRYLATE)-BASED BONE CEMENT.PALACOS ® R CONTAINS THE X-RAY CONTRAST MEDIUM ZIRCONIUM DIOXIDE. TO IMPROVE VISIBILITY IN THE SURGICAL FIELD PALACOS ® R HAS BEEN COLOURED WITH CHLOROPHYLL (E141). THE BONE CEMENT IS PREPARED DIRECTLY BEFORE USE BY MIXING A POLYMER POWDER COMPONENT WITH A LIQUID MONOMER COMPONENT. A DUCTILE DOUGH FORMS WHICH CURES WITHIN A FEW MINUTES.
Type: IMPLANTABLE DEVICE | Site: KNEE | Status: FUNCTIONAL
Brand: PALACOS®

## 2024-06-18 DEVICE — DEV CONTRL TISS STRATAFIX SYMM PDS PLUS VIL CT-1 60CM: Type: IMPLANTABLE DEVICE | Site: KNEE | Status: FUNCTIONAL

## 2024-06-18 DEVICE — LEGION CR HIGH FLEX XLPE SZ 1-2 10MM
Type: IMPLANTABLE DEVICE | Site: KNEE | Status: FUNCTIONAL
Brand: LEGION

## 2024-06-18 DEVICE — DEV CONTRL TISS STRATAFIXSPIRALMNCRYL PLSPS2 REV3/0 45CM: Type: IMPLANTABLE DEVICE | Site: KNEE | Status: FUNCTIONAL

## 2024-06-18 DEVICE — IMPLANTABLE DEVICE: Type: IMPLANTABLE DEVICE | Status: FUNCTIONAL

## 2024-06-18 RX ORDER — LABETALOL HYDROCHLORIDE 5 MG/ML
5 INJECTION, SOLUTION INTRAVENOUS
Status: DISCONTINUED | OUTPATIENT
Start: 2024-06-18 | End: 2024-06-18 | Stop reason: HOSPADM

## 2024-06-18 RX ORDER — ASPIRIN 81 MG/1
TABLET ORAL
Qty: 60 TABLET | Refills: 0 | Status: SHIPPED | OUTPATIENT
Start: 2024-06-18

## 2024-06-18 RX ORDER — FENTANYL CITRATE 50 UG/ML
50 INJECTION, SOLUTION INTRAMUSCULAR; INTRAVENOUS ONCE AS NEEDED
Status: COMPLETED | OUTPATIENT
Start: 2024-06-18 | End: 2024-06-18

## 2024-06-18 RX ORDER — ASPIRIN 81 MG/1
TABLET ORAL
Qty: 60 TABLET | Refills: 0 | Status: CANCELLED | OUTPATIENT
Start: 2024-06-18

## 2024-06-18 RX ORDER — ROCURONIUM BROMIDE 10 MG/ML
INJECTION, SOLUTION INTRAVENOUS AS NEEDED
Status: DISCONTINUED | OUTPATIENT
Start: 2024-06-18 | End: 2024-06-18 | Stop reason: SURG

## 2024-06-18 RX ORDER — FLUMAZENIL 0.1 MG/ML
0.2 INJECTION INTRAVENOUS AS NEEDED
Status: DISCONTINUED | OUTPATIENT
Start: 2024-06-18 | End: 2024-06-18 | Stop reason: HOSPADM

## 2024-06-18 RX ORDER — PANTOPRAZOLE SODIUM 40 MG/1
40 TABLET, DELAYED RELEASE ORAL DAILY
Qty: 14 TABLET | Refills: 0 | Status: CANCELLED | OUTPATIENT
Start: 2024-06-18 | End: 2024-07-02

## 2024-06-18 RX ORDER — PROPOFOL 10 MG/ML
VIAL (ML) INTRAVENOUS AS NEEDED
Status: DISCONTINUED | OUTPATIENT
Start: 2024-06-18 | End: 2024-06-18 | Stop reason: SURG

## 2024-06-18 RX ORDER — MAGNESIUM SULFATE HEPTAHYDRATE 500 MG/ML
INJECTION, SOLUTION INTRAMUSCULAR; INTRAVENOUS AS NEEDED
Status: DISCONTINUED | OUTPATIENT
Start: 2024-06-18 | End: 2024-06-18 | Stop reason: SURG

## 2024-06-18 RX ORDER — ONDANSETRON 4 MG/1
4 TABLET, FILM COATED ORAL EVERY 8 HOURS PRN
Qty: 8 TABLET | Refills: 0 | Status: CANCELLED | OUTPATIENT
Start: 2024-06-18

## 2024-06-18 RX ORDER — DOCUSATE SODIUM 100 MG/1
100 CAPSULE, LIQUID FILLED ORAL 2 TIMES DAILY
Qty: 60 CAPSULE | Refills: 0 | Status: CANCELLED | OUTPATIENT
Start: 2024-06-18

## 2024-06-18 RX ORDER — DROPERIDOL 2.5 MG/ML
0.62 INJECTION, SOLUTION INTRAMUSCULAR; INTRAVENOUS
Status: DISCONTINUED | OUTPATIENT
Start: 2024-06-18 | End: 2024-06-18 | Stop reason: HOSPADM

## 2024-06-18 RX ORDER — DEXAMETHASONE SODIUM PHOSPHATE 4 MG/ML
INJECTION, SOLUTION INTRA-ARTICULAR; INTRALESIONAL; INTRAMUSCULAR; INTRAVENOUS; SOFT TISSUE AS NEEDED
Status: DISCONTINUED | OUTPATIENT
Start: 2024-06-18 | End: 2024-06-18 | Stop reason: SURG

## 2024-06-18 RX ORDER — ROPIVACAINE HYDROCHLORIDE 5 MG/ML
INJECTION, SOLUTION EPIDURAL; INFILTRATION; PERINEURAL
Status: COMPLETED | OUTPATIENT
Start: 2024-06-18 | End: 2024-06-18

## 2024-06-18 RX ORDER — HYDROCODONE BITARTRATE AND ACETAMINOPHEN 7.5; 325 MG/1; MG/1
1 TABLET ORAL EVERY 4 HOURS PRN
Status: CANCELLED | OUTPATIENT
Start: 2024-06-18 | End: 2024-06-25

## 2024-06-18 RX ORDER — HYDROMORPHONE HYDROCHLORIDE 1 MG/ML
0.5 INJECTION, SOLUTION INTRAMUSCULAR; INTRAVENOUS; SUBCUTANEOUS
Status: DISCONTINUED | OUTPATIENT
Start: 2024-06-18 | End: 2024-06-18 | Stop reason: HOSPADM

## 2024-06-18 RX ORDER — LIDOCAINE HYDROCHLORIDE 20 MG/ML
INJECTION, SOLUTION INFILTRATION; PERINEURAL AS NEEDED
Status: DISCONTINUED | OUTPATIENT
Start: 2024-06-18 | End: 2024-06-18 | Stop reason: SURG

## 2024-06-18 RX ORDER — TRANEXAMIC ACID 100 MG/ML
INJECTION, SOLUTION INTRAVENOUS AS NEEDED
Status: DISCONTINUED | OUTPATIENT
Start: 2024-06-18 | End: 2024-06-18 | Stop reason: SURG

## 2024-06-18 RX ORDER — HYDROCODONE BITARTRATE AND ACETAMINOPHEN 7.5; 325 MG/1; MG/1
TABLET ORAL
Qty: 42 TABLET | Refills: 0 | Status: CANCELLED | OUTPATIENT
Start: 2024-06-18

## 2024-06-18 RX ORDER — ONDANSETRON 2 MG/ML
4 INJECTION INTRAMUSCULAR; INTRAVENOUS ONCE AS NEEDED
Status: DISCONTINUED | OUTPATIENT
Start: 2024-06-18 | End: 2024-06-18 | Stop reason: HOSPADM

## 2024-06-18 RX ORDER — TRANEXAMIC ACID 100 MG/ML
INJECTION, SOLUTION INTRAVENOUS AS NEEDED
Status: DISCONTINUED | OUTPATIENT
Start: 2024-06-18 | End: 2024-06-18

## 2024-06-18 RX ORDER — ACETAMINOPHEN 325 MG/1
650 TABLET ORAL EVERY 6 HOURS PRN
Status: CANCELLED | OUTPATIENT
Start: 2024-06-18 | End: 2024-06-21

## 2024-06-18 RX ORDER — ACETAMINOPHEN 10 MG/ML
INJECTION, SOLUTION INTRAVENOUS AS NEEDED
Status: DISCONTINUED | OUTPATIENT
Start: 2024-06-18 | End: 2024-06-18 | Stop reason: SURG

## 2024-06-18 RX ORDER — PREGABALIN 75 MG/1
150 CAPSULE ORAL ONCE
Status: COMPLETED | OUTPATIENT
Start: 2024-06-18 | End: 2024-06-18

## 2024-06-18 RX ORDER — HYDROCODONE BITARTRATE AND ACETAMINOPHEN 7.5; 325 MG/1; MG/1
2 TABLET ORAL EVERY 4 HOURS PRN
Status: CANCELLED | OUTPATIENT
Start: 2024-06-18 | End: 2024-06-25

## 2024-06-18 RX ORDER — MELOXICAM 15 MG/1
15 TABLET ORAL ONCE
Status: DISCONTINUED | OUTPATIENT
Start: 2024-06-18 | End: 2024-06-18 | Stop reason: HOSPADM

## 2024-06-18 RX ORDER — ASPIRIN 81 MG/1
81 TABLET ORAL 2 TIMES DAILY
Status: CANCELLED | OUTPATIENT
Start: 2024-06-19 | End: 2024-06-19

## 2024-06-18 RX ORDER — LIDOCAINE HYDROCHLORIDE 10 MG/ML
0.5 INJECTION, SOLUTION INFILTRATION; PERINEURAL ONCE AS NEEDED
Status: COMPLETED | OUTPATIENT
Start: 2024-06-18 | End: 2024-06-18

## 2024-06-18 RX ORDER — DEXAMETHASONE SODIUM PHOSPHATE 4 MG/ML
INJECTION, SOLUTION INTRA-ARTICULAR; INTRALESIONAL; INTRAMUSCULAR; INTRAVENOUS; SOFT TISSUE
Status: COMPLETED | OUTPATIENT
Start: 2024-06-18 | End: 2024-06-18

## 2024-06-18 RX ORDER — EPHEDRINE SULFATE 50 MG/ML
5 INJECTION, SOLUTION INTRAVENOUS ONCE AS NEEDED
Status: DISCONTINUED | OUTPATIENT
Start: 2024-06-18 | End: 2024-06-18 | Stop reason: HOSPADM

## 2024-06-18 RX ORDER — SODIUM CHLORIDE 0.9 % (FLUSH) 0.9 %
3 SYRINGE (ML) INJECTION EVERY 12 HOURS SCHEDULED
Status: DISCONTINUED | OUTPATIENT
Start: 2024-06-18 | End: 2024-06-18 | Stop reason: HOSPADM

## 2024-06-18 RX ORDER — ONDANSETRON 4 MG/1
4 TABLET, ORALLY DISINTEGRATING ORAL EVERY 6 HOURS PRN
Status: CANCELLED | OUTPATIENT
Start: 2024-06-18

## 2024-06-18 RX ORDER — POLYETHYLENE GLYCOL 3350 17 G/17G
17 POWDER, FOR SOLUTION ORAL DAILY
Qty: 238 G | Refills: 0 | Status: SHIPPED | OUTPATIENT
Start: 2024-06-18 | End: 2024-07-02

## 2024-06-18 RX ORDER — HYDRALAZINE HYDROCHLORIDE 20 MG/ML
5 INJECTION INTRAMUSCULAR; INTRAVENOUS
Status: DISCONTINUED | OUTPATIENT
Start: 2024-06-18 | End: 2024-06-18 | Stop reason: HOSPADM

## 2024-06-18 RX ORDER — FENTANYL CITRATE 50 UG/ML
50 INJECTION, SOLUTION INTRAMUSCULAR; INTRAVENOUS
Status: DISCONTINUED | OUTPATIENT
Start: 2024-06-18 | End: 2024-06-18 | Stop reason: HOSPADM

## 2024-06-18 RX ORDER — PROMETHAZINE HYDROCHLORIDE 25 MG/1
12.5 TABLET ORAL EVERY 4 HOURS PRN
Status: CANCELLED | OUTPATIENT
Start: 2024-06-18

## 2024-06-18 RX ORDER — HYDROCODONE BITARTRATE AND ACETAMINOPHEN 5; 325 MG/1; MG/1
1 TABLET ORAL ONCE AS NEEDED
Status: DISCONTINUED | OUTPATIENT
Start: 2024-06-18 | End: 2024-06-18 | Stop reason: HOSPADM

## 2024-06-18 RX ORDER — SODIUM CHLORIDE, SODIUM LACTATE, POTASSIUM CHLORIDE, CALCIUM CHLORIDE 600; 310; 30; 20 MG/100ML; MG/100ML; MG/100ML; MG/100ML
9 INJECTION, SOLUTION INTRAVENOUS CONTINUOUS
Status: DISCONTINUED | OUTPATIENT
Start: 2024-06-18 | End: 2024-06-18 | Stop reason: HOSPADM

## 2024-06-18 RX ORDER — PANTOPRAZOLE SODIUM 40 MG/1
40 TABLET, DELAYED RELEASE ORAL DAILY
Qty: 10 TABLET | Refills: 0 | Status: SHIPPED | OUTPATIENT
Start: 2024-06-18

## 2024-06-18 RX ORDER — FAMOTIDINE 10 MG/ML
20 INJECTION, SOLUTION INTRAVENOUS ONCE
Status: COMPLETED | OUTPATIENT
Start: 2024-06-18 | End: 2024-06-18

## 2024-06-18 RX ORDER — BUPIVACAINE HYDROCHLORIDE AND EPINEPHRINE 2.5; 5 MG/ML; UG/ML
INJECTION, SOLUTION EPIDURAL; INFILTRATION; INTRACAUDAL; PERINEURAL
Status: COMPLETED | OUTPATIENT
Start: 2024-06-18 | End: 2024-06-18

## 2024-06-18 RX ORDER — DIPHENHYDRAMINE HYDROCHLORIDE 50 MG/ML
12.5 INJECTION INTRAMUSCULAR; INTRAVENOUS
Status: DISCONTINUED | OUTPATIENT
Start: 2024-06-18 | End: 2024-06-18 | Stop reason: HOSPADM

## 2024-06-18 RX ORDER — HYDROCODONE BITARTRATE AND ACETAMINOPHEN 7.5; 325 MG/1; MG/1
1-2 TABLET ORAL EVERY 4 HOURS PRN
Qty: 42 TABLET | Refills: 0 | Status: SHIPPED | OUTPATIENT
Start: 2024-06-18

## 2024-06-18 RX ORDER — EPHEDRINE SULFATE 50 MG/ML
INJECTION INTRAVENOUS AS NEEDED
Status: DISCONTINUED | OUTPATIENT
Start: 2024-06-18 | End: 2024-06-18 | Stop reason: SURG

## 2024-06-18 RX ORDER — ASPIRIN 81 MG/1
81 TABLET ORAL EVERY 12 HOURS SCHEDULED
Status: CANCELLED | OUTPATIENT
Start: 2024-06-19

## 2024-06-18 RX ORDER — PROMETHAZINE HYDROCHLORIDE 25 MG/1
25 TABLET ORAL ONCE AS NEEDED
Status: DISCONTINUED | OUTPATIENT
Start: 2024-06-18 | End: 2024-06-18 | Stop reason: HOSPADM

## 2024-06-18 RX ORDER — NALOXONE HCL 0.4 MG/ML
0.2 VIAL (ML) INJECTION AS NEEDED
Status: DISCONTINUED | OUTPATIENT
Start: 2024-06-18 | End: 2024-06-18 | Stop reason: HOSPADM

## 2024-06-18 RX ORDER — FENTANYL CITRATE 50 UG/ML
INJECTION, SOLUTION INTRAMUSCULAR; INTRAVENOUS AS NEEDED
Status: DISCONTINUED | OUTPATIENT
Start: 2024-06-18 | End: 2024-06-18 | Stop reason: SURG

## 2024-06-18 RX ORDER — KETOROLAC TROMETHAMINE 30 MG/ML
15 INJECTION, SOLUTION INTRAMUSCULAR; INTRAVENOUS EVERY 8 HOURS
Status: CANCELLED | OUTPATIENT
Start: 2024-06-18 | End: 2024-06-19

## 2024-06-18 RX ORDER — MELOXICAM 7.5 MG/1
7.5 TABLET ORAL DAILY
Qty: 10 TABLET | Refills: 0 | Status: SHIPPED | OUTPATIENT
Start: 2024-06-18 | End: 2024-06-19

## 2024-06-18 RX ORDER — POLYETHYLENE GLYCOL 3350 17 G/17G
17 POWDER, FOR SOLUTION ORAL DAILY
Qty: 116 G | Refills: 0 | Status: CANCELLED | OUTPATIENT
Start: 2024-06-18 | End: 2024-06-25

## 2024-06-18 RX ORDER — IPRATROPIUM BROMIDE AND ALBUTEROL SULFATE 2.5; .5 MG/3ML; MG/3ML
3 SOLUTION RESPIRATORY (INHALATION) ONCE AS NEEDED
Status: DISCONTINUED | OUTPATIENT
Start: 2024-06-18 | End: 2024-06-18 | Stop reason: HOSPADM

## 2024-06-18 RX ORDER — PROMETHAZINE HYDROCHLORIDE 25 MG/1
25 SUPPOSITORY RECTAL ONCE AS NEEDED
Status: DISCONTINUED | OUTPATIENT
Start: 2024-06-18 | End: 2024-06-18 | Stop reason: HOSPADM

## 2024-06-18 RX ORDER — ONDANSETRON 2 MG/ML
4 INJECTION INTRAMUSCULAR; INTRAVENOUS ONCE AS NEEDED
Status: CANCELLED | OUTPATIENT
Start: 2024-06-18

## 2024-06-18 RX ORDER — OXYCODONE AND ACETAMINOPHEN 7.5; 325 MG/1; MG/1
1 TABLET ORAL EVERY 4 HOURS PRN
Status: DISCONTINUED | OUTPATIENT
Start: 2024-06-18 | End: 2024-06-18 | Stop reason: HOSPADM

## 2024-06-18 RX ORDER — DOCUSATE SODIUM 100 MG/1
100 CAPSULE, LIQUID FILLED ORAL 2 TIMES DAILY
Qty: 60 CAPSULE | Refills: 0 | Status: SHIPPED | OUTPATIENT
Start: 2024-06-18

## 2024-06-18 RX ORDER — ONDANSETRON 2 MG/ML
INJECTION INTRAMUSCULAR; INTRAVENOUS AS NEEDED
Status: DISCONTINUED | OUTPATIENT
Start: 2024-06-18 | End: 2024-06-18 | Stop reason: SURG

## 2024-06-18 RX ORDER — MIDAZOLAM HYDROCHLORIDE 1 MG/ML
1 INJECTION INTRAMUSCULAR; INTRAVENOUS
Status: DISCONTINUED | OUTPATIENT
Start: 2024-06-18 | End: 2024-06-18 | Stop reason: HOSPADM

## 2024-06-18 RX ORDER — SODIUM CHLORIDE 0.9 % (FLUSH) 0.9 %
3-10 SYRINGE (ML) INJECTION AS NEEDED
Status: DISCONTINUED | OUTPATIENT
Start: 2024-06-18 | End: 2024-06-18 | Stop reason: HOSPADM

## 2024-06-18 RX ORDER — MELOXICAM 15 MG/1
15 TABLET ORAL DAILY
Qty: 10 TABLET | Refills: 0 | Status: SHIPPED | OUTPATIENT
Start: 2024-06-18 | End: 2024-06-19

## 2024-06-18 RX ADMIN — FAMOTIDINE 20 MG: 10 INJECTION INTRAVENOUS at 06:53

## 2024-06-18 RX ADMIN — FENTANYL CITRATE 25 MCG: 50 INJECTION, SOLUTION INTRAMUSCULAR; INTRAVENOUS at 09:13

## 2024-06-18 RX ADMIN — ONDANSETRON 4 MG: 2 INJECTION INTRAMUSCULAR; INTRAVENOUS at 08:48

## 2024-06-18 RX ADMIN — HYDROMORPHONE HYDROCHLORIDE 0.5 MG: 1 INJECTION, SOLUTION INTRAMUSCULAR; INTRAVENOUS; SUBCUTANEOUS at 09:41

## 2024-06-18 RX ADMIN — ACETAMINOPHEN 1000 MG: 10 INJECTION INTRAVENOUS at 07:12

## 2024-06-18 RX ADMIN — SODIUM CHLORIDE, POTASSIUM CHLORIDE, SODIUM LACTATE AND CALCIUM CHLORIDE: 600; 310; 30; 20 INJECTION, SOLUTION INTRAVENOUS at 06:58

## 2024-06-18 RX ADMIN — VANCOMYCIN HYDROCHLORIDE 1750 MG: 10 INJECTION, POWDER, LYOPHILIZED, FOR SOLUTION INTRAVENOUS at 06:30

## 2024-06-18 RX ADMIN — OXYCODONE AND ACETAMINOPHEN 1 TABLET: 7.5; 325 TABLET ORAL at 09:53

## 2024-06-18 RX ADMIN — ROPIVACAINE HYDROCHLORIDE 15 ML: 5 INJECTION EPIDURAL; INFILTRATION; PERINEURAL at 06:45

## 2024-06-18 RX ADMIN — SODIUM CHLORIDE 2 G: 900 INJECTION INTRAVENOUS at 06:51

## 2024-06-18 RX ADMIN — DEXAMETHASONE SODIUM PHOSPHATE 10 MG: 4 INJECTION, SOLUTION INTRA-ARTICULAR; INTRALESIONAL; INTRAMUSCULAR; INTRAVENOUS; SOFT TISSUE at 07:12

## 2024-06-18 RX ADMIN — ROCURONIUM BROMIDE 50 MG: 10 INJECTION, SOLUTION INTRAVENOUS at 07:06

## 2024-06-18 RX ADMIN — DEXAMETHASONE SODIUM PHOSPHATE 2 MG: 4 INJECTION, SOLUTION INTRA-ARTICULAR; INTRALESIONAL; INTRAMUSCULAR; INTRAVENOUS; SOFT TISSUE at 06:45

## 2024-06-18 RX ADMIN — SUGAMMADEX 200 MG: 100 INJECTION, SOLUTION INTRAVENOUS at 09:11

## 2024-06-18 RX ADMIN — MIDAZOLAM 1 MG: 1 INJECTION INTRAMUSCULAR; INTRAVENOUS at 06:46

## 2024-06-18 RX ADMIN — FENTANYL CITRATE 50 MCG: 50 INJECTION, SOLUTION INTRAMUSCULAR; INTRAVENOUS at 06:46

## 2024-06-18 RX ADMIN — LIDOCAINE HYDROCHLORIDE 0.5 ML: 10 INJECTION, SOLUTION INFILTRATION; PERINEURAL at 06:20

## 2024-06-18 RX ADMIN — PROPOFOL 25 MCG/KG/MIN: 10 INJECTION, EMULSION INTRAVENOUS at 07:15

## 2024-06-18 RX ADMIN — MAGNESIUM SULFATE HEPTAHYDRATE 2 G: 500 INJECTION, SOLUTION INTRAMUSCULAR; INTRAVENOUS at 07:12

## 2024-06-18 RX ADMIN — BUPIVACAINE HYDROCHLORIDE AND EPINEPHRINE BITARTRATE 5 ML: 2.5; .005 INJECTION, SOLUTION EPIDURAL; INFILTRATION; INTRACAUDAL; PERINEURAL at 06:45

## 2024-06-18 RX ADMIN — EPHEDRINE SULFATE 5 MG: 50 INJECTION INTRAVENOUS at 07:58

## 2024-06-18 RX ADMIN — PROPOFOL 170 MG: 10 INJECTION, EMULSION INTRAVENOUS at 07:06

## 2024-06-18 RX ADMIN — FENTANYL CITRATE 25 MCG: 50 INJECTION, SOLUTION INTRAMUSCULAR; INTRAVENOUS at 09:23

## 2024-06-18 RX ADMIN — PREGABALIN 150 MG: 75 CAPSULE ORAL at 06:26

## 2024-06-18 RX ADMIN — EPHEDRINE SULFATE 5 MG: 50 INJECTION INTRAVENOUS at 07:45

## 2024-06-18 RX ADMIN — SODIUM CHLORIDE, POTASSIUM CHLORIDE, SODIUM LACTATE AND CALCIUM CHLORIDE 500 ML: 600; 310; 30; 20 INJECTION, SOLUTION INTRAVENOUS at 06:20

## 2024-06-18 RX ADMIN — FENTANYL CITRATE 50 MCG: 50 INJECTION, SOLUTION INTRAMUSCULAR; INTRAVENOUS at 07:32

## 2024-06-18 RX ADMIN — TRANEXAMIC ACID 1000 MG: 100 INJECTION, SOLUTION INTRAVENOUS at 08:28

## 2024-06-18 RX ADMIN — LIDOCAINE HYDROCHLORIDE 100 MG: 20 INJECTION, SOLUTION INFILTRATION; PERINEURAL at 07:06

## 2024-06-18 NOTE — ANESTHESIA PREPROCEDURE EVALUATION
Anesthesia Evaluation     Patient summary reviewed   no history of anesthetic complications:   NPO Solid Status: > 8 hours  NPO Liquid Status: > 2 hours           Airway   Mallampati: II  TM distance: >3 FB  Neck ROM: full  No difficulty expected  Dental      Pulmonary     breath sounds clear to auscultation  (+) a smoker Former, asthma,  (-) shortness of breath, recent URISleep apnea: STOP BANG 1.  Cardiovascular     ECG reviewed  Rhythm: regular  Rate: normal    (-) past MI, dysrhythmias, angina    ROS comment: 3/2022 MPI - Interpretation Summary  · Left ventricular ejection fraction is normal. (Calculated EF = 66%).  · Myocardial perfusion imaging indicates a normal myocardial perfusion study with no evidence of ischemia.  · Impressions are consistent with a low risk study.  · This is a stress only study.    Neuro/Psych  (+) psychiatric history (Schizoaffective disorder) Anxiety, Depression and PTSD  (-) seizures, CVA  GI/Hepatic/Renal/Endo    (+) morbid obesity, GERD, renal disease (Cr 1.08 ; h/o)- stones  (-) diabetes    Musculoskeletal     (+) chronic pain  (-) neck stiffness  Abdominal    Substance History      OB/GYN          Other   arthritis,     (-) blood dyscrasia (Hb 14.0)                Anesthesia Plan    ASA 3     general     (+ACB USGSS for POPC     I have reviewed the patient's history and chart with the patient, including all pertinent laboratory results and imaging. I have explained the risks of anesthesia including but not limited to dental damage, corneal abrasion, nerve injury, MI, stroke, aspiration, and death. Patient has agreed to proceed.      Risks of peripheral nerve block were discussed with patient including but not limited to: inadequate block, bleeding, infection, persistent numbness or weakness, nerve damage, painful dysasthesia and need to protect limb while numb.)        CODE STATUS:

## 2024-06-18 NOTE — OUTREACH NOTE
Prep Survey      Flowsheet Row Responses   [facility] facility patient discharged from? [facility]   Is LACE score < 7 ? Yes   Eligibility [facility]   Date of Admission 06/18/24   Date of Discharge 06/18/24   Discharge Disposition Home or Self Care   Discharge diagnosis Right total knee arthroplasty   Does the patient have one of the following disease processes/diagnoses(primary or secondary)? Total Joint Replacement   Does the patient have Home health ordered? Yes   What is the Home health agency?  [facility]   Is there a DME ordered? No   Prep survey completed? Yes            LYLE CHAPMAN - Registered Nurse

## 2024-06-18 NOTE — OP NOTE
Name: Aurora Bull    YOB: 1975    DATE OF SURGERY: 6/18/2024    PREOPERATIVE DIAGNOSIS: Right knee end-stage osteoarthritis    POSTOPERATIVE DIAGNOSIS: Right knee end-stage osteoarthritis    PROCEDURE PERFORMED: Right total knee replacement     Surgical Approach: Knee Medial Parapatellar     SURGEON: Devin Santos M.D.    ASSISTANT: ADAM GREEN    A surgical assistant was integral in ensuring a successful outcome with this procedure.  The assistant was utilized to assist in positioning the patient, draping the patient, was used throughout the case to provide with retraction of tissues, suctioning of blood and body fluids for visualization, positioning of the extremity to allow for proper exposure so that I could perform the procedure.  Without the use of a surgical assistant during this procedure I feel that the outcome may have been compromised or would have been suboptimal or at risk for complications.    IMPLANTS: [employer]:     Implant Name Type Inv. Item Serial No.  Lot No. LRB No. Used Action   CMT BONE PALACOS R HI/VISC 1X40 - [id number] Implant CMT BONE PALACOS R HI/VISC 1X40  [facility] [id number] Right 1 Implanted   DEV CONTRL TISS STRATAFIX SYMM PDS PLUS СЕРГЕЙ CT-1 60CM - [id number] Implant DEV CONTRL TISS STRATAFIX SYMM PDS PLUS СЕРГЕЙ CT-1 60CM  [employer]  DIV [employer] [id number] Right 1 Implanted   DEV CONTRL TISS STRATAFIXSPIRALMNCRYL PLSPS2 REV3/0 45CM - [id number] Implant DEV CONTRL TISS STRATAFIXSPIRALMNCRYL PLSPS2 REV3/0 45CM  [employer]  DIV OF J AND J [id number] Right 1 Implanted   BASE TIB/KN GEN2 NONPOR TI SZ2 RT - [id number] Implant BASE TIB/KN GEN2 NONPOR TI SZ2 RT  [employer] [id number] Right 1 Implanted   PAT GEN2 BICONVEX [id number] - [id number] Implant PAT GEN2 BICONVEX [id number]  [employer] [id number] Right 1 Implanted   COMP FEM LEGION OXINIUM CR NRW  SZ4N RT - [id number] Implant COMP FEM LEGION OXINIUM CR NRW  SZ4N RT  [employer]  [id number] Right 1 Implanted   INSRT ART/KN LEGION CR HF XLPE SZ1TO2 10MM - [id number] Implant INSRT ART/KN LEGION CR HF XLPE SZ1TO2 10MM  [employer] [id number] Right 1 Implanted       Estimated Blood Loss: 100cc  Specimens : none  Complications: none    DESCRIPTION OF PROCEDURE: The patient was taken to the operating room and placed in the supine position. A sequential compression device was carefully placed on the non-operative leg. Preoperative antibiotics were administered. Surgical time out was performed. After adequate induction of anesthesia, the leg was prepped and draped in the usual sterile fashion, exsanguinated with an Esmarch bandage and the tourniquet inflated to 250 mmHg. A midline incision was performed followed by a medial parapatellar arthrotomy. The patella was subluxed laterally.  A portion of the fat pad, ACL, and anterior horns of the meniscus were excised.  A drill hole was then placed in the center of the femoral canal in line with the canal.  It was irrigated and suctioned.  The intramedullary shari was then placed and a 5 degree distal valgus cut was performed after the block was pinned in place appropriately.  The cut surface was then removed.  The sizing and rotation guide was then placed and seated appropriately.  It was sized and then the drill holes for the 4-in-1 cutting guide were placed in 3 degrees of external rotation based off of the posterior condyles.  The 4-in-1 cutting block was then placed and the femoral cuts were performed.  The excess bone was removed and the cut surfaces looked good.  At this point we placed the retractors around the proximal tibia and a slight release of the PCL fibers off of the posterior proximal tibia was performed.  We used the extramedullary tibial alignment guide and it was aligned appropriately and then the depth was set and the block pinned in place.  Took 9 off the high side.  I added attention slow.  The tibial cut was then performed and the  alignment guide was removed.  The tibial cut was removed and the cut surface looked good.  Small posterior bone block at the insertion of the PCL was left.  The posterior horns of the menisci were then removed as well as the posterior osteophytes.  Flexion extension blocks were then used to check the balance of the knee. The tibial cut surface was then sized with the sizing templates and the tibial and femoral trial were then placed. The knee was placed in full extension and then the tibial tray rotation was then matched to the femoral rotation and marked.    Attention was then placed to the patella. The patella was noted to track centrally through range of motion. The patella was then sized with the trials. The thickness of the patella was then measured. The patella was resurfaced and the surrounding osteophytes were removed. The preoperative thickness was reproduced. The patella tracked centrally through range of motion.  We then checked the balance with the trial implants in place and there was excellent medial lateral and flexion-extension balance.     At this point all trial components were removed, the knee was copiously irrigated with pulsed lavage, and the knee was injected with anesthetic cocktail solution. The cut surfaces were then dried with clean lap sponges, and the components were cemented tibia, followed by femur, then patella. The knee was held in full extension and all excess cement was removed. The knee was held still until the cement had completely hardened.  The tourniquet was then released. Bleeders were carefully cauterized with cautery.  There was excellent hemostasis.  We then checked the knee again in both flexion and extension with the trial polyethylene spacer in place.  Range of motion was found to be excellent.  Tracking of the patella is midline.  The trial polyethylene spacer was removed and the final polyethylene was placed.  Again we checked range of motion and stability in both  flexion and extension which was found to be excellent with central patellar tracking.   The knee was then copiously irrigated.  We closed the knee in multiple layers in standard fashion. Sterile dressing were applied. At the end of the case, the sponge and needle counts were reported as being correct. There were no known complications. The patient was then transported to the recovery room.      Devin Santos M.D.

## 2024-06-18 NOTE — DISCHARGE PLACEMENT REQUEST
"Adal Bull (48 y.o. Female)       Date of Birth   1975    Social Security Number       Address   [address] [place] [address]    Home Phone   [phone]    MRN   [id number]       Zoroastrian   Patient Refused    Marital Status                               Admission Date   6/18/24    Admission Type   Elective    Admitting Provider   Devin Santos MD    Attending Provider   Devin Santos MD    Department, Room/Bed   [facility] OR, [facility] OR/[facility] OR       Discharge Date       Discharge Disposition       Discharge Destination                                 Attending Provider: Devin Santos MD    Allergies: No Known Allergies    Isolation: None   Infection: None   Code Status: Not on file    Ht: 165.1 cm (65\")   Wt: 107 kg (236 lb 11.2 oz)    Admission Cmt: None   Principal Problem: Arthritis of right knee [M17.11]                   Active Insurance as of 6/18/2024       Primary Coverage       Payor Plan Insurance Group Employer/Plan Group    ANTHEM MEDICARE REPLACEMENT ANTHEM MEDICARE ADVANTAGE [id number]       Payor Plan Address Payor Plan Phone Number Payor Plan Fax Number Effective Dates    [address] [phone]  1/1/2024 - None Entered    [facility] [address]         Subscriber Name Subscriber Birth Date Member ID       ADAL BULL 1975 [id number]                     Emergency Contacts        (Rel.) Home Phone Work Phone Mobile Phone    JorgitoChristopher (Spouse) [phone] -- [phone]    TAISHAJOEL (Father) [phone] -- --                "

## 2024-06-18 NOTE — DISCHARGE SUMMARY
Patient Name: Aurora Bull  Patient YOB: 1975    Date of Admission:  6/18/2024  Date of Discharge:  6/18/2024  Discharge Diagnosis: PA ARTHRP KNE CONDYLE&PLATU MEDIAL&LAT COMPARTMENTS [29840] (Right TOTAL KNEE ARTHROPLASTY)  Presenting Problem/History of Present Illness: Arthritis of right knee [M17.11]  Osteoarthritis of right knee [M17.11]  Admitting Physician: Dr Devin Santos  Consults:   Consults       No orders found from 5/20/2024 to 6/19/2024.            DETAILS OF HOSPITAL STAY:  Patient is a 48 y.o. female was admitted to the floor following the above procedure and underwent an uncomplicated hospital stay.  Patient did well with physical therapy and was ambulating well without problems.  On the day of discharge the wound was clean, dry and intact and calf was soft and nontender and Homans sign was negative.  Patient was tolerating  without problems.  Patient will be discharged home.    Condition on Discharge:  Stable    Vital Signs  Temp:  [97.7 °F (36.5 °C)-97.8 °F (36.6 °C)] 97.8 °F (36.6 °C)  Heart Rate:  [] 97  Resp:  [14-20] 18  BP: (105-145)/() 122/86    LABS:      No visits with results within 3 Day(s) from this visit.   Latest known visit with results is:   Pre-Admission Testing on 06/06/2024   Component Date Value Ref Range Status    Glucose 06/06/2024 102 (H)  65 - 99 mg/dL Final    BUN 06/06/2024 7  6 - 20 mg/dL Final    Creatinine 06/06/2024 1.08 (H)  0.57 - 1.00 mg/dL Final    Sodium 06/06/2024 139  136 - 145 mmol/L Final    Potassium 06/06/2024 3.4 (L)  3.5 - 5.2 mmol/L Final    Chloride 06/06/2024 104  98 - 107 mmol/L Final    CO2 06/06/2024 23.0  22.0 - 29.0 mmol/L Final    Calcium 06/06/2024 9.1  8.6 - 10.5 mg/dL Final    BUN/Creatinine Ratio 06/06/2024 6.5 (L)  7.0 - 25.0 Final    Anion Gap 06/06/2024 12.0  5.0 - 15.0 mmol/L Final    eGFR 06/06/2024 63.9  >60.0 mL/min/1.73 Final    WBC 06/06/2024 3.63  3.40 - 10.80 10*3/mm3 Final    RBC 06/06/2024 4.85   3.77 - 5.28 10*6/mm3 Final    Hemoglobin 06/06/2024 14.0  12.0 - 15.9 g/dL Final    Hematocrit 06/06/2024 41.7  34.0 - 46.6 % Final    MCV 06/06/2024 86.0  79.0 - 97.0 fL Final    MCH 06/06/2024 28.9  26.6 - 33.0 pg Final    MCHC 06/06/2024 33.6  31.5 - 35.7 g/dL Final    RDW 06/06/2024 13.0  12.3 - 15.4 % Final    RDW-SD 06/06/2024 40.2  37.0 - 54.0 fl Final    MPV 06/06/2024 10.1  6.0 - 12.0 fL Final    Platelets 06/06/2024 200  140 - 450 10*3/mm3 Final       No results found.    Discharge Medications     Discharge Medications        New Medications        Instructions Start Date   Aspirin Low Dose 81 MG EC tablet  Generic drug: aspirin   Take 1 tablet by mouth twice daily for 2 weeks then 1 tablet daily for 4 weeks.      docusate sodium 100 MG capsule  Commonly known as: COLACE   100 mg, Oral, 2 Times Daily      HYDROcodone-acetaminophen 7.5-325 MG per tablet  Commonly known as: Norco  Replaces: HYDROcodone-acetaminophen  MG per tablet   1-2 tablets, Oral, Every 4 Hours PRN, 1 tablet for moderate pain, 2 tablets for severe pain      meloxicam 7.5 MG tablet  Commonly known as: MOBIC   Take 1 tablet by mouth Daily with food starting the day after surgery.      meloxicam 15 MG tablet  Commonly known as: MOBIC   15 mg, Oral, Daily      pantoprazole 40 MG EC tablet  Commonly known as: PROTONIX   40 mg, Oral, Daily      polyethylene glycol 17 GM/SCOOP powder  Commonly known as: MIRALAX   Mix one capful (17 g) in liquid and take by mouth Daily for 7 days.             Changes to Medications        Instructions Start Date   lamoTRIgine 150 MG tablet  Commonly known as: LaMICtal  What changed: when to take this   150 mg, Oral, Daily             Continue These Medications        Instructions Start Date   albuterol sulfate  (90 Base) MCG/ACT inhaler  Commonly known as: PROVENTIL HFA;VENTOLIN HFA;PROAIR HFA   As Needed.      fluvoxaMINE 100 MG tablet  Commonly known as: LUVOX   100 mg, Oral, 2 Times Daily       fluvoxaMINE 50 MG tablet  Commonly known as: LUVOX   50 mg, Oral, Nightly, Take in addition to 100 mg tablet       ipratropium-albuterol  MCG/ACT inhaler  Commonly known as: COMBIVENT RESPIMAT   1 puff, Inhalation, 4 Times Daily PRN      OLANZapine zydis 5 MG disintegrating tablet  Commonly known as: zyPREXA   5 mg, Translingual, Nightly, dissolve one tablet by mouth for increase psychosis may repeat in 30 minutes if needed. Limit 2 tablets a day       prazosin 5 MG capsule  Commonly known as: MINIPRESS   5 mg, Oral, Nightly      ziprasidone 80 MG capsule  Commonly known as: GEODON   80 mg, Oral, 2 Times Daily      ziprasidone 20 MG capsule  Commonly known as: GEODON   20 mg, Oral, Nightly             Stop These Medications      HYDROcodone-acetaminophen  MG per tablet  Commonly known as: NORCO  Replaced by: HYDROcodone-acetaminophen 7.5-325 MG per tablet              Activity at Discharge:   Activity Instructions       Discharge Activity      [facility] - Orthopedics              Devin Santos MD      [address] [place] [address]               [phone]                     Total Knee Replacement /Uni Compartmental Replacement Discharge Instructions:    I. ACTIVITIES:  1. Exercises:  Complete exercise program as taught by the hospital physical therapist 2 times per day  Exercise program will be advanced by the physical therapist  During the day be up ambulating every 2 hours (while awake) for short distances  Complete the ankle pump exercises at least 10 times per hour (while awake)  Elevate legs most of the day the first week post operatively and thereafter elevate legs when in bed and for at least 30 minutes during the day. Caution must be taken to avoid pillow placement under the bend of the knee as this can led to flexion contractures of the knee.  Use cold packs 20-30 minutes approximately 5 times per day. This should be done before and  after completing your exercises and at any time you are experiencing pain/ stiffness in your operative extremity.    2. Activities of Daily Living:  No tub baths, hot tubs, or swimming pools for 4 weeks  May shower and let water run over the dressing/incision on post-operative day #3 if no drainage. Do not scrub or rub the incision. Simply let the water run over the dressing/incision and pat dry.    II. Restrictions  Do not cross legs or kneel  Your surgeon will discuss with you when you will be able to drive again.  Weight bearing is as tolerated  First week stay inside on even terrain. May go up and down stairs one stair at a time utilizing the hand rail  After one week, you may venture outside.    III. Precautions:  Everyone that comes near you should wash their hands  No elective dental, genital-urinary, or colon procedures or surgical procedures for 12 weeks after surgery unless absolutely necessary.   If dental work or surgical procedure is deemed absolutely necessary, you will need to contact your surgeon as you will need to take antibiotics 1 hour prior to any dental work (including teeth cleanings).  Please discuss with your surgeon prophylactic antibiotics as the length of time this intervention will be necessary for you varies with each patient's health history and situation.  Avoid sick people. If you must be around someone who is ill, they should wear a mask.  Avoid visits to the Emergency Room or Urgent Care unless you are having a life threatening event.   If ordered stockings are to be placed on in the morning and removed at night. Monitor the stockings to ensure that any swelling is not causing the stockings to become too tight. In this case, remove stockings immediately.    IV. INCISION CARE:  Do not change the dressing unless instructed by your physician.The dressing should be left in place until seen in the office.  The suction unit stops functioning (typically 7 days) and battery may be removed,  but leave dressing intact.   The dressing is waterproof and the patient may shower immediately following discharge. If the dressing becomes disloged or saturated it should be changed. Please refer to the [facility] information sheet if you have any questions about the dressing.  If you have a home health nurse or therapist they can be contacted to assist with dressing change or repair. You may also call the [facility] dressing hotline for questions related to the dressing ([phone]). If there still other problems or questions related to the dressing despite these measures then you can contact Catarina at our office [phone].    Wash your hands prior to dressing changes      No creams or ointments to the incision  Do not touch or pick at the incision  Check dressing/incision every day and notify surgeon immediately if any of the following signs or symptoms are noted:  Increase in redness  Increase in swelling around the incision and of the entire extremity  Increase in pain  Drainage oozing from the incision  Pulling apart of the edges of the incision  Increase in overall body temperature (greater than 100.5 degrees)  Your surgeon will instruct you regarding suture or staple removal    V. Medications:   1. Anticoagulants: You will be discharged on an anticoagulant. This is a prophylactic medication that helps prevent blood clots during your post-operative period. The type and length of dosage varies based on your individual needs, procedure performed, and surgeon's preference.  While taking the anticoagulant, you should avoid taking any additional aspirin, ibuprofen (Advil or Motrin), Aleve (Naprosyn) or other non-steroidal anti-inflammatory medications.   Notify surgeon immediately if any rajesh bleeding is noted in the urine, stool, emesis, or from the nose or the incision. Blood in the stool will often appear as black rather than red. Blood in urine may appear as pink. Blood in emesis may appear as brown/black like coffee  grounds.  You will need to apply pressure for longer periods of time to any cuts or abrasions to stop bleeding  Avoid alcohol while taking anticoagulants    2. Stool Softeners: You will be at greater risk of constipation after surgery due to being less mobile and the pain medications.   Take stool softeners as instructed by your surgeon while on pain medications. Over the counter Colace 100 mg 1-2 capsules twice daily.   If stools become too loose or too frequent, please decreases the dosage or stop the stool softener.  If constipation occurs despite use of stool softeners, you are to continue the stool softeners and add a laxative (Milk of Magnesia 1 ounce daily as needed)  Drink plenty of fluids, and eat fruits and vegetables during your recovery time    3. Pain Medications utilized after surgery are narcotics and the law requires that the following information be given to all patients that are prescribed narcotics:  CLASSIFICATION: Pain medications are called Opioids and are narcotics  LEGALITIES: It is illegal to share narcotics with others and to drive within 24 hours of taking narcotics  POTENTIAL SIDE EFFECTS: Potential side effects of opioids include: nausea, vomiting, itching, dizziness, drowsiness, dry mouth, constipation, and difficulty urinating.  POTENTIAL ADVERSE EFFECTS:   Opioid tolerance can develop with use of pain medications and this simply means that it requires more and more of the medication to control pain; however, this is seen more in patients that use opioids for longer periods of time.  Opioid dependence can develop with use of Opioids and this simply means that to stop the medication can cause withdrawal symptoms; however, this is seen with patients that use Opioids for longer periods of time.  Opioid addiction can develop with use of Opioids and the incidence of this is very unlikely in patients who take the medications as ordered and stop the medications as instructed.  Opioid overdose  can be dangerous, but is unlikely when the medication is taken as ordered and stopped when ordered. It is important not to mix opioids with alcohol or with and type of sedative such as Benadryl as this can lead to over sedation and respiratory difficulty.  DOSAGE:   Pain medications will need to be taken consistently for the first week to decrease pain and promote adequate pain relief and participation in physical therapy.  After the initial surgical pain begins to resolve, you may begin to decrease the pain medication. By the end of 6 weeks, you should be off of pain medications.  Refills will not be given by the office during evening hours, on weekends, or after 6 weeks post-op.  To seek refills on pain medications during the initial 6 week post-operative period, you must call the office 48 hours in advance to request the refill. The office will then notify you when to  the prescription. DO NOT wait until you are out of the medication to request a refill.    VI. FOLLOW-UP VISITS:  You will need to follow up in the office with Dr. Devin Santos in 2 weeks. Please call this number [phone] ([place])  [phone] ([place]) to schedule this appointment.  If you have any concerns or suspected complications prior to your follow up visit, please call your surgeons office. Do not wait until your appointment time if you suspect complications. These will need to be addressed in the office promptly.    Patient May Shower; No Tub Baths, Pools or Hot Tubs      POD #3    Range of Motion      Weight Bearing As Tolerated      Weight Bearing Status      Weight Bearing Status: As Tolerated            Discharge Instructions: Patient is to continue with physical therapy exercises daily and continue working with the physical therapist as ordered. Patient may weight bear as tolerated. Apply ice regularly. Patient may ice for long periods of time as long as ice is not directly on the skin. Patient instructed on  frequent calf pumping exercises.  Patient also instructed on incentive spirometer during hospitalization and encouraged to continue to use at home regularly.    Dressing: The dressing is designed to be left in place until you return to the office in 2 weeks.  The suction unit should stop functioning at 7 days and the green light will switch to yellow.  At that point the suction unit and tubing can be disconnected at the port closest to the dressing.  The suction unit and tubing may be discarded.  You may shower immediately upon return home, you will need to turn the pump off by depressing the orange button once and then you may disconnect the pump and tubing at the connection port.  After showering, shake off the excess water and reattach the tubing.  Restart the pump by depressing the orange button one time and you will notice the green light flashing again.  If the dressing becomes disloged or saturated it should be changed. Please refer to the [facility] information sheet if you have any questions about the dressing.  If you have a home health nurse or therapist they can be contacted to assist with dressing change or repair. You may also call the [facility] dressing hotline for questions related to the dressing ([phone]). If there still other problems or questions related to the dressing despite these measures then you can contact Catarina at our office [phone].  If for some reason the [facility] dressing is removed, after 7 days the wound can be gently cleaned with antibacterial soap then allowed to dry and covered with a dry sterile dressing. The wound should be covered at all times except while showering.  Patient may change dressings daily and prn using sterile 4x4 and paper tape, and should call if any unusual drainage, redness or swelling.*  Follow up appointment in 2 weeks - patient to call the office at [phone] ([place]) to schedule.  Patient will be discharged on aspirin for DVT ppx    Discharge Diagnosis:     Arthritis of right knee    Osteoarthritis of right knee      Follow-up Appointments  Future Appointments   Date Time Provider Department Center   7/5/2024  9:40 AM Devin Hawk MD [facility] [address] [facility]   8/22/2024 12:45 PM Sonia Christiansen, APRN [facility]     Additional Instructions for the Follow-ups that You Need to Schedule       Ambulatory Referral to Home Health   As directed      Notify the office if dressing becomes dislodged or saturated. Do not change unless instructed by provider    Order Comments: Notify the office if dressing becomes dislodged or saturated. Do not change unless instructed by provider    Face to Face Visit Date: 6/18/2024   Follow-up provider for Plan of Care?: I will be treating the patient on an ongoing basis.  Please send me the Plan of Care for signature.   Follow-up provider: DEVIN HAWK [[id number]]   Reason/Clinical Findings: post surgical   Describe mobility limitations that make leaving home difficult: Requires the assistance of another to leave home   Nursing/Therapeutic Services Requested: Physical Therapy   PT orders: Total joint pathway   Frequency: 1 Week 1        Ambulatory Referral to Physical Therapy   As directed      To begin 1-2 weeks after completion of Home health services    Order Comments: To begin 1-2 weeks after completion of Home health services    Specialty needed: POST OP   Follow-up needed: Yes        Apply Ice to Affected Knee Every 2 Hours   As directed      Discharge Follow-up with Specialty: Orthopedics; 2 Weeks   As directed      Specialty: Orthopedics   Follow Up: 2 Weeks   Follow Up Details: Return to the office to see Dr. Devin Hawk        Use Commercial Ice Wrap   As directed                 Devin Hawk MD  06/18/24  16:45 EDT

## 2024-06-18 NOTE — PROGRESS NOTES
Patient is discharging today. Spoke to spouse who is agreeable to home health and has no current home health. Face sheet has correct address ,but spouse requested we please call him first - Christopher- [phone] and patients cell is incorrect on face sheet . Correct number is [phone]. Orders for home health PT in [facility]. Thank you !

## 2024-06-18 NOTE — ANESTHESIA POSTPROCEDURE EVALUATION
Patient: Aurora Bull    Procedure Summary       Date: 06/18/24 Room / Location:  [facility] OR 09 /  [facility]    Anesthesia Start: 0658 Anesthesia Stop: 0930    Procedure: Right TOTAL KNEE ARTHROPLASTY (Right: Knee) Diagnosis:       Arthritis of right knee      (Arthritis of right knee [M17.11])    Surgeons: Devin Santos MD Provider: Adiel Weston DO    Anesthesia Type: general ASA Status: 3            Anesthesia Type: general    Vitals  Vitals Value Taken Time   /70 06/18/24 1045   Temp 36.6 °C (97.8 °F) 06/18/24 1045   Pulse 109 06/18/24 1048   Resp 14 06/18/24 1045   SpO2 89 % 06/18/24 1048   Vitals shown include unfiled device data.        Post Anesthesia Care and Evaluation    Patient location during evaluation: bedside  Patient participation: complete - patient participated  Level of consciousness: awake and alert  Pain score: 0  Pain management: adequate    Airway patency: patent  Anesthetic complications: No anesthetic complications  PONV Status: controlled  Cardiovascular status: acceptable and hemodynamically stable  Respiratory status: acceptable  Hydration status: acceptable    Comments: /67 (BP Location: Right arm, Patient Position: Sitting) Comment (Patient Position): RECLINER  Pulse 108   Temp 36.6 °C (97.8 °F) (Oral)   Resp 18   SpO2 96%     POPC supplied by PNB with continued effect in expected distribution

## 2024-06-18 NOTE — THERAPY EVALUATION
Patient Name: Aurora Bull  : 1975    MRN: [id number]                              Today's Date: 2024       Admit Date: 2024    Visit Dx:     ICD-10-CM ICD-9-CM   1. Primary osteoarthritis of right knee  M17.11 715.16   2. Arthritis of right knee  M17.11 716.96     Patient Active Problem List   Diagnosis    Schizoaffective disorder    Mild intermittent asthma without complication    Vitamin D deficiency    Preinfarction syndrome    Moderate persistent asthma    Iron deficiency anemia    Generalized anxiety disorder    Gastroesophageal reflux disease    Family history of coronary arteriosclerosis    Ex-smoker    Blood in urine    Knee pain    Glaucoma suspect of both eyes    Body mass index 40.0-44.9, adult    Chronic diarrhea    Osteoarthritis of both knees    Chest pain    Nausea and vomiting    Diarrhea    Arthritis of right knee    Osteoarthritis of right knee     Past Medical History:   Diagnosis Date    Asthma     Chronic diarrhea     Chronic knee pain     bilateral    Chronic lower back pain     Depression     Dislocated elbow 1980    History of COVID-19     History of sleep apnea     SINCE WEIGHT LOSS    Kidney stones     Knee swelling 2014    Low back strain 2017    Neck strain 2017    Osgood-Schlatter's disease 1988    Osteoarthritis     PTSD (post-traumatic stress disorder) 2014    Right knee pain     Schizoaffective disorder      Past Surgical History:   Procedure Laterality Date    ADENOIDECTOMY      CARDIAC CATHETERIZATION       SECTION  2004    CHOLECYSTECTOMY  2018    COLONOSCOPY  2019    COLONOSCOPY N/A 2022    Procedure: COLONOSCOPY, BIOPSIES, POLYPECTOMY;  Surgeon: Jerald Keller MD;  Location: [facility] MAIN OR;  Service: Gastroenterology;  Laterality: N/A;  POLYP X 1    ENDOSCOPY  2018    ENDOSCOPY N/A 2022    Procedure: ESOPHAGOGASTRODUODENOSCOPY WITH BIOPSIES;  Surgeon: Jerald Keller MD;  Location: [facility] MAIN OR;  Service:  Gastroenterology;  Laterality: N/A;  ESOPHAGITIS, HIATAL HERNIA      GASTRIC SLEEVE LAPAROSCOPIC  2014    HYSTERECTOMY  2019    KNEE CARTILAGE SURGERY Right 2015    KNEE SURGERY Right 2016    scope    NERVE BLOCK Bilateral 3/21/2022    Procedure: KNEE GENICULAR NERVE BLOCK UNDER FLUORO bilateral;  Surgeon: Emily Smith MD;  [place] [facility] [place];  Service: Pain Management;  Laterality: Bilateral;    TONSILLECTOMY  1988    UPPER GASTROINTESTINAL ENDOSCOPY        General Information       Row Name 06/18/24 1308          Physical Therapy Time and Intention    Document Type evaluation;discharge evaluation/summary  -     Mode of Treatment physical therapy  -       Row Name 06/18/24 1308          General Information    Patient Profile Reviewed yes  -EJ     Prior Level of Function independent:;all household mobility;community mobility;ADL's  -EJ     Existing Precautions/Restrictions no known precautions/restrictions  -     Barriers to Rehab none identified  -       Row Name 06/18/24 1308          Living Environment    People in Home spouse  -       Row Name 06/18/24 1308          Home Main Entrance    Number of Stairs, Main Entrance one  -       Row Name 06/18/24 1308          Cognition    Orientation Status (Cognition) oriented x 4  -       Row Name 06/18/24 1308          Safety Issues, Functional Mobility    Impairments Affecting Function (Mobility) strength;pain;range of motion (ROM)  -               User Key  (r) = Recorded By, (t) = Taken By, (c) = Cosigned By      Initials Name Provider Type    EJ Swathi Mcbride, PT Physical Therapist                   Mobility       Row Name 06/18/24 1312          Bed Mobility    Comment, (Bed Mobility) up in chair  -       Row Name 06/18/24 1312          Sit-Stand Transfer    Sit-Stand [place] (Transfers) standby assist  -     Assistive Device (Sit-Stand Transfers) walker, front-wheeled  -       Row Name 06/18/24 1312          Gait/Stairs  (Locomotion)    [place] Level (Gait) standby assist  -EJ     Assistive Device (Gait) walker, front-wheeled  -EJ     Distance in Feet (Gait) 140  -EJ     Deviations/Abnormal Patterns (Gait) konrad decreased;stride length decreased  -EJ     Comment, (Gait/Stairs) cues for sequencing, no unsteadiness, educated pt on stair traninig- she verbalizes and demos understanding  -EJ               User Key  (r) = Recorded By, (t) = Taken By, (c) = Cosigned By      Initials Name Provider Type    Swathi Brooks, PT Physical Therapist                   Obj/Interventions       Row Name 06/18/24 1314          Range of Motion Comprehensive    General Range of Motion no range of motion deficits identified  -EJ     Comment, General Range of Motion x R Knee  -EJ       [facility] Name 06/18/24 1314          Strength Comprehensive (MMT)    Comment, General Manual Muscle Testing (MMT) Assessment post op weakness  -[facility] Name 06/18/24 1314          Motor Skills    Therapeutic Exercise --  R TKR protocol x 5 reps  -EJ               User Key  (r) = Recorded By, (t) = Taken By, (c) = Cosigned By      Initials Name Provider Type    Swathi Brooks, PT Physical Therapist                   Goals/Plan    No documentation.                  Clinical Impression       [facility] Name 06/18/24 1314          Pain    Pretreatment Pain Rating 0/10 - no pain  -[facility] Name 06/18/24 1314          Plan of Care Review    Plan of Care Reviewed With patient;spouse  -     Outcome Evaluation Pt seen for PT eval this afternoon. SHe is POD 0 R TKR. Pt doing well, denies pain at this time. Pt plans home today w spouse and HHPT. Pt able to stand w SBA and Rwx. She ambulated approx 140 ft. No unsteadiness. Cues for sequence. Pt demos good konrad and gait mechanics. Pt edcuated on stair training. She verbalizes and demonstrates understanding. Pt back in chair at end of session. She tolerated all knee exercises well. All questions answered at this  time. pt w no further concerns. She is safe to DC home from PT standpoint.  -       Row Name 06/18/24 1314          Therapy Assessment/Plan (PT)    Criteria for Skilled Interventions Met (PT) no problems identified which require skilled intervention  -EJ     Therapy Frequency (PT) evaluation only  -       Row Name 06/18/24 1314          Positioning and Restraints    Pre-Treatment Position sitting in chair/recliner  -EJ     Post Treatment Position chair  -EJ     In Chair notified nsg;reclined;call light within reach;encouraged to call for assist;with family/caregiver  -               User Key  (r) = Recorded By, (t) = Taken By, (c) = Cosigned By      Initials Name Provider Type    Swathi Brooks, PT Physical Therapist                   Outcome Measures       Row Name 06/18/24 1317          How much help from another person do you currently need...    Turning from your back to your side while in flat bed without using bedrails? 4  -EJ     Moving from lying on back to sitting on the side of a flat bed without bedrails? 4  -EJ     Moving to and from a bed to a chair (including a wheelchair)? 4  -EJ     Standing up from a chair using your arms (e.g., wheelchair, bedside chair)? 4  -EJ     Climbing 3-5 steps with a railing? 4  -EJ     To walk in hospital room? 4  -EJ     AM-PAC 6 Clicks Score (PT) 24  -EJ     Highest Level of Mobility Goal 8 --> Walked 250 feet or more  -       Row Name 06/18/24 1317          Functional Assessment    Outcome Measure Options AM-PAC 6 Clicks Basic Mobility (PT)  -               User Key  (r) = Recorded By, (t) = Taken By, (c) = Cosigned By      Initials Name Provider Type    Swathi Brooks, PT Physical Therapist                                   PT Recommendation and Plan     Plan of Care Reviewed With: patient, spouse  Outcome Evaluation: Pt seen for PT eval this afternoon. SHe is POD 0 R TKR. Pt doing well, denies pain at this time. Pt plans home today w spouse and  HHPT. Pt able to stand w SBA and Rwx. She ambulated approx 140 ft. No unsteadiness. Cues for sequence. Pt demos good konrad and gait mechanics. Pt edcuated on stair training. She verbalizes and demonstrates understanding. Pt back in chair at end of session. She tolerated all knee exercises well. All questions answered at this time. pt w no further concerns. She is safe to DC home from PT standpoint.     Time Calculation:         PT Charges       Row Name 06/18/24 1317             Time Calculation    Start Time 1249  -EJ      Stop Time 1305  -EJ      Time Calculation (min) 16 min  -EJ      PT Received On 06/18/24  -EJ         Time Calculation- PT    Total Timed Code Minutes- PT 12 minute(s)  -EJ                User Key  (r) = Recorded By, (t) = Taken By, (c) = Cosigned By      Initials Name Provider Type    Swathi Brooks, PT Physical Therapist                  Therapy Charges for Today       Code Description Service Date Service Provider Modifiers Qty    63971986444 HC PT EVAL LOW COMPLEXITY 3 6/18/2024 Swathi Mcbride, PT GP 1    [id number]  PT THERAPEUTIC ACT EA 15 MIN 6/18/2024 Swathi Mcbride, PT GP 1            PT G-Codes  Outcome Measure Options: AM-PAC 6 Clicks Basic Mobility (PT)  AM-PAC 6 Clicks Score (PT): 24  PT Discharge Summary  Anticipated Discharge Disposition (PT): home with assist, home with home health    Swathi Mcbride, ANKITA  6/18/2024

## 2024-06-18 NOTE — DISCHARGE INSTRUCTIONS
What to expect after a Nerve Block    Nerve blocks administered to block pain affect many types of nerves, including those nerves that control movement, pain, and normal sensation. Following a nerve block, you may notice some bruising at the site where the block was given. You may experience sensations such as: numbness of the affected area or limb, tingling, heaviness (that is the limb feels heavy to you), weakness or inability to move the affected arm or leg, or a feeling as if your arm or leg has “fallen asleep.”     A nerve block can last from 2 to 36 hours depending on the medications used.  Usually the weakness wears off first followed by the tingling and heaviness. As the block wears off, you may begin to notice pain; however, this sequence of events may occur in any order. Typically, you will be able to move your limb before you will feel it. Once a nerve block begins to wear off, the effects are usually completely gone within 60 minutes.  If you experience continued side effects that you believe are block related for longer than 48 hours, please call your healthcare provider. Please see block-specific instructions below.    Instructions for any Block involving the leg/foot:   If you have had a leg /foot block, you should not bear weight on the affected leg until the block has worn off. After the block has worn off, weight bearing should be as directed by your surgeon. You may be sent home with crutches. You are at high risk for falling because of the anesthetic effects on your leg. Please use caution when standing or trying to move or walk. Have someone assist you until your leg and foot function have returned to normal.     Protection of a “blocked” limb  After a nerve block, you cannot feel pain, pressure, or extremes of temperature in the affected limb. And because of this, your blocked limb is at more risk for injury. For example, it is possible to burn your limb on an extremely hot surface without  feeling it.     When resting, it is important to reposition your limb periodically to avoid prolonged pressure on it. This may require the use of pillows and padding.    While sleeping, you should avoid rolling onto the affected limb or putting too much pressure on it.     If you have a cast or tight dressing, check the color of your fingers or toes of the affected limb. Call your surgeon if they look discolored (that is, dusky, dark colored).    Use caution in cold weather. Cover your limb appropriately to protect it from the cold.    Pain Management:  Your surgeon will give you a prescription for pain medication. Begin taking this before the nerve block wears off. Bear in mind that sometimes the block can wear off in the middle of the night.

## 2024-06-18 NOTE — PLAN OF CARE
Goal Outcome Evaluation:  Plan of Care Reviewed With: patient, spouse           Outcome Evaluation: Pt seen for PT osito this afternoon. SHe is POD 0 R TKR. Pt doing well, denies pain at this time. Pt plans home today w spouse and HHPT. Pt able to stand w SBA and Rwx. She ambulated approx 140 ft. No unsteadiness. Cues for sequence. Pt demos good konrad and gait mechanics. Pt edcuated on stair training. She verbalizes and demonstrates understanding. Pt back in chair at end of session. She tolerated all knee exercises well. All questions answered at this time. pt w no further concerns. She is safe to DC home from PT standpoint.      Anticipated Discharge Disposition (PT): home with assist, home with home health

## 2024-06-18 NOTE — ANESTHESIA PROCEDURE NOTES
Peripheral Block    Pre-sedation assessment completed: 6/18/2024 6:42 AM    Patient reassessed immediately prior to procedure    Patient location during procedure: holding area  Start time: 6/18/2024 6:44 AM  Stop time: 6/18/2024 6:45 AM  Reason for block: at surgeon's request and post-op pain management  Performed by  Anesthesiologist: Adiel Weston DO  Preanesthetic Checklist  Completed: patient identified, IV checked, site marked, risks and benefits discussed, surgical consent, monitors and equipment checked, pre-op evaluation and timeout performed  Prep:  Pt Position: supine  Sterile barriers:gloves, mask, cap and washed/disinfected hands  Prep: ChloraPrep  Patient monitoring: blood pressure monitoring, continuous pulse oximetry and EKG  Procedure    Sedation: yes  Performed under: local infiltration  Guidance:ultrasound guided    ULTRASOUND INTERPRETATION.  Using ultrasound guidance a gauge needle was placed in close proximity to the femoral nerve, at which point, under ultrasound guidance anesthetic was injected in the area of the nerve and spread of the anesthesia was seen on ultrasound in close proximity thereto.  There were no abnormalities seen on ultrasound; a digital image was taken; and the patient tolerated the procedure with no complications. Images:still images obtained, printed/placed on chart    Laterality:right  Block Type:adductor canal block  Injection Technique:single-shot  Needle Type:echogenic  Needle Gauge:22 G  Resistance on Injection: none    Medications Used: dexamethasone (DECADRON) injection - Peripheral Nerve   2 mg - 6/18/2024 6:45:00 AM  ropivacaine (NAROPIN) 0.5 % injection - Peripheral Nerve   15 mL - 6/18/2024 6:45:00 AM  bupivacaine-EPINEPHrine PF (MARCAINE w/EPI) 0.25% -[id number] injection - Peripheral Nerve   5 mL - 6/18/2024 6:45:00 AM      Medications  Comment:Ultrasound Interpretation:  Using ultrasound guidance the needle was placed in close proximity to the  adductor canal and anesthetic was injected in the area of the nerve and spread of the anesthetic was seen on ultrasound in close proximity thereto.  There were no abnormalities seen on ultrasound; a digital image was taken; and the patient tolerated the procedure with no complications.          Post Assessment  Injection Assessment: negative aspiration for heme, no paresthesia on injection and incremental injection  Patient Tolerance:comfortable throughout block  Complications:no  Additional Notes  Ultrasound guidance was used to both view and verify local anesthetic placement and disbursement. In addition, it was used to evaluate the respective anatomy to determine needle approach and placement.  Performed by: Adiel Weston DO             18-Nov-2019 10:55

## 2024-06-18 NOTE — ANESTHESIA PROCEDURE NOTES
Airway  Urgency: elective    Date/Time: 6/18/2024 7:09 AM  Airway not difficult    General Information and Staff    Patient location during procedure: OR  Anesthesiologist: Adiel Weston DO  CRNA/CAA: Tish Boston CRNA    Indications and Patient Condition  Indications for airway management: airway protection    Preoxygenated: yes  MILS not maintained throughout  Mask difficulty assessment: 1 - vent by mask    Final Airway Details  Final airway type: endotracheal airway      Successful airway: ETT  Cuffed: yes   Successful intubation technique: direct laryngoscopy  Facilitating devices/methods: intubating stylet  Endotracheal tube insertion site: oral  Blade: [place]  Blade size: 2  ETT size (mm): 7.0  Cormack-Lehane Classification: grade IIa - partial view of glottis  Placement verified by: chest auscultation and capnometry   Cuff volume (mL): 6  Measured from: teeth  ETT/EBT  to teeth (cm): 20  Number of attempts at approach: 1  Assessment: lips, teeth, and gum same as pre-op and atraumatic intubation    Additional Comments  Airway exam prior to DL, teeth/lips inspected. Preoxygenated with 100% O2; sniffing position, IV induction, eyes taped. Easy mask ventilation. Atraumatic intubation. ETT connected to vent. Confirmed EBBS, +EtCO2. Lips and teeth inspected, intact, no damage.

## 2024-06-19 ENCOUNTER — TRANSITIONAL CARE MANAGEMENT TELEPHONE ENCOUNTER (OUTPATIENT)
Dept: CALL CENTER | Facility: HOSPITAL | Age: 49
End: 2024-06-19
Payer: MEDICARE

## 2024-06-19 ENCOUNTER — HOME CARE VISIT (OUTPATIENT)
Dept: HOME HEALTH SERVICES | Facility: HOME HEALTHCARE | Age: 49
End: 2024-06-19
Payer: COMMERCIAL

## 2024-06-19 VITALS
SYSTOLIC BLOOD PRESSURE: 98 MMHG | HEART RATE: 79 BPM | DIASTOLIC BLOOD PRESSURE: 70 MMHG | OXYGEN SATURATION: 94 % | RESPIRATION RATE: 18 BRPM | TEMPERATURE: 97.9 F

## 2024-06-19 PROCEDURE — G0151 HHCP-SERV OF PT,EA 15 MIN: HCPCS

## 2024-06-19 NOTE — OUTREACH NOTE
Call Center TCM Note      Flowsheet Row Responses   [facility] patient discharged from? [place]   Does the patient have one of the following disease processes/diagnoses(primary or secondary)? Total Joint Replacement   Joint surgery performed? Knee   TCM attempt successful? Yes  [VR-  and father]   Call start time 0847   Call end time 0851   Has the patient been back in either the hospital or Emergency Department since discharge? No   Discharge diagnosis Right total knee arthroplasty   Person spoke with today (if not patient) and relationship    Does the patient have all medications related to this admission filled (includes all antibiotics, pain medications, etc.) Yes   Is the patient taking all medications as directed (includes completed medication regime)? Yes   Is the patient able to teach back alternate methods of pain control? Ice   Comments  reports Pt will call office for HOSP [place] FU .   Does the patient have an appointment with their PCP within 7-14 days of discharge? No   Nursing Interventions Patient declined scheduling/rescheduling appointment at this time, Routed TCM call to PCP office   What is the [facility] health agency[facility]  [facility]   Has home health visited the patient within 72 hours of discharge? Call prior to 72 hours   Psychosocial issues? No   If the patient has started attending therapy, what post op day did they begin to attend (either in home or as an out patient)?   today HH coming   Has the patient fallen since discharge? No   Did the patient receive a copy of their discharge instructions? Yes   Nursing interventions Reviewed instructions with patient   What is the patient's perception of their functional status since discharge? Improving   Is the patient able to teach back signs and symptoms of infection? Temp >100.4 for 24h or longer, Incisional drainage, Blisters around incision, Increased swelling or redness around incision (not associated with surgical edema),  Severe discomfort or pain, Shortness of breath or chest pain   Is the patient able to teach back how to prevent infection? Shower only as directed by surgeon, No tub baths, hot tub or swimming, Check incision daily   Is the patient able to teach back signs and symptoms of DVT? Redness in calf, Swelling in calf, Severe pain in calf, Area hot to touch, Shortness of breath or chest pain   Is the patient able to teach back home safety measures? Ability to shower   If the patient is a current smoker, are they able to teach back resources for cessation? Not a smoker   Is the patient/caregiver able to teach back the hierarchy of who to call/visit for symptoms/problems? PCP, Specialist, Home health nurse, Urgent Care, ED, 911 Yes   TCM call completed? Yes   Wrap up additional comments  reports Pt is doing ok. has post op appt 7/5/24. Pt will call PCP office for appt.   Call end time [id number]            Araceli Hollis RN    6/19/2024, 08:51 EDT

## 2024-06-19 NOTE — Clinical Note
Greetings,     The PT start of care was performed 6/19/24.  Here is a brief summary of my visit.    CAREGIVER:  Spouse- Christopher Bull & daughter     HOME ENVIRONMENT: Lives with supportive spouse, parents, daughter + 5 dogs in single story home with 2 steps with rail to enter.  Laundry in basement.  Pt typically lives in the basement, however is staying on first level while she recovers.  Part of basement steps do not have a railing.  Step in shower with built in bench & grab bars, high toilets    Pt is s/p (R) TKA on 6/18/24.  There was mild drainage on STEVENSON dressing covering (R) anterior knee incision.  There are no signs of infection & Evens's sign was negative.    Pt is ambulating with a rolling walker with reciprocal gt pattern      Reviewed all medications, surgical aftercare of TKA & home exercise program to be performed twice daily.    (R) Knee ROM: -20* to 105*    Plan to treat 2w1, 3w1.  Orders to be sent to your inbox for authorization if you agree.    Pt has been contacted by [facility] scheduling re &garding plans to attend outpatient therapy at [facility] following DC from [facility].    Home health personnel are required to perform review of medications & notify physicians of medications that have been identified as having severe or major interactions.    The following medications have been triggered:  OLANZapine zydis and ziprasidone & fluvoxaMINE and meloxicam    Please feel free to contact me if you have any questions.  Thank you,         Mery Abbasi, PT        [phone]

## 2024-06-19 NOTE — HOME HEALTH
PHYSICAL THERAPY TOTAL KNEE ARTHROPLASTY EVALUATION     REASON FOR REFERRAL: 48  year old female admitted to [facility] on  24 for elective RIGHT TOTAL KNEE ARTHROPLASTY by Dr. Santos.  She was discharged home on the same date & referred for home health PT services for surgical aftercare of Right TKA, resulting in decreased range of motion and strength in Right knee, impeding functional mobility and gait activities, which prevents patient from safely exiting home for medical appointments.     Pt states her block is still in effect & she's been moving pretty well.  She's been compliant with use of the walker & pain medications every 4 hours.  She reports she normally resides in the basement of her home, however is staying on the main level of the home until she is able to navigate the stairs.     PERTINENT PAST MEDICAL HISTORY:    Asthma    Chronic diarrhea    Chronic (B) knee pain    Chronic lower back pain    Depression    Dislocated elbow   History of sleep apnea SINCE WEIGHT LOSS  Kidney stones    Osgood-Schlatter's disease   Osteoarthritis    PTSD (post-traumatic stress disorder)   Schizoaffective disorder      PERTINENT PAST SURGICAL HISTORY:    ADENOIDECTOMY      SECTION     CHOLECYSTECTOMY     GASTRIC SLEEVE LAPAROSCOPIC  HYSTERECTOMY  RIGHT KNEE CARTILAGE SURGERY 2015  RIGHT KNEE SURGERY ARTHROSCOPE 2016  (B) KNEE GENICULAR NERVE BLOCK UNDER FLUORO   TONSILLECTOMY        CAREGIVER:  Spouse- Christopher Goldterrance & daughter     HOME ENVIRONMENT: Lives with supportive spouse, parents, daughter + 5 dogs in single story home with 2 steps with rail to enter.  Laundry in basement.  Pt typically lives in the basement, however is staying on first level while she recovers.  Part of basement steps do not have a railing.  Step in shower with built in bench & grab bars, high toilets    DME:  FWW, Cane, Rollator, grab bars, built in shower bench     PRIOR LEVEL OF FUNCTION: Independent in self care, transfers &  "ambulation without an assistive device, however using a cane for the past 2 months.     PATIENT GOAL FOR THIS EPISODE OF CARE:  \"I want the best outcome for my knee\".     MENTAL STATUS:  Alert and oriented x 4     EDEMA: Moderate Right knee.  Calf soft & non-tender.  Evens's negative.     WOUND / SKIN CONDITION:  Small amount of serosanguineous drainage on STEVENSON dressing covering Right knee incision     SIGNS SYMPTOMS OF INFECTION:   None     POSTURE:  No gross deformities     MUSCLE TONE/COORDINATION:   WNL    (R) KNEE ROM:  -20* to 105*    STRENGTH GRADES  (R) KNEE:  3+/5.  All other muscles are WNL    BED MOBILITY    SUPINE<>SIT: Supervision     ROLLING/SCOOTING:  Independent    TRANSFERS   IN/OUT OF BED: Supervision  SIT<>STAND: Supervision    TOILET:  Supervision   TUB/SHOWER: CGA    GAIT:  Pt ambulates with rolling walker & SBA x 1, demonstrating reciprocal pattern with decreased (R) knee ROM & decreased step length (L) LE.       TINETTI SCORE:   18/28- indicates FALL RISK  (TINETTI FALL RISK SCORING: Under 19= High  19-24= Moderate  25 & up= Low)    PLAN TO TREAT:  2w1, 3w1                     FOCUS OF CARE:  RIGHT TOTAL KNEE ARTHROPLASTY     MEDICAL NECESSITY FOR ONGOING SKILLED PHYSICAL THERAPY: Patient requires skilled physical therapy services for SURGICAL AFTERCARE OF TOTAL KNEE ARTHROPLASTY for management of lower extremity pain/edema and to address deficits in range of motion, strength and gait activities.  Patient is unable to negotiate stairs to safely exit home to attend medical appointments.     PLAN FOR NEXT VISIT:  Progress HEP, strive to increase ROM, Review post-surgical aftercare, pain/edema control, progressive gait training with increased (R) Knee ROM during swing"

## 2024-06-20 ENCOUNTER — TELEPHONE (OUTPATIENT)
Dept: ORTHOPEDIC SURGERY | Facility: HOSPITAL | Age: 49
End: 2024-06-20
Payer: MEDICARE

## 2024-06-20 NOTE — TELEPHONE ENCOUNTER
Attempted to reach Ms. Bull to see how she is doing as she is POD 2 RTK. Unable to leave message at this time as the MB is full.

## 2024-06-21 ENCOUNTER — HOME CARE VISIT (OUTPATIENT)
Dept: HOME HEALTH SERVICES | Facility: HOME HEALTHCARE | Age: 49
End: 2024-06-21
Payer: MEDICARE

## 2024-06-21 VITALS
DIASTOLIC BLOOD PRESSURE: 90 MMHG | SYSTOLIC BLOOD PRESSURE: 145 MMHG | TEMPERATURE: 98.8 F | RESPIRATION RATE: 18 BRPM | HEART RATE: 73 BPM | OXYGEN SATURATION: 98 %

## 2024-06-21 PROCEDURE — G0151 HHCP-SERV OF PT,EA 15 MIN: HCPCS | Performed by: PHYSICAL THERAPIST

## 2024-06-24 ENCOUNTER — HOME CARE VISIT (OUTPATIENT)
Dept: HOME HEALTH SERVICES | Facility: HOME HEALTHCARE | Age: 49
End: 2024-06-24
Payer: COMMERCIAL

## 2024-06-24 VITALS
TEMPERATURE: 97.9 F | HEART RATE: 102 BPM | DIASTOLIC BLOOD PRESSURE: 96 MMHG | SYSTOLIC BLOOD PRESSURE: 141 MMHG | OXYGEN SATURATION: 99 %

## 2024-06-24 PROCEDURE — G0151 HHCP-SERV OF PT,EA 15 MIN: HCPCS | Performed by: PHYSICAL THERAPIST

## 2024-06-26 ENCOUNTER — HOME CARE VISIT (OUTPATIENT)
Dept: HOME HEALTH SERVICES | Facility: HOME HEALTHCARE | Age: 49
End: 2024-06-26
Payer: COMMERCIAL

## 2024-06-26 VITALS
RESPIRATION RATE: 18 BRPM | TEMPERATURE: 98.9 F | SYSTOLIC BLOOD PRESSURE: 146 MMHG | HEART RATE: 102 BPM | OXYGEN SATURATION: 98 % | DIASTOLIC BLOOD PRESSURE: 97 MMHG

## 2024-06-26 PROCEDURE — G0151 HHCP-SERV OF PT,EA 15 MIN: HCPCS | Performed by: PHYSICAL THERAPIST

## 2024-06-28 ENCOUNTER — HOME CARE VISIT (OUTPATIENT)
Dept: HOME HEALTH SERVICES | Facility: HOME HEALTHCARE | Age: 49
End: 2024-06-28
Payer: MEDICARE

## 2024-06-28 VITALS
RESPIRATION RATE: 18 BRPM | OXYGEN SATURATION: 97 % | SYSTOLIC BLOOD PRESSURE: 130 MMHG | TEMPERATURE: 97.8 F | DIASTOLIC BLOOD PRESSURE: 75 MMHG | HEART RATE: 78 BPM

## 2024-06-28 PROCEDURE — G0151 HHCP-SERV OF PT,EA 15 MIN: HCPCS | Performed by: PHYSICAL THERAPIST

## 2024-06-29 NOTE — HOME HEALTH
"Subjective: \"I am doing well but still have some pain with walking with her cane due to some pain in her knee\"    Changes in Medications: none    Any Falls Since Last Visit: none    Assessment: Patient did well with her exercises and her ambulation but she continues to have some pain in her right knee when walking.  Patient has met her goals and is appropriate for outpatient PT at this time."

## 2024-06-29 NOTE — CASE COMMUNICATION
Dear Dr. Santos,    Mrs. Bull has met her goals and is appropriate for outpatient PT at this time.  Patient has done well with her exercises and her walking with improved knee flexion.    Thank you,  Maricel Aragon, PT, DPT  [facility]

## 2024-07-02 NOTE — PROGRESS NOTES
[facility]    Physical Therapy Initial Evaluation and Plan of Care    Patient Name: Aurora Bull          :  1975  Referring Physician: Devin Santos MD  Diagnosis: S/P TKR (total knee replacement), right [Z96.651]    Date of Evaluation: 2024  ______________________________________________________________________    Subjective Evaluation    History of Present Illness  Mechanism of injury: End-stage OA (R) KNEE  - long history of knee pain since 21 yo fell off a horse onto her knees -   S/P (R) TKA -2024  Surgical Approach: Knee Medial Parapatellar    Since surgery - some pain - tight when walking    Pt reports she could not fully extend her knee before her surgery -         Past Medical History:  · Asthma    · Chronic diarrhea    · Chronic knee pain      bilateral  · Chronic lower back pain    · Depression    · Dislocated elbow   · History of COVID-19    · History of sleep apnea      SINCE WEIGHT LOSS  · Kidney stones    · Knee swelling   · Low back strain   · Neck strain   · Osgood-Schlatter's disease   · Osteoarthritis    · PTSD (post-traumatic stress disorder)   · Right knee pain    · Schizoaffective disorder       Pain  Current pain ratin  At worst pain ratin  Location: (R) knee anteriorly -  Quality: Throbbing / sharp.  Alleviating factors: Elevate / rest.  Exacerbated by: WB-ing RLE -  Progression: improved    Social Support  Patient lives at: home w/ [facility] which is where she normally sleeps and a couple of stairs to get into the house w/ handrails -    Diagnostic Tests  X-ray: normal (Post-op)    Treatments  Previous treatment: physical therapy, injection treatment and medication (21 yo scope;  surgery to re-build cartilage)  Current treatment: home therapy and medication  Patient Goals  Patient/family treatment goals: Pain alleviation; Normal gait, ROM, strength and function including stairs reciprocally.          ___________________________________________________  Objective          Observations     Additional Knee Observation Details  Pt presents ambulating w/ rollator - Dressings in place w/ some dried blood noted -   Flexed (R) knee w/ WS to (L)  Demonstrating flexed knee posturing w/ gait RLE - w/ rollator -     Tenderness     Additional Tenderness Details  Tender around PF jt, med/lat knee/distal femur and prox/distal scar (R) -    Active Range of Motion   Left Knee   Normal active range of motion    Right Knee   Flexion: 90 degrees   Extension: 20 degrees     Strength/Myotome Testing     Left Knee   Flexion: 4+  Extension: 4+  Quadriceps contraction: fair    Right Knee   Flexion: 4-  Extension: 4-  Quadriceps contraction: poor    Additional Strength Details  Hip flexion 4/5 R/L    Tests     Additional Tests Details  The calf is soft and nontender with a negative Homans sign    Swelling     Right Knee Girth Measurement (cm)   Joint line: 2cm > (L)        TREATMENT:   MANUAL THERAPY:   EDEMA MOBILIZATION RLE  STM / DTM to (R) LOWER LEG, POPLITEAL REGION, QUAD   GENTLE ASSISTED STRETCHING into FLEXION / EXTENSION -     EXERCISES:   KNEE HEEL SLIDES w/ GAIT BELT ASSIST 10/20 sec holds Flex/Ext-  SEATED KNEE FLEXION STRETCH w/ OPP LE ASSIST 10/15 sec HOLD and EXT STRETCH   NUSTEP Seat 8 L5 x 5 min FOR ROM   HEP FABRICATION and REVIEWED w/ PATIENT -       FUNCTIONAL ACTIVITIES:   TAPING / BRACING: NA  Anatomy / dysfunction education  Edema control, positioning, etc.   Jt protection, ADL modification; Posture and       KOS: 48/80  ___________________________________________________  Assessment & Plan       Assessment  Impairments: abnormal gait, abnormal muscle firing, abnormal muscle tone, abnormal or restricted ROM, activity intolerance, impaired balance, impaired physical strength, lacks appropriate home exercise program, pain with function and weight-bearing intolerance   Functional limitations: walking,  pulling, pushing, uncomfortable because of pain, moving in bed, sitting, standing, stooping and unable to perform repetitive tasks   Assessment details: 49 yo female S/P (R) TKA -6/18/2024    GOALS;   SHORT TERM GOALS: 6 weeks:    1) HEP Initiated;   2) Pain decreased 50%: at rest and w/ ADLs, PT / Knee ROM, gait, etc  3) AROM  increased:  to -deg  4) Ambulating w/ normal gait pattern w/ appropriate AD -     LONG TERM GOALS: 8-12 weeks (or at time of DISCHARGE):   1) (I) HEP;   2) AROM WFL and pain reduced > 80% to allow normal gait, stairs, ADLs    3) Strength /endurance to be able to perform all ADL's including climbing stairs reciprocally w/ HR going down and walking functional distances for ADLs and exercise w/o AD -   4) Functional Test KOS score improved to > 60/80      Prognosis: good    Plan  Therapy options: will be seen for skilled therapy services  Planned therapy interventions: abdominal trunk stabilization, flexibility, gait training, home exercise program, joint mobilization, functional ROM exercises, manual therapy, neuromuscular re-education, postural training, soft tissue mobilization, strengthening, stretching, therapeutic activities and transfer training (Modalities prn; Taping prn;)  Frequency: 2x week  Duration in weeks: 12  Treatment plan discussed with: patient  Plan details:         ___________________________________________________  Manual Therapy:    10     mins  87380;  Therapeutic Exercise:    10     mins  94447;     Neuromuscular Orville:        mins  61109;    Therapeutic Activity:     10     mins  00508;   Self Care:                           mins  80444  Ultrasound:          mins  18922;  Iontophoresis:          mins  16237;    Electrical Stimulation:         mins  89790 ( );  Mechanical Traction:          mins  57485  Dry Needling          mins self-pay    Eval:   15   mins    Timed Treatment:   30   mins                  Total Treatment:     45   mins    PT SIGNATURE:      Ron Adair, PT  DATE TREATMENT INITIATED: 7/03/2024  ___________________________________________________  Initial Certification  Certification Period: 9/30/2024  I certify that the therapy services are furnished while this patient is under my care.  The services outlined above are required by this patient, and will be reviewed every 90 days.     PHYSICIAN: ________________________________  DATE: ______  Devin Santos MD        Please sign and return via fax to [phone].. Thank you, [facility].  ______________________________________________________________________  [address] [place] [address]  Phone: [phone] Fax: [phone]

## 2024-07-03 ENCOUNTER — TREATMENT (OUTPATIENT)
Dept: PHYSICAL THERAPY | Facility: CLINIC | Age: 49
End: 2024-07-03
Payer: MEDICARE

## 2024-07-03 DIAGNOSIS — Z74.09 IMPAIRED FUNCTIONAL MOBILITY, BALANCE, GAIT, AND ENDURANCE: ICD-10-CM

## 2024-07-03 DIAGNOSIS — Z78.9 DIFFICULTY NAVIGATING STAIRS: ICD-10-CM

## 2024-07-03 DIAGNOSIS — R26.9 GAIT DISTURBANCE: ICD-10-CM

## 2024-07-03 DIAGNOSIS — M25.661 KNEE STIFFNESS, RIGHT: ICD-10-CM

## 2024-07-03 DIAGNOSIS — M25.561 RIGHT KNEE PAIN, UNSPECIFIED CHRONICITY: ICD-10-CM

## 2024-07-03 DIAGNOSIS — Z96.651 S/P TKR (TOTAL KNEE REPLACEMENT), RIGHT: Primary | ICD-10-CM

## 2024-07-05 ENCOUNTER — OFFICE VISIT (OUTPATIENT)
Dept: ORTHOPEDIC SURGERY | Facility: CLINIC | Age: 49
End: 2024-07-05
Payer: MEDICARE

## 2024-07-05 VITALS — WEIGHT: 230.8 LBS | TEMPERATURE: 97.3 F | BODY MASS INDEX: 38.45 KG/M2 | HEIGHT: 65 IN

## 2024-07-05 DIAGNOSIS — Z96.651 S/P TKR (TOTAL KNEE REPLACEMENT), RIGHT: Primary | ICD-10-CM

## 2024-07-05 PROCEDURE — 1159F MED LIST DOCD IN RCRD: CPT | Performed by: ORTHOPAEDIC SURGERY

## 2024-07-05 PROCEDURE — 1160F RVW MEDS BY RX/DR IN RCRD: CPT | Performed by: ORTHOPAEDIC SURGERY

## 2024-07-05 PROCEDURE — 99024 POSTOP FOLLOW-UP VISIT: CPT | Performed by: ORTHOPAEDIC SURGERY

## 2024-07-05 RX ORDER — HYDROCODONE BITARTRATE AND ACETAMINOPHEN 7.5; 325 MG/1; MG/1
1 TABLET ORAL EVERY 4 HOURS PRN
Qty: 30 TABLET | Refills: 0 | Status: SHIPPED | OUTPATIENT
Start: 2024-07-05

## 2024-07-05 NOTE — PROGRESS NOTES
Aurora Bull : 1975 MRN: [id number] DATE: 2024    DIAGNOSIS: 2 week follow up Right total knee      SUBJECTIVE:Patient returns today for 2 week follow up of Right  total knee replacement. Patient reports doing well with no unusual complaints. Appears to be progressing appropriately.    OBJECTIVE:   Exam:. The incision is healing appropriately. No sign of infection. Range of motion is progressing as expected. The calf is soft and nontender with a negative Homans sign.     ASSESSMENT: 2 week status post Right  knee replacement.    PLAN: 1) Dressing removed and steri strips applied   2)  PT (outpatient)    6-week goals active range of motion 0-120.  Full extension is most important motion.  Walk without a limp or device.  Stairs with a reciprocal pattern and no railing assist to ascend.  Rail to use okay to descend.  Single leg stance for more than 10 seconds.  Stand from a 17 inch chair without upper extremity assistance.  No extension lag with straight leg raise.    For physical therapy no resistance on cardio machines for 3 months.  No weight machines for 3 months.  No treadmill for 6 weeks.  No cuff weights greater than 2 pounds in the initial 6 weeks postoperatively.  No more than 5 pounds in the first 3 months.       3) Continue ice PRN   4) aspirin 81 mg orally every day for 1 month   5) Follow up in 4 weeks with repeat Xrays of Right  knee (3views)    Devin Santos MD  2024

## 2024-07-07 NOTE — PROGRESS NOTES
Physical Therapy Daily Note    Patient Name: Aurora Bull         :  1975  Referring Physician: Devin Santos MD  Treatment Date: 2024  Date of Initial Visit: Type: THERAPY  Noted: 7/3/2024    Visit Diagnoses:    ICD-10-CM ICD-9-CM   1. S/P TKR (total knee replacement), right  Z96.651 V43.65   2. Right knee pain, unspecified chronicity  M25.561 719.46   3. Gait disturbance  R26.9 781.2   4. Difficulty navigating stairs  Z78.9 V49.89   5. Knee stiffness, right  M25.661 719.56   6. Impaired functional mobility, balance, gait, and endurance  Z74.09 V49.89     Patient seen for 2 sessions  _____________________________________________________    Subjective   Aurora Bull reports: working on her knee extension a lot, but doesn't feel like she is improving - Saw Dr. Santos who was pleased w/ progress -     Objective   -deg AROM (R) KNEE to start  -deg AROM (R) knee after MT and gentle stretching     Very tight HS and GASTROCS w/ multiple TPs limiting knee extension -     TREATMENT:   MANUAL THERAPY:   STM / DTM / TPRto (R) LOWER LEG, POPLITEAL REGION, QUAD   GENTLE ASSISTED STRETCHING into FLEXION / EXTENSION -      EXERCISES:   KNEE HEEL SLIDES w/ GAIT BELT ASSIST 10/20 sec holds Flex/Ext-  SEATED KNEE FLEXION STRETCH w/ OPP LE ASSIST 10/15 sec HOLD and EXT STRETCH   GASTROC / KNEE EXT STRETCH w/ STRAP 4x 30 sec ea (supine or sitting)  QS w/o roll to emphasize knee extension  SAQ on ROLL x 20 w/ P-H x 15  NUSTEP Seat 8 L5 x 5 min FOR ROM       NMR; Verbal and tactile cues to facilitate quad / vmo, hip, glute, LE musculature statically and dynamically. - Cuing to relax leg for knee ext stretching -  -      Functional / Therapeutic Activities:    TAPING / BRACING: NA  SEE EXERCISEs  Jt protection, ADL modification; Posture and      Assessment/Plan  49 yo female S/P (R) TKA -2024   Long history of knee pain - unable to fully extend her knee prior to her TKA -   VERY STIFF  INTO EXTENSION -   Improved ROM !     Progress strengthening /stabilization /functional activity    Per MD OV note 7/5/2024:   PLAN:  PT (outpatient)                          6-week goals active range of motion 0-120.  Full extension is most important motion.  Walk without a limp or device.  Stairs with a reciprocal pattern and no railing assist to ascend.  Rail to use okay to descend.  Single leg stance for more than 10 seconds.  Stand from a 17 inch chair without upper extremity assistance.  No extension lag with straight leg raise.                          For physical therapy no resistance on cardio machines for 3 months.  No weight machines for 3 months.  No treadmill for 6 weeks.  No cuff weights greater than 2 pounds in the initial 6 weeks postoperatively.  No more than 5 pounds in the first 3 months.                                        3) Continue ice PRN              4) aspirin 81 mg orally every day for 1 month              5) Follow up in 4 weeks with repeat Xrays of Right  knee (3views)     Devin Santos MD  7/5/2024        _________________________________________________    Manual Therapy:            10     mins  49930;  Therapeutic Exercise:    20    mins  54679;     Neuromuscular Orville:   05     mins  27142;    Therapeutic Activity:     10      mins  53075;     Ultrasound:                          mins  49418;  Iontophoresis:                     mins  55363;    Electrical Stimulation:         mins  81860 ( );  Mechanical Traction:          mins  65343  Dry Needling                       mins self-pay     Timed Treatment:   45    mins                  Total Treatment:     45    mins        Ron Adair PT  Physical Therapist  Lic # [id number]    Access Code: [email or web address]  URL: [email or web address]  Date: 07/08/2024  Prepared by: Chay Adair    Exercises  - Supine Heel Slide with Strap  - 3-5 x daily - 7 x weekly - 1 sets - 5-10 reps - 30 sec hold  - Seated Knee Flexion Extension AROM   -  3-5 x daily - 7 x weekly - 1 sets - 15-20 reps - 10-15 sec hold  - Seated Gluteal Sets  - 2-3 x daily - 7 x weekly - 1 sets - 10-15 reps - 5 sec hold  - SHORT ARC QUAD  - 1-2 x daily - 7 x weekly - 1 sets - 20 reps - 10 sec hold  - Gastroc Stretch on Wall  - 2-3 x daily - 7 x weekly - 1 sets - 3-5 reps - 30s hold  - Seated Gastroc Stretch with Strap  - 3 x daily - 7 x weekly - 1 sets - 3 reps - 60 seconds hold

## 2024-07-08 ENCOUNTER — TREATMENT (OUTPATIENT)
Dept: PHYSICAL THERAPY | Facility: CLINIC | Age: 49
End: 2024-07-08
Payer: MEDICARE

## 2024-07-08 DIAGNOSIS — R26.9 GAIT DISTURBANCE: ICD-10-CM

## 2024-07-08 DIAGNOSIS — M25.661 KNEE STIFFNESS, RIGHT: ICD-10-CM

## 2024-07-08 DIAGNOSIS — M25.561 RIGHT KNEE PAIN, UNSPECIFIED CHRONICITY: ICD-10-CM

## 2024-07-08 DIAGNOSIS — Z96.651 S/P TKR (TOTAL KNEE REPLACEMENT), RIGHT: Primary | ICD-10-CM

## 2024-07-08 DIAGNOSIS — Z74.09 IMPAIRED FUNCTIONAL MOBILITY, BALANCE, GAIT, AND ENDURANCE: ICD-10-CM

## 2024-07-08 DIAGNOSIS — Z78.9 DIFFICULTY NAVIGATING STAIRS: ICD-10-CM

## 2024-07-10 ENCOUNTER — TREATMENT (OUTPATIENT)
Dept: PHYSICAL THERAPY | Facility: CLINIC | Age: 49
End: 2024-07-10
Payer: MEDICARE

## 2024-07-10 DIAGNOSIS — M25.661 KNEE STIFFNESS, RIGHT: ICD-10-CM

## 2024-07-10 DIAGNOSIS — M25.561 RIGHT KNEE PAIN, UNSPECIFIED CHRONICITY: ICD-10-CM

## 2024-07-10 DIAGNOSIS — Z74.09 IMPAIRED FUNCTIONAL MOBILITY, BALANCE, GAIT, AND ENDURANCE: ICD-10-CM

## 2024-07-10 DIAGNOSIS — R26.9 GAIT DISTURBANCE: ICD-10-CM

## 2024-07-10 DIAGNOSIS — Z78.9 DIFFICULTY NAVIGATING STAIRS: ICD-10-CM

## 2024-07-10 DIAGNOSIS — Z96.651 S/P TKR (TOTAL KNEE REPLACEMENT), RIGHT: Primary | ICD-10-CM

## 2024-07-10 NOTE — PROGRESS NOTES
Physical Therapy Daily Note    Patient Name: Aurora Bull         :  1975  Referring Physician: Devin Santos MD  Treatment Date: 7/10/2024  Date of Initial Visit: Type: THERAPY  Noted: 7/3/2024    Visit Diagnoses:    ICD-10-CM ICD-9-CM   1. S/P TKR (total knee replacement), right  Z96.651 V43.65   2. Right knee pain, unspecified chronicity  M25.561 719.46   3. Difficulty navigating stairs  Z78.9 V49.89   4. Gait disturbance  R26.9 781.2   5. Knee stiffness, right  M25.661 719.56   6. Impaired functional mobility, balance, gait, and endurance  Z74.09 V49.89     Patient seen for 3 sessions  _____________________________________________________    Subjective   Aurora Bull reports: practicing walking w/o he walker, but not with cane - knee more sore as a result and would like to learn how to do stairs -     Objective   Pt ambulating w/ rollator -  AROM (start); 15 to 105              (After MT and Stretching: 10 to 117-deg (R) knee    TREATMENT:   MANUAL THERAPY:   STM / DTM / TPRt o (R) LOWER LEG, POPLITEAL REGION, QUAD , Ant scar  GENTLE ASSISTED prolonged STRETCHING into FLEXION / EXTENSION -      EXERCISES:   KNEE EXT STRETCH w/ HEEL PROP and under knee/gastroc to control stretch intensity -   KNEE HEEL SLIDES w/ GAIT BELT ASSIST 10/20 sec holds Flex/Ext-  SEATED KNEE FLEXION STRETCH w/ OPP LE ASSIST 10/15 sec HOLD and EXT STRETCH   GASTROC / KNEE EXT STRETCH w/ STRAP 4x 30 sec ea (supine or sitting)  QS w/o roll to emphasize knee extension  SAQ on ROLL x 20 w/ P-H x 15  Ambulation on track w/ straight to assess gait and practice proper pattern x 1 lap  Up / down 2 flights of stairs w/ st cane and HR Up w/ left; Down w/ right  NUSTEP Seat 9-10 L5 x 5 min FOR EXT ROM   OCTANE: Seat 10 for flexion stretch;  Seat 12 for Ext stretch        NMR; Verbal and tactile cues to facilitate quad / vmo, hip, glute, LE musculature statically and dynamically. - Cuing to relax leg for knee ext stretching -  -       Functional / Therapeutic Activities:    TAPING / BRACING: NA  Fitted Pt w/ st cane - Instructed in proper gait pattern / sequence w/ st cane in LUE  SEE EXERCISEs  Education on modifying heel prop stretch to control amount of stretch to more gentle w/ extra towel roll more proximal to maintain gentle stretch - w/o the stretch is too intense and Pt cannot tolerate it and not making gains -        Assessment/Plan  47 yo female S/P (R) TKA -6/18/2024   Long history of knee pain - unable to fully extend her knee prior to her TKA -   VERY STIFF INTO EXTENSION -   Improved ROM !      Progress strengthening /stabilization /functional activity     Per MD OV note 7/5/2024:   PLAN:  PT (outpatient)                          6-week goals active range of motion 0-120.  Full extension is most important motion.  Walk without a limp or device.  Stairs with a reciprocal pattern and no railing assist to ascend.  Rail to use okay to descend.  Single leg stance for more than 10 seconds.  Stand from a 17 inch chair without upper extremity assistance.  No extension lag with straight leg raise.                          For physical therapy no resistance on cardio machines for 3 months.  No weight machines for 3 months.  No treadmill for 6 weeks.  No cuff weights greater than 2 pounds in the initial 6 weeks postoperatively.  No more than 5 pounds in the first 3 months.                                        3) Continue ice PRN              4) aspirin 81 mg orally every day for 1 month              5) Follow up in 4 weeks with repeat Xrays of Right  knee (3views)     Devin Santos MD  7/5/2024        _________________________________________________    Manual Therapy:            25     mins  89107;  Therapeutic Exercise:    15    mins  06854;     Neuromuscular [place]:   05     mins  43136;    Therapeutic Activity:     15      mins  69826;     Ultrasound:                          mins  39970;  Iontophoresis:                     mins   07751;    Electrical Stimulation:         mins  68989 ( );  Mechanical Traction:          mins  37278  Dry Needling                       mins self-pay     Timed Treatment:   60    mins                  Total Treatment:     60    mins        Ron Adair PT  Physical Therapist  Lic # [id number]

## 2024-07-15 ENCOUNTER — TREATMENT (OUTPATIENT)
Dept: PHYSICAL THERAPY | Facility: CLINIC | Age: 49
End: 2024-07-15
Payer: MEDICARE

## 2024-07-15 DIAGNOSIS — Z96.651 S/P TKR (TOTAL KNEE REPLACEMENT), RIGHT: Primary | ICD-10-CM

## 2024-07-15 DIAGNOSIS — M25.661 KNEE STIFFNESS, RIGHT: ICD-10-CM

## 2024-07-15 DIAGNOSIS — R26.9 GAIT DISTURBANCE: ICD-10-CM

## 2024-07-15 DIAGNOSIS — M25.561 RIGHT KNEE PAIN, UNSPECIFIED CHRONICITY: ICD-10-CM

## 2024-07-15 DIAGNOSIS — Z78.9 DIFFICULTY NAVIGATING STAIRS: ICD-10-CM

## 2024-07-15 DIAGNOSIS — Z74.09 IMPAIRED FUNCTIONAL MOBILITY, BALANCE, GAIT, AND ENDURANCE: ICD-10-CM

## 2024-07-15 PROCEDURE — 97110 THERAPEUTIC EXERCISES: CPT | Performed by: PHYSICAL THERAPIST

## 2024-07-15 PROCEDURE — 97140 MANUAL THERAPY 1/> REGIONS: CPT | Performed by: PHYSICAL THERAPIST

## 2024-07-15 PROCEDURE — 97530 THERAPEUTIC ACTIVITIES: CPT | Performed by: PHYSICAL THERAPIST

## 2024-07-15 NOTE — PROGRESS NOTES
Physical Therapy Daily Note    Patient Name: Aurora Bull         :  1975  Referring Physician: Devin Santos MD  Treatment Date: 7/15/2024  Date of Initial Visit: Type: THERAPY  Noted: 7/3/2024    Visit Diagnoses:    ICD-10-CM ICD-9-CM   1. S/P TKR (total knee replacement), right  Z96.651 V43.65   2. Right knee pain, unspecified chronicity  M25.561 719.46   3. Difficulty navigating stairs  Z78.9 V49.89   4. Gait disturbance  R26.9 781.2   5. Knee stiffness, right  M25.661 719.56   6. Impaired functional mobility, balance, gait, and endurance  Z74.09 V49.89     Patient seen for 4 sessions  _____________________________________________________    Subjective   Aurora Bull reports: feels like her knee is getting straighter -     Objective   Pt ambulating w/ st cane - flexed knee posture (R) -     TREATMENT:   MANUAL THERAPY:   STM / DTM / TPRt o (R) LOWER LEG, POPLITEAL REGION, QUAD , Ant scar  GENTLE ASSISTED prolonged STRETCHING into FLEXION / EXTENSION -      EXERCISES:   KNEE EXT STRETCH w/ HEEL PROP and under knee/gastroc to control stretch intensity -   KNEE HEEL SLIDES w/ GAIT BELT ASSIST 10/20 sec holds Flex/Ext-  SEATED KNEE FLEXION STRETCH w/ OPP LE ASSIST 10/15 sec HOLD and EXT STRETCH   GASTROC / KNEE EXT STRETCH w/ STRAP 4x 30 sec ea (supine or sitting)  QS w/o roll to emphasize knee extension  SAQ on ROLL x 20 w/ P-H x 15  Ambulation on track w/ straight to assess gait and practice proper pattern x 1 lap  PRONE KNEE FLEXION / EXTENSION w/ strap - 5 min each  Up / down 2 flights of stairs w/ st cane and HR Up w/ left; Down w/ right  NUSTEP Seat 9-10 L5 x 5 min FOR EXT ROM   OCTANE: Seat 10 for flexion stretch;  Seat 12 for Ext stretch -DEFER TODAY DUE TO TIME        NMR; Verbal and tactile cues to facilitate quad / vmo, hip, glute, LE musculature statically and dynamically. - Cuing to relax leg for knee ext stretching -  -      Functional / Therapeutic Activities:    TAPING / BRACING: K-Tape  ti unload PF jt  SEE EXERCISEs    Assessment/Plan  47 yo female S/P (R) TKA -6/18/2024   Long history of knee pain - unable to fully extend her knee prior to her TKA -   VERY STIFF INTO EXTENSION -   Improved ROM !      Progress strengthening /stabilization /functional activity     Per MD OV note 7/5/2024:   PLAN:  PT (outpatient)                          6-week goals active range of motion 0-120.  Full extension is most important motion.  Walk without a limp or device.  Stairs with a reciprocal pattern and no railing assist to ascend.  Rail to use okay to descend.  Single leg stance for more than 10 seconds.  Stand from a 17 inch chair without upper extremity assistance.  No extension lag with straight leg raise.                          For physical therapy no resistance on cardio machines for 3 months.  No weight machines for 3 months.  No treadmill for 6 weeks.  No cuff weights greater than 2 pounds in the initial 6 weeks postoperatively.  No more than 5 pounds in the first 3 months.                                        3) Continue ice PRN              4) aspirin 81 mg orally every day for 1 month              5) Follow up in 4 weeks with repeat Xrays of Right  knee (3views)     Devin Santos MD  7/5/2024        Progress strengthening /stabilization /functional activity       _________________________________________________    Manual Therapy:            15     mins  20585;  Therapeutic Exercise:   25   mins  22046;     Neuromuscular [place]:   05     mins  31834;    Therapeutic Activity:     10      mins  45594;     Ultrasound:                          mins  67003;  Iontophoresis:                     mins  57742;    Electrical Stimulation:         mins  04958 ( );  Mechanical Traction:          mins  29737  Dry Needling                       mins self-pay     Timed Treatment:   55    mins                  Total Treatment:    55    mins        Ron Adair, PT  Physical Therapist  Lic # [id number]

## 2024-07-16 ENCOUNTER — TELEPHONE (OUTPATIENT)
Dept: ORTHOPEDIC SURGERY | Facility: HOSPITAL | Age: 49
End: 2024-07-16
Payer: MEDICARE

## 2024-07-16 NOTE — TELEPHONE ENCOUNTER
Called and spoke with Mr. Neva Bull's father as he is listed as primary on her chart. I was checking to see how she has been doing since her RTK 6/18. He said she is doing well. She is using a cane now. She is working with PT and continues to make progress. He doesn't have any questions for me at this time. Mr. Hooks has my contact information should they need anything.

## 2024-07-17 ENCOUNTER — TREATMENT (OUTPATIENT)
Dept: PHYSICAL THERAPY | Facility: CLINIC | Age: 49
End: 2024-07-17
Payer: MEDICARE

## 2024-07-17 DIAGNOSIS — M25.661 KNEE STIFFNESS, RIGHT: ICD-10-CM

## 2024-07-17 DIAGNOSIS — Z96.651 S/P TKR (TOTAL KNEE REPLACEMENT), RIGHT: Primary | ICD-10-CM

## 2024-07-17 DIAGNOSIS — M25.561 RIGHT KNEE PAIN, UNSPECIFIED CHRONICITY: ICD-10-CM

## 2024-07-17 DIAGNOSIS — Z78.9 DIFFICULTY NAVIGATING STAIRS: ICD-10-CM

## 2024-07-17 DIAGNOSIS — R26.9 GAIT DISTURBANCE: ICD-10-CM

## 2024-07-17 DIAGNOSIS — Z74.09 IMPAIRED FUNCTIONAL MOBILITY, BALANCE, GAIT, AND ENDURANCE: ICD-10-CM

## 2024-07-18 NOTE — PROGRESS NOTES
Physical Therapy Daily Note    Patient Name: Aurora Bull         :  1975  Referring Physician: Devin Santos MD  Treatment Date: 2024  Date of Initial Visit: Type: THERAPY  Noted: 7/3/2024    Visit Diagnoses:    ICD-10-CM ICD-9-CM   1. S/P TKR (total knee replacement), right  Z96.651 V43.65   2. Right knee pain, unspecified chronicity  M25.561 719.46   3. Difficulty navigating stairs  Z78.9 V49.89   4. Gait disturbance  R26.9 781.2   5. Impaired functional mobility, balance, gait, and endurance  Z74.09 V49.89   6. Knee stiffness, right  M25.661 719.56     Patient seen for 5 sessions  _____________________________________________________    Subjective   Aurora Bull reports: working on knee extension - noting increased anterior knee pain w/ stretching into extension -     Objective   Very tight / tender w/ TPs prox/mid gastrocs and distal / mid HS - Ambulating w/ st cane WBAT RLE -   AROM up to 10 to 120-deg (R) knee -   Anterior knee pain w/ stretching into extension, but alleviated w/ patellar lifting    TREATMENT:   MANUAL THERAPY:   STM / DTM / TPRt o (R) LOWER LEG, POPLITEAL REGION, QUAD , Ant scar  GENTLE ASSISTED prolonged STRETCHING into FLEXION / EXTENSION -      EXERCISES:   [x]   KNEE EXT STRETCH w/ HEEL PROP and under knee/gastroc to control stretch intensity -   [x]   KNEE HEEL SLIDES w/ GAIT BELT /  PT ASSIST 10/20 sec holds Flex/Ext-  []   SEATED KNEE FLEXION STRETCH w/ OPP LE ASSIST 10/15 sec HOLD and EXT STRETCH   [x]   GASTROC / KNEE EXT STRETCH STANDING 2x 60 sec ea (supine or sitting)  []   QS w/o roll to emphasize knee extension  [x]   SAQ on ROLL x 20 w/ P-H x 15  [x]   Ambulation on track w/ straight to assess gait and practice proper pattern x 1 lap  [x]   PRONE KNEE FLEXION / EXTENSION w/ strap - 5 min each  [x]   Up / down 2 flights of stairs w/ st cane and HR Up w/ left; Down w/ right  [x]   NUSTEP Seat 9-10 L5 x 5 min FOR EXT ROM   []   OCTANE: Seat 10 for flexion  stretch;  Seat 12 for Ext stretch -DEFER TODAY DUE TO TIME        NMR; Verbal and tactile cues to facilitate quad / vmo, hip, glute, LE musculature statically and dynamically. - Cuing to relax leg for knee ext stretching -  -      Functional / Therapeutic Activities:    TAPING / BRACING: K-Tape ti unload PF jt x 2 layers  SEE EXERCISEs    Assessment/Plan  49 yo female S/P (R) TKA -6/18/2024   Long history of knee pain - unable to fully extend her knee prior to her TKA -   VERY STIFF INTO EXTENSION -   Improved ROM !     Progress strengthening /stabilization /functional activity     Per MD OV note 7/5/2024:   PLAN:  PT (outpatient)                          6-week goals active range of motion 0-120.  Full extension is most important motion.  Walk without a limp or device.  Stairs with a reciprocal pattern and no railing assist to ascend.  Rail to use okay to descend.  Single leg stance for more than 10 seconds.  Stand from a 17 inch chair without upper extremity assistance.  No extension lag with straight leg raise.                          For physical therapy no resistance on cardio machines for 3 months.  No weight machines for 3 months.  No treadmill for 6 weeks.  No cuff weights greater than 2 pounds in the initial 6 weeks postoperatively.  No more than 5 pounds in the first 3 months.                                        3) Continue ice PRN              4) aspirin 81 mg orally every day for 1 month              5) Follow up in 4 weeks with repeat Xrays of Right  knee (3views)     Devin Santos MD  7/5/2024        _________________________________________________    Manual Therapy:            20     mins  50287;  Therapeutic Exercise:   15  mins  59839;     Neuromuscular [place]:   03     mins  22460;    Therapeutic Activity:     10      mins  46236;     Ultrasound:                          mins  97235;  Iontophoresis:                     mins  64452;    Electrical Stimulation:         mins  71455 (  );  Mechanical Traction:          mins  26673  Dry Needling                       mins self-pay     Timed Treatment:   48   mins                  Total Treatment:    48    mins        Ron Adair PT  Physical Therapist  Lic # [id number]

## 2024-07-23 ENCOUNTER — TREATMENT (OUTPATIENT)
Dept: PHYSICAL THERAPY | Facility: CLINIC | Age: 49
End: 2024-07-23
Payer: MEDICARE

## 2024-07-23 DIAGNOSIS — M25.561 RIGHT KNEE PAIN, UNSPECIFIED CHRONICITY: ICD-10-CM

## 2024-07-23 DIAGNOSIS — Z78.9 DIFFICULTY NAVIGATING STAIRS: ICD-10-CM

## 2024-07-23 DIAGNOSIS — M25.661 KNEE STIFFNESS, RIGHT: ICD-10-CM

## 2024-07-23 DIAGNOSIS — Z96.651 S/P TKR (TOTAL KNEE REPLACEMENT), RIGHT: Primary | ICD-10-CM

## 2024-07-23 DIAGNOSIS — R26.9 GAIT DISTURBANCE: ICD-10-CM

## 2024-07-23 DIAGNOSIS — Z74.09 IMPAIRED FUNCTIONAL MOBILITY, BALANCE, GAIT, AND ENDURANCE: ICD-10-CM

## 2024-07-23 PROCEDURE — 97110 THERAPEUTIC EXERCISES: CPT | Performed by: PHYSICAL THERAPIST

## 2024-07-23 PROCEDURE — 97530 THERAPEUTIC ACTIVITIES: CPT | Performed by: PHYSICAL THERAPIST

## 2024-07-23 PROCEDURE — 97140 MANUAL THERAPY 1/> REGIONS: CPT | Performed by: PHYSICAL THERAPIST

## 2024-07-23 NOTE — PROGRESS NOTES
Physical Therapy Daily Note    Patient Name: Aurora Bull         :  1975  Referring Physician: Devin Santos MD  Treatment Date: 2024  Date of Initial Visit: Type: THERAPY  Noted: 7/3/2024    Visit Diagnoses:    ICD-10-CM ICD-9-CM   1. S/P TKR (total knee replacement), right  Z96.651 V43.65   2. Right knee pain, unspecified chronicity  M25.561 719.46   3. Difficulty navigating stairs  Z78.9 V49.89   4. Gait disturbance  R26.9 781.2   5. Impaired functional mobility, balance, gait, and endurance  Z74.09 V49.89   6. Knee stiffness, right  M25.661 719.56     Patient seen for 6 sessions  _____________________________________________________    Subjective   Aurora Bull reports: noting improved ROM and function - working hard on extension, but still difficult - notes her knee was very stiff for years prior to her TKA -     Objective   Pt presents ambulating w/o AD - Improved knee flexion and extension in gait -   Limited knee extension, but only starting at 12-deg vs 20 as normal - Down to near 10 or less today after MT and stretching (prolonged gentle) -   Tightness w/ TPs distal/mid HS and proximal gastrocs -   Flexion nearing 120-deg -     TREATMENT:   MANUAL THERAPY:   STM / DTM / TPRt o (R) LOWER LEG, POPLITEAL REGION, QUAD , Ant scar  GENTLE ASSISTED prolonged STRETCHING into FLEXION / EXTENSION -   MANUAL DISTRACTION w/ STRETCHING into EXTENSION in PRONE   EXERCISES:   [x]   KNEE EXT STRETCH w/ HEEL PROP and under knee/gastroc to control stretch intensity -   [x]   KNEE HEEL SLIDES w/ GAIT BELT /  PT ASSIST 10/20 sec holds Flex/Ext-  []   SEATED KNEE FLEXION STRETCH w/ OPP LE ASSIST 10/15 sec HOLD and EXT STRETCH   [x]   GASTROC / KNEE EXT STRETCH STANDING 2x 60 sec ea (supine or sitting)  []   QS w/o roll to emphasize knee extension  [x]   SAQ on ROLL x 20 w/ P-H x 15  [x]   Ambulation on track w/ straight to assess gait and practice proper pattern x 1 lap  [x]   PRONE KNEE FLEXION / EXTENSION  w/ strap - 5 min each  [x]   Up / down 2 flights of stairs w/ st cane and HR Up w/ left; Down w/ right  [x]   NUSTEP Seat 8-12 L5 x 5 min SEAT 12 FOR EXT STRETCH; SEAT 8 for FLEXION STRETCH   []   OCTANE: Seat 10 for flexion stretch;  Seat 12 for Ext stretch -DEFER TODAY DUE TO TIME        NMR; Verbal and tactile cues to facilitate quad / vmo, hip, glute, LE musculature statically and dynamically. - Cuing to relax leg for knee ext stretching -  -      Functional / Therapeutic Activities:    TAPING / BRACING: K-Tape ti unload PF jt x 2 layers  SEE EXERCISEs    Assessment/Plan  47 yo female S/P (R) TKA -6/18/2024   Long history of knee pain - unable to fully extend her knee prior to her TKA -   VERY STIFF INTO EXTENSION - but improving -   Improved ROM !      Progress strengthening /stabilization /functional activity     Per MD OV note 7/5/2024:   PLAN:  PT (outpatient)                          6-week goals active range of motion 0-120.  Full extension is most important motion.  Walk without a limp or device.  Stairs with a reciprocal pattern and no railing assist to ascend.  Rail to use okay to descend.  Single leg stance for more than 10 seconds.  Stand from a 17 inch chair without upper extremity assistance.  No extension lag with straight leg raise.                          For physical therapy no resistance on cardio machines for 3 months.  No weight machines for 3 months.  No treadmill for 6 weeks.  No cuff weights greater than 2 pounds in the initial 6 weeks postoperatively.  No more than 5 pounds in the first 3 months.                                        3) Continue ice PRN              4) aspirin 81 mg orally every day for 1 month              5) Follow up in 4 weeks with repeat Xrays of Right  knee (3views)     Devin Santos MD  7/5/2024           _________________________________________________    Manual Therapy:            10     mins  33680;  Therapeutic Exercise:    20    mins  35824;      Neuromuscular [place]:        mins  94149;    Therapeutic Activity:      15      mins  33377;     Ultrasound:                          mins  41327;  Iontophoresis:                     mins  67144;    Electrical Stimulation:         mins  82418 ( );  Mechanical Traction:          mins  12047  Dry Needling                       mins self-pay     Timed Treatment:   45    mins                  Total Treatment:     45    mins        Ron Adair PT  Physical Therapist  Lic # [id number]

## 2024-07-24 ENCOUNTER — TREATMENT (OUTPATIENT)
Dept: PHYSICAL THERAPY | Facility: CLINIC | Age: 49
End: 2024-07-24
Payer: MEDICARE

## 2024-07-24 DIAGNOSIS — M25.661 KNEE STIFFNESS, RIGHT: ICD-10-CM

## 2024-07-24 DIAGNOSIS — R26.9 GAIT DISTURBANCE: ICD-10-CM

## 2024-07-24 DIAGNOSIS — Z78.9 DIFFICULTY NAVIGATING STAIRS: ICD-10-CM

## 2024-07-24 DIAGNOSIS — M25.561 RIGHT KNEE PAIN, UNSPECIFIED CHRONICITY: ICD-10-CM

## 2024-07-24 DIAGNOSIS — Z74.09 IMPAIRED FUNCTIONAL MOBILITY, BALANCE, GAIT, AND ENDURANCE: ICD-10-CM

## 2024-07-24 DIAGNOSIS — Z96.651 S/P TKR (TOTAL KNEE REPLACEMENT), RIGHT: Primary | ICD-10-CM

## 2024-07-24 NOTE — PROGRESS NOTES
Physical Therapy Daily Note    Patient Name: Aurora Bull         :  1975  Referring Physician: Devin Santos MD  Treatment Date: 2024  Date of Initial Visit: No linked episodes    Visit Diagnoses:    ICD-10-CM ICD-9-CM   1. S/P TKR (total knee replacement), right  Z96.651 V43.65   2. Right knee pain, unspecified chronicity  M25.561 719.46   3. Difficulty navigating stairs  Z78.9 V49.89   4. Gait disturbance  R26.9 781.2   5. Impaired functional mobility, balance, gait, and endurance  Z74.09 V49.89   6. Knee stiffness, right  M25.661 719.56     Patient seen for Visit count could not be calculated. Make sure you are using a visit which is associated with an episode. sessions  _____________________________________________________    Subjective   Aurora Bull reports: feeling better - improved mobility - walking better -   Pt noted severe / sudden ant/lateral knee pain when walking back from [facility] this morning just before reaching the treatment room door - she said she felt great all morning, while on NUSTEP and even after getting off and walking to the PT room until she took a step with her (R) leg just before reaching the room and had the severe pain -   C/O Lateral anterior pain throughout the treatment when actively extending her knee -      Objective   Pt demonstrating improved gait w/ improved knee extension / flexion - and WB-ing, but after her severe pain walking into treatment room he gait was then antalgic - and painful with active knee extension -   Patella sitting lateral w/ lat glide (R)   Very tight / tender posterior knee into prox 1/2 gastrocs, distal HS     TREATMENT:   MANUAL THERAPY:   STM / DTM / TPRt o (R) LOWER LEG, POPLITEAL REGION, QUAD , Ant scar  GENTLE ASSISTED prolonged STRETCHING into FLEXION / EXTENSION -   MANUAL DISTRACTION w/ STRETCHING into EXTENSION in PRONE     EXERCISES:   [x]   KNEE EXT STRETCH w/ HEEL PROP and under knee/gastroc to control stretch intensity -    [x]   KNEE HEEL SLIDES w/ GAIT BELT /  PT ASSIST 10/20 sec holds Flex/Ext-  []   SEATED KNEE FLEXION STRETCH w/ OPP LE ASSIST 10/15 sec HOLD and EXT STRETCH   [x]   GASTROC / KNEE EXT STRETCH STANDING 2x 60 sec ea (supine or sitting)  []   QS w/o roll to emphasize knee extension  []   SAQ on ROLL x 20 w/ P-H x 15  []   Ambulation on track w/ straight to assess gait and practice proper pattern x 1 lap  [x]   PRONE KNEE FLEXION / EXTENSION w/ strap - 5 min each  [x]   Up / down 2 flights of stairs w/o AD and HR Up w/ left; Down w/ right  [x]   NUSTEP Seat 8-12 L5 x 5 min SEAT 12 FOR EXT STRETCH; SEAT 8 for FLEXION STRETCH   []   OCTANE: Seat 10 for flexion stretch;  Seat 12 for Ext stretch -DEFER TODAY DUE TO TIME        NMR; Verbal and tactile cues to facilitate quad / vmo, hip, glute, LE musculature statically and dynamically. - Cuing to relax leg for knee ext stretching -  -      Functional / Therapeutic Activities:    TAPING / BRACING: K-Tape ti unload PF jt and strip w/ leukotape over to prevent lateral glide of patella -   SEE EXERCISEs    Assessment/Plan  47 yo female S/P (R) TKA -6/18/2024   Long history of knee pain - unable to fully extend her knee prior to her TKA -   VERY STIFF INTO EXTENSION - but improving -   Improved ROM ! And improved gait UNTIL walking into Therapy room and Pt had SEVERE ant/lat knee pain which persisted with antalgic gait and pain w/ active knee extension - Taping only helped partially - Possible patellar subluxation??     Progress strengthening /stabilization /functional activity - resume full program as tolerated - Told to use cane again as needed -      Per MD OV note 7/5/2024:   PLAN:  PT (outpatient)                          6-week goals active range of motion 0-120.  Full extension is most important motion.  Walk without a limp or device.  Stairs with a reciprocal pattern and no railing assist to ascend.  Rail to use okay to descend.  Single leg stance for more than 10  seconds.  Stand from a 17 inch chair without upper extremity assistance.  No extension lag with straight leg raise.                          For physical therapy no resistance on cardio machines for 3 months.  No weight machines for 3 months.  No treadmill for 6 weeks.  No cuff weights greater than 2 pounds in the initial 6 weeks postoperatively.  No more than 5 pounds in the first 3 months.                                        3) Continue ice PRN              4) aspirin 81 mg orally every day for 1 month              5) Follow up in 4 weeks with repeat Xrays of Right  knee (3views)     Devin Santos MD  7/5/2024      _________________________________________________    Manual Therapy:            10     mins  27135;  Therapeutic Exercise:    20    mins  50324;     Neuromuscular [place]:        mins  94458;    Therapeutic Activity:      15      mins  94249;     Ultrasound:                          mins  72229;  Iontophoresis:                     mins  14357;    Electrical Stimulation:         mins  73275 ( );  Mechanical Traction:          mins  24207  Dry Needling                       mins self-pay     Timed Treatment:   45    mins                  Total Treatment:     45    mins        Ron Adair PT  Physical Therapist  Lic # [id number]

## 2024-07-30 ENCOUNTER — TREATMENT (OUTPATIENT)
Dept: PHYSICAL THERAPY | Facility: CLINIC | Age: 49
End: 2024-07-30
Payer: MEDICARE

## 2024-07-30 DIAGNOSIS — R26.9 GAIT DISTURBANCE: ICD-10-CM

## 2024-07-30 DIAGNOSIS — M25.661 KNEE STIFFNESS, RIGHT: ICD-10-CM

## 2024-07-30 DIAGNOSIS — Z96.651 S/P TKR (TOTAL KNEE REPLACEMENT), RIGHT: Primary | ICD-10-CM

## 2024-07-30 DIAGNOSIS — Z74.09 IMPAIRED FUNCTIONAL MOBILITY, BALANCE, GAIT, AND ENDURANCE: ICD-10-CM

## 2024-07-30 DIAGNOSIS — M25.561 RIGHT KNEE PAIN, UNSPECIFIED CHRONICITY: ICD-10-CM

## 2024-07-30 DIAGNOSIS — Z78.9 DIFFICULTY NAVIGATING STAIRS: ICD-10-CM

## 2024-07-30 NOTE — PROGRESS NOTES
Physical Therapy Daily Note    Patient Name: Aurora Bull         :  1975  Referring Physician: Devin Santos MD  Treatment Date: 2024  Date of Initial Visit: Type: THERAPY  Noted: 7/3/2024    Visit Diagnoses:    ICD-10-CM ICD-9-CM   1. S/P TKR (total knee replacement), right  Z96.651 V43.65   2. Right knee pain, unspecified chronicity  M25.561 719.46   3. Difficulty navigating stairs  Z78.9 V49.89   4. Gait disturbance  R26.9 781.2   5. Impaired functional mobility, balance, gait, and endurance  Z74.09 V49.89   6. Knee stiffness, right  M25.661 719.56     Patient seen for 8 sessions  _____________________________________________________    Subjective   Aurora Bull reports: knee feeling better - Took > 2 days to calm down from pain incident while walking to PT room from [facility] last session - Much less pain - Mostly stiff into extension - feels very tight back of knee, into calf especially - Skin cannot tolerate tape -     Objective   Pt ambulating w/o AD - limited extension (R) knee in gait, but good WB-ing and improved pace, etc.   Limited strength w/ stepping up -  Posterior knee, gastroc and distal HS very tight w/ TPs -    TREATMENT:   MANUAL THERAPY:   STM / DTM / TPRt o (R) LOWER LEG, POPLITEAL REGION, QUAD , Ant scar  GENTLE ASSISTED prolonged STRETCHING into FLEXION / EXTENSION -   MANUAL DISTRACTION w/ STRETCHING into EXTENSION in PRONE      EXERCISES:   [x]   KNEE EXT STRETCH w/ HEEL PROP and under knee/gastroc to control stretch intensity -   [x]   KNEE HEEL SLIDES w/ GAIT BELT /  PT ASSIST 10/20 sec holds Flex/Ext-  []   SEATED KNEE FLEXION STRETCH w/ OPP LE ASSIST 10/15 sec HOLD and EXT STRETCH   [x]   GASTROC / KNEE EXT STRETCH STANDING 2x 60 sec ea (supine or sitting)  []   QS w/o roll to emphasize knee extension  []   SAQ on ROLL x 20 w/ P-H x 15  []   Ambulation on track w/ straight to assess gait and practice proper pattern x 1 lap  [x]   PRONE KNEE FLEXION / EXTENSION w/ strap  - 5 min each  [x]   Up / down 2 flights of stairs w/o AD and HR Up w/ left; Down w/ right  [x]   NUSTEP Seat 8-12 L5 x 5 min SEAT 12 FOR EXT STRETCH; SEAT 8 for FLEXION STRETCH   []   OCTANE: Seat 10 for flexion stretch;  Seat 12 for Ext stretch -DEFER TODAY DUE TO TIME        NMR; Verbal and tactile cues to facilitate quad / vmo, hip, glute, LE musculature statically and dynamically. - Cuing to relax leg for knee ext stretching -  -      Functional / Therapeutic Activities:    TAPING / BRACING: HOLD TAPING DUE TO SKIN IRRITATION  SEE EXERCISE FLOW SHEET -       Assessment/Plan  48 yo female S/P (R) TKA -6/18/2024   Long history of knee pain - unable to fully extend her knee prior to her TKA -   VERY STIFF INTO EXTENSION - but improving -   Improved ROM ! And improved gait UNTIL walking into Therapy room and Pt had SEVERE ant/lat knee pain which persisted with antalgic gait and pain w/ active knee extension - Taping only helped partially - Possible patellar subluxation??     Progress strengthening /stabilization /functional activity - resume full program as tolerated - Told to use cane again as needed -      Per MD OV note 7/5/2024:   PLAN:  PT (outpatient)                          6-week goals active range of motion 0-120.  Full extension is most important motion.  Walk without a limp or device.  Stairs with a reciprocal pattern and no railing assist to ascend.  Rail to use okay to descend.  Single leg stance for more than 10 seconds.  Stand from a 17 inch chair without upper extremity assistance.  No extension lag with straight leg raise.                          For physical therapy no resistance on cardio machines for 3 months.  No weight machines for 3 months.  No treadmill for 6 weeks.  No cuff weights greater than 2 pounds in the initial 6 weeks postoperatively.  No more than 5 pounds in the first 3 months.                                        3) Continue ice PRN              4) aspirin 81 mg orally every  day for 1 month              5) Follow up in 4 weeks with repeat Xrays of Right  knee (3views)     Devin Santos MD  7/5/2024     Progress strengthening /stabilization /functional activity       _________________________________________________    Manual Therapy:            15     mins  13335;  Therapeutic Exercise:    20    mins  29512;     Neuromuscular [place]:   05     mins  71887;    Therapeutic Activity:     13      mins  41217;     Ultrasound:                          mins  58197;  Iontophoresis:                     mins  80913;    Electrical Stimulation:         mins  38364 ( );  Mechanical Traction:          mins  76063  Dry Needling                       mins self-pay     Timed Treatment:   53    mins                  Total Treatment:     53    mins        Ron Adair PT  Physical Therapist  Lic # [id number]

## 2024-08-01 ENCOUNTER — TREATMENT (OUTPATIENT)
Dept: PHYSICAL THERAPY | Facility: CLINIC | Age: 49
End: 2024-08-01
Payer: MEDICARE

## 2024-08-01 DIAGNOSIS — M25.661 KNEE STIFFNESS, RIGHT: ICD-10-CM

## 2024-08-01 DIAGNOSIS — M25.561 RIGHT KNEE PAIN, UNSPECIFIED CHRONICITY: ICD-10-CM

## 2024-08-01 DIAGNOSIS — Z78.9 DIFFICULTY NAVIGATING STAIRS: ICD-10-CM

## 2024-08-01 DIAGNOSIS — R26.9 GAIT DISTURBANCE: ICD-10-CM

## 2024-08-01 DIAGNOSIS — Z74.09 IMPAIRED FUNCTIONAL MOBILITY, BALANCE, GAIT, AND ENDURANCE: ICD-10-CM

## 2024-08-01 DIAGNOSIS — Z96.651 S/P TKR (TOTAL KNEE REPLACEMENT), RIGHT: Primary | ICD-10-CM

## 2024-08-01 NOTE — PROGRESS NOTES
Physical Therapy Daily Note  RE-ASSESSMENT    Patient Name: Aurora Bull         :  1975  Referring Physician: Devin Santos MD  Treatment Date: 2024  Date of Initial Visit: Type: THERAPY  Noted: 7/3/2024    Visit Diagnoses:    ICD-10-CM ICD-9-CM   1. S/P TKR (total knee replacement), right  Z96.651 V43.65   2. Right knee pain, unspecified chronicity  M25.561 719.46   3. Difficulty navigating stairs  Z78.9 V49.89   4. Gait disturbance  R26.9 781.2   5. Impaired functional mobility, balance, gait, and endurance  Z74.09 V49.89   6. Knee stiffness, right  M25.661 719.56     Patient seen for 9 sessions  _____________________________________________________    Subjective   Aurora Jamienatanael reports: no new problems - To see Dr. Santos 2024.     Objective   Pt ambulating w/o AD - improved knee extension and flexion     AROM:  10-deg to 120-deg    STRENGTH: Knee Extension / Flexion 5/5  SLR w/o LAG - Limited by stiffness -     TREATMENT:   MANUAL THERAPY:   STM / DTM / TPRt o (R) LOWER LEG, POPLITEAL REGION, Hamstrings   GENTLE ASSISTED prolonged STRETCHING into FLEXION / EXTENSION -in prone   MANUAL DISTRACTION w/ STRETCHING into EXTENSION in PRONE      EXERCISES:  [x]   NUSTEP Seat 8-12 L5 x 5 min SEAT 12 FOR EXT STRETCH; SEAT 8 for FLEXION STRETCH    [x]   FWD STEP UP Hole 3 from bottom x 15 ea  [x]   LATERAL STEP UP w/ ABDuction Hole 2 from bottom x 15 ea  [x]   JEMMA HIP ABDuction 65# 2x 15 ea  [x]   JEMMA HIP ADDuction 80# 2x 15 ea  [x]   JEMMA LEG PRESS 140# 2x 15 ea  [x]   SLS 2x 1 min   [x]   Up / down 2 flights of stairs w/o AD and HR Up w/ left; Down w/ right  [x]   BALANCE BOARD Frnt/Bk;  Side/Side x 2 min ea  [x]   PRONE KNEE FLEXION / EXTENSION w/ strap - 5 min each  []   OCTANE: Seat 10 for flexion stretch;  Seat 12 for Ext stretch -DEFER TODAY DUE TO TIME        NMR; Verbal and tactile cues to facilitate quad / vmo, hip, glute, LE musculature statically and dynamically. - Cuing  to relax leg for knee ext stretching -  -      Functional / Therapeutic Activities:    TAPING / BRACING: HOLD TAPING DUE TO SKIN IRRITATION  SEE EXERCISEs -   FUNCTIONAL ASSESSMENT -         Assessment/Plan  48 yo female S/P (R) TKA -6/18/2024   Long history of knee pain - unable to fully extend her knee prior to her TKA -   VERY STIFF INTO EXTENSION - but improving -   Improved ROM ! And improved gait   (L) Knee unable to fully extend her knee for years prior to TKA -   May benefit from knee extension brace     GOAL STATUS:   GOALS;   SHORT TERM GOALS: 6 WEEKS  1) HEP Initiated; -MET  2) Pain decreased 50%: at rest and w/ ADLs, PT / Knee ROM, gait, etc - MET  3) AROM  increased:  to -deg -MET  4) Ambulating w/ normal gait pattern w/ appropriate AD - MOSTLY MET     LONG TERM GOALS: 8-12 weeks (or at time of DISCHARGE):   1) (I) HEP; --ONGOING  2) AROM WFL and pain reduced > 80% to allow normal gait, stairs, ADLs  -PROGRESSING TOWARDS  3) Strength /endurance to be able to perform all ADL's including climbing stairs reciprocally w/ HR going down and walking functional distances for ADLs and exercise w/o AD - -PROGRESSING TOWARDS  4) Functional Test KOS score improved to > 60/80 --PROGRESSING TOWARDS    Progress strengthening /stabilization /functional activity - Moving to [place] August 20th, 2024 -   Would benefit from continued PT 2x/wk x 4 weeks - and add knee extension brace to gain residual knee extension -   To see Dr. Santos next week -        _________________________________________________    Manual Therapy:            10     mins  62648;  Therapeutic Exercise:    20    mins  80156;     Neuromuscular [place]:   08     mins  77690;    Therapeutic Activity:     15      mins  08750;     Ultrasound:                          mins  37521;  Iontophoresis:                     mins  57317;    Electrical Stimulation:         mins  27293 ( );  Mechanical Traction:          mins  53731  Dry Needling                        mins self-pay     Timed Treatment:   53    mins                  Total Treatment:     53    mins        Ron Adair, PT  Physical Therapist  Lic # [id number]

## 2024-08-05 ENCOUNTER — OFFICE VISIT (OUTPATIENT)
Dept: ORTHOPEDIC SURGERY | Facility: CLINIC | Age: 49
End: 2024-08-05
Payer: MEDICARE

## 2024-08-05 ENCOUNTER — TREATMENT (OUTPATIENT)
Dept: PHYSICAL THERAPY | Facility: CLINIC | Age: 49
End: 2024-08-05
Payer: MEDICARE

## 2024-08-05 VITALS — HEIGHT: 65 IN | BODY MASS INDEX: 38.6 KG/M2 | WEIGHT: 231.7 LBS | TEMPERATURE: 97.2 F

## 2024-08-05 DIAGNOSIS — M25.661 KNEE STIFFNESS, RIGHT: ICD-10-CM

## 2024-08-05 DIAGNOSIS — Z78.9 DIFFICULTY NAVIGATING STAIRS: ICD-10-CM

## 2024-08-05 DIAGNOSIS — R52 PAIN: ICD-10-CM

## 2024-08-05 DIAGNOSIS — Z96.651 S/P TKR (TOTAL KNEE REPLACEMENT), RIGHT: Primary | ICD-10-CM

## 2024-08-05 DIAGNOSIS — M25.561 RIGHT KNEE PAIN, UNSPECIFIED CHRONICITY: ICD-10-CM

## 2024-08-05 DIAGNOSIS — R26.9 GAIT DISTURBANCE: ICD-10-CM

## 2024-08-05 DIAGNOSIS — Z74.09 IMPAIRED FUNCTIONAL MOBILITY, BALANCE, GAIT, AND ENDURANCE: ICD-10-CM

## 2024-08-05 PROCEDURE — 99024 POSTOP FOLLOW-UP VISIT: CPT | Performed by: ORTHOPAEDIC SURGERY

## 2024-08-05 PROCEDURE — 97110 THERAPEUTIC EXERCISES: CPT | Performed by: PHYSICAL THERAPIST

## 2024-08-05 PROCEDURE — 1159F MED LIST DOCD IN RCRD: CPT | Performed by: ORTHOPAEDIC SURGERY

## 2024-08-05 PROCEDURE — 73562 X-RAY EXAM OF KNEE 3: CPT | Performed by: ORTHOPAEDIC SURGERY

## 2024-08-05 PROCEDURE — 97112 NEUROMUSCULAR REEDUCATION: CPT | Performed by: PHYSICAL THERAPIST

## 2024-08-05 PROCEDURE — 1160F RVW MEDS BY RX/DR IN RCRD: CPT | Performed by: ORTHOPAEDIC SURGERY

## 2024-08-05 PROCEDURE — 97530 THERAPEUTIC ACTIVITIES: CPT | Performed by: PHYSICAL THERAPIST

## 2024-08-05 NOTE — PROGRESS NOTES
Physical Therapy Daily Note    Patient Name: Aurora Bull         :  1975  Referring Physician: Devin Santos MD  Treatment Date: 2024  Date of Initial Visit: Type: THERAPY  Noted: 7/3/2024    Visit Diagnoses:    ICD-10-CM ICD-9-CM   1. S/P TKR (total knee replacement), right  Z96.651 V43.65   2. Right knee pain, unspecified chronicity  M25.561 719.46   3. Difficulty navigating stairs  Z78.9 V49.89   4. Gait disturbance  R26.9 781.2   5. Impaired functional mobility, balance, gait, and endurance  Z74.09 V49.89   6. Knee stiffness, right  M25.661 719.56     Patient seen for 10 sessions  _____________________________________________________    Subjective   Aurora Bull reports: seeing Dr. Santos today who was pleased w/ her progress - Pt reports minimal /. No pain     Objective   Pt ambulating w/o AD w/ improved knee extension and stride length and pace    TREATMENT:   MANUAL THERAPY: (DEFERRED TODAY)  STM / DTM / TPRt o (R) LOWER LEG, POPLITEAL REGION, Hamstrings   GENTLE ASSISTED prolonged STRETCHING into FLEXION / EXTENSION -in prone   MANUAL DISTRACTION w/ STRETCHING into EXTENSION in PRONE      EXERCISES:  [x]   NUSTEP Seat 11 L8 x 10 min  EXT STRETCH;    [x]   FWD STEP UP Hole 3 from bottom x 15 ea  [x]   LATERAL STEP UP w/ ABDuction Hole 2 from bottom x 15 ea  [x]   JEMMA HIP ABDuction 65# 2x 15 ea  [x]   JEMMA HIP ADDuction 80# 2x 15 ea  [x]   JEMMA LEG PRESS 140# 2x 15 ea  []   SLS 2x 1 min   [x]   Up / down 2 flights of stairs reciprocally w/ HR as needed  [x]   SIT / STAND w/ SLOW LOWERING 2x10 std chair   []   BALANCE BOARD Frnt/Bk;  Side/Side; Angled x 2 x 2 min ea  []  SIDE STEPPING - Around Exercise Class 1 x 1 each direction  []    PRONE KNEE FLEXION / EXTENSION w/ strap - 5 min each  [x]   OCTANE: Seat 10 for flexion stretch;  Seat 12 for Ext stretch -DEFER TODAY DUE TO TIME        NMR; Verbal and tactile cues to facilitate quad / vmo, hip, glute, LE musculature statically and  dynamically. - Cuing to relax leg for knee ext stretching -  -      Functional / Therapeutic Activities:    TAPING / BRACING: HOLD TAPING DUE TO SKIN IRRITATION  SEE EXERCISEs -     Assessment/Plan  50 yo female S/P (R) TKA -6/18/2024   Long history of knee pain - unable to fully extend her knee prior to her TKA -   VERY STIFF INTO EXTENSION - but improving -   Improved ROM ! And improved gait   (L) Knee unable to fully extend her knee for years prior to TKA -   May benefit from knee extension brace     Progress strengthening /stabilization /functional activity       _________________________________________________    Manual Therapy:                 mins  65888;  Therapeutic Exercise:    17    mins  45990;     Neuromuscular [place]:   08     mins  11383;    Therapeutic Activity:     20      mins  85906;     Ultrasound:                          mins  95510;  Iontophoresis:                     mins  35255;    Electrical Stimulation:         mins  47563 ( );  Mechanical Traction:          mins  32671  Dry Needling                       mins self-pay     Timed Treatment:   45    mins                  Total Treatment:     45    mins        Ron Adair PT  Physical Therapist  Lic # [id number]

## 2024-08-05 NOTE — PROGRESS NOTES
Aurora Bull : 1975 MRN: [id number] DATE: 2024    DIAGNOSIS: 8 week follow up right total knee      SUBJECTIVE:Patient returns today for 8 week follow up of right  total knee replacement. Patient reports doing well with no unusual complaints. Appears to be progressing appropriately.    OBJECTIVE:   Exam:. The incision is well healed. No sign of infection. Range of motion is measured at 8 to 120. The calf is soft and nontender with a negative Homans sign. Strength is progressing and the patient is ambulating appropriately.    DIAGNOSTIC STUDIES  Xrays: 3 views of the right  knee (AP, lateral, and sunrise) were ordered and reviewed for evaluation of recent knee replacement. They demonstrate a well positioned, well aligned knee replacement without complicating factors noted. In comparison with previous films there has been no change.    ASSESSMENT: 8 week status post right knee replacement.    PLAN: 1) Continue with PT exercises as prescribed   2) D/C DVT ppx per ortho   3) Follow up in 8 weeks    However she is having to move to [place] with her  for some family matters.  She states she will get set up with an orthopedic out there upon her arrival.  There is any issues during the interim she will always be able to give us a call with any questions or concerns.    Antibiotics before dental and GI procedures discussed.     Devin Santos MD  2024

## 2024-08-07 ENCOUNTER — TREATMENT (OUTPATIENT)
Dept: PHYSICAL THERAPY | Facility: CLINIC | Age: 49
End: 2024-08-07
Payer: MEDICARE

## 2024-08-07 DIAGNOSIS — Z78.9 DIFFICULTY NAVIGATING STAIRS: ICD-10-CM

## 2024-08-07 DIAGNOSIS — Z74.09 IMPAIRED FUNCTIONAL MOBILITY, BALANCE, GAIT, AND ENDURANCE: ICD-10-CM

## 2024-08-07 DIAGNOSIS — Z96.651 S/P TKR (TOTAL KNEE REPLACEMENT), RIGHT: Primary | ICD-10-CM

## 2024-08-07 DIAGNOSIS — M25.661 KNEE STIFFNESS, RIGHT: ICD-10-CM

## 2024-08-07 DIAGNOSIS — R26.9 GAIT DISTURBANCE: ICD-10-CM

## 2024-08-07 DIAGNOSIS — M25.561 RIGHT KNEE PAIN, UNSPECIFIED CHRONICITY: ICD-10-CM

## 2024-08-07 NOTE — PROGRESS NOTES
Physical Therapy Daily Note    Patient Name: Aurora Bull         :  1975  Referring Physician: Devin Santos MD  Treatment Date: 2024  Date of Initial Visit: Type: THERAPY  Noted: 7/3/2024    Visit Diagnoses:    ICD-10-CM ICD-9-CM   1. S/P TKR (total knee replacement), right  Z96.651 V43.65   2. Right knee pain, unspecified chronicity  M25.561 719.46   3. Difficulty navigating stairs  Z78.9 V49.89   4. Gait disturbance  R26.9 781.2   5. Impaired functional mobility, balance, gait, and endurance  Z74.09 V49.89   6. Knee stiffness, right  M25.661 719.56     Patient seen for 11 sessions  _____________________________________________________    Subjective   Aurora Bull reports: increased pain anterior knee around knee cap - the night after last PT session - increased w/ WB-ing and stairs -     Objective   Pt ambulating w/ flexed (R) knee and more antalgic today -   Tender around PF jt and infrapatellar region -     TREATMENT:   MANUAL THERAPY:   STM / DTM / TPRt o (R) LOWER LEG, POPLITEAL REGION, Hamstrings   GENTLE ASSISTED prolonged STRETCHING into FLEXION / EXTENSION -in prone   MANUAL DISTRACTION w/ STRETCHING into EXTENSION in PRONE      EXERCISES:  [x]   NUSTEP Seat 11 L8 x 10 min  EXT STRETCH;    []   FWD STEP UP Hole 3 from bottom x 15 ea  []   LATERAL STEP UP w/ ABDuction Hole 2 from bottom x 15 ea  [x]   JEMMA HIP ABDuction 70# 2x 15 ea  [x]   JEMMA HIP ADDuction 125# 2x 15 ea  [x]   JEMMA LEG PRESS 120# 2x 15 ea  []   SLS 2x 1 min   [x]   Up / down 2 flights of stairs reciprocally w/ HR as needed  [x]   SIT / STAND w/ SLOW LOWERING 2x10 std chair   []   BALANCE BOARD Frnt/Bk;  Side/Side; Angled x 2 x 2 min ea  [x]  SIDE STEPPING -40 ft x 2 ea;  [x]   MONSTER WALK Fwd/ Retro 40 ft ea  [x]    PRONE KNEE FLEXION / EXTENSION w/ - 3x2 min ea  []   OCTANE: Seat 10 for flexion stretch;  Seat 12 for Ext stretch -DEFER TODAY DUE TO TIME        NMR; Verbal and tactile cues to facilitate quad /  vmo, hip, glute, LE musculature statically and dynamically. - Cuing to relax leg for knee ext stretching -  -         Functional / Therapeutic Activities:    TAPING / BRACING: K-tape to 1) Unload PF jt and 2) Unload infrapatellar region (R) knee -   SEE EXERCISE FLOW SHEET -     Assessment/Plan  48 yo female S/P (R) TKA -6/18/2024   Long history of knee pain - unable to fully extend her knee prior to her TKA -   VERY STIFF INTO EXTENSION - but improving -   Improved ROM ! And improved gait   (L) Knee unable to fully extend her knee for years prior to TKA -   May benefit from knee extension brace     Ant knee pain / PF pain due to over-doing-it in PT - no pain during, but later that evening - Taping alleviated this pain - and allowed improved gait and tolerance to PT -     Progress strengthening /stabilization /functional activity       _________________________________________________    Manual Therapy:                 mins  45760;  Therapeutic Exercise:    20    mins  71765;     Neuromuscular [place]:   05     mins  28601;    Therapeutic Activity:     15      mins  98258;     Ultrasound:                          mins  33568;  Iontophoresis:                     mins  60044;    Electrical Stimulation:         mins  20006 ( );  Mechanical Traction:          mins  29833  Dry Needling                       mins self-pay     Timed Treatment:   40    mins                  Total Treatment:     40    mins        Ron Adair, PT  Physical Therapist  Lic # [id number]

## 2024-08-08 ENCOUNTER — OFFICE VISIT (OUTPATIENT)
Dept: FAMILY MEDICINE CLINIC | Facility: CLINIC | Age: 49
End: 2024-08-08
Payer: MEDICARE

## 2024-08-08 VITALS
HEIGHT: 65 IN | HEART RATE: 81 BPM | DIASTOLIC BLOOD PRESSURE: 82 MMHG | TEMPERATURE: 98 F | WEIGHT: 232 LBS | BODY MASS INDEX: 38.65 KG/M2 | OXYGEN SATURATION: 99 % | SYSTOLIC BLOOD PRESSURE: 124 MMHG

## 2024-08-08 DIAGNOSIS — Z96.651 S/P TKR (TOTAL KNEE REPLACEMENT), RIGHT: ICD-10-CM

## 2024-08-08 DIAGNOSIS — M17.12 PRIMARY OSTEOARTHRITIS OF LEFT KNEE: Primary | ICD-10-CM

## 2024-08-08 PROCEDURE — 1125F AMNT PAIN NOTED PAIN PRSNT: CPT | Performed by: NURSE PRACTITIONER

## 2024-08-08 PROCEDURE — 1160F RVW MEDS BY RX/DR IN RCRD: CPT | Performed by: NURSE PRACTITIONER

## 2024-08-08 PROCEDURE — 1159F MED LIST DOCD IN RCRD: CPT | Performed by: NURSE PRACTITIONER

## 2024-08-08 PROCEDURE — 99213 OFFICE O/P EST LOW 20 MIN: CPT | Performed by: NURSE PRACTITIONER

## 2024-08-08 RX ORDER — HYDROCODONE BITARTRATE AND ACETAMINOPHEN 7.5; 325 MG/1; MG/1
1 TABLET ORAL EVERY 4 HOURS PRN
Qty: 30 TABLET | Refills: 0 | Status: SHIPPED | OUTPATIENT
Start: 2024-08-08

## 2024-08-08 NOTE — PROGRESS NOTES
"Chief Complaint  Knee Pain (L knee pain x years 5/10. Hoping to restart Norco. Had R TKA 6/18/24)    Subjective        Aurora Bull presents to [facility]  History of Present Illness  Aurora Bull is a 49 y.o. female who presents to the clinic to discuss knee pain. Patient underwent right total knee replacement for right knee end-stage osteoarthritis on 6/18/2024.  She was seen by her orthopedist for follow-up on 8/5/2024.  At that time, MD reported patient appeared to be progressing appropriately. She is in physical therapy. She is no longer taking hydrocodone post-operatively.  She is, however, having left knee pain now.  She does have osteoarthritis to the left knee as well.  She is moving back to [place] in September and is worried about the 5-day car trip and increasing pain.      Objective   Vital Signs:  /82   Pulse 81   Temp 98 °F (36.7 °C)   Ht 165.1 cm (65\")   Wt 105 kg (232 lb)   SpO2 99%   BMI 38.61 kg/m²   Estimated body mass index is 38.61 kg/m² as calculated from the following:    Height as of this encounter: 165.1 cm (65\").    Weight as of this encounter: 105 kg (232 lb).           Physical Exam  Vitals reviewed.   Constitutional:       General: She is not in acute distress.     Appearance: Normal appearance. She is not ill-appearing, toxic-appearing or diaphoretic.   HENT:      Head: Normocephalic and atraumatic.   Pulmonary:      Effort: Pulmonary effort is normal. No respiratory distress.   Neurological:      General: No focal deficit present.      Mental Status: She is alert and oriented to person, place, and time.      Motor: Weakness present.      Gait: Gait normal.   Psychiatric:         Mood and Affect: Mood normal.         Behavior: Behavior normal.        Result Review :          Op Note by Devin Santos MD (06/18/2024 07:33)   Discharge Summary by Devin Santos MD (06/18/2024 13:45)   Progress Notes by Devin Santos MD (08/05/2024 " 09:40)     ToxASSURE Select 13 Discrete - (05/22/2024 13:41)   CONTROLLED SUBSTANCE AGREEMENT - SCAN - HYDROCODONE, OSTEOARTHRITIS, 245580 (05/22/2024)          Assessment and Plan     Diagnoses and all orders for this visit:    1. Primary osteoarthritis of left knee (Primary)  -     HYDROcodone-acetaminophen (Norco) 7.5-325 MG per tablet; Take 1 tablet by mouth Every 4 (Four) Hours As Needed for Moderate Pain.  Dispense: 30 tablet; Refill: 0    2. S/P TKR (total knee replacement), right      Patient is doing well postoperatively after right total knee replacement.  She does require left knee replacement as well for osteoarthritis.  She does have intermittent left knee pain and is worried about her recent trip back to [place].  Controlled substance contract and urine drug screen performed in May. Will refill short course of Norco. She will need to establish with PCP in [place] for future refills.          Follow Up     Return if symptoms worsen or fail to improve.  Patient was given instructions and counseling regarding her condition or for health maintenance advice. Please see specific information pulled into the AVS if appropriate.     Electronically signed by MJ Valadez, 08/08/24, 11:10 AM EDT.

## 2024-08-12 ENCOUNTER — TREATMENT (OUTPATIENT)
Dept: PHYSICAL THERAPY | Facility: CLINIC | Age: 49
End: 2024-08-12
Payer: MEDICARE

## 2024-08-12 DIAGNOSIS — M25.561 RIGHT KNEE PAIN, UNSPECIFIED CHRONICITY: ICD-10-CM

## 2024-08-12 DIAGNOSIS — M25.661 KNEE STIFFNESS, RIGHT: ICD-10-CM

## 2024-08-12 DIAGNOSIS — Z78.9 DIFFICULTY NAVIGATING STAIRS: ICD-10-CM

## 2024-08-12 DIAGNOSIS — Z96.651 S/P TKR (TOTAL KNEE REPLACEMENT), RIGHT: Primary | ICD-10-CM

## 2024-08-12 DIAGNOSIS — Z74.09 IMPAIRED FUNCTIONAL MOBILITY, BALANCE, GAIT, AND ENDURANCE: ICD-10-CM

## 2024-08-12 PROCEDURE — 97110 THERAPEUTIC EXERCISES: CPT | Performed by: PHYSICAL THERAPIST

## 2024-08-12 PROCEDURE — 97530 THERAPEUTIC ACTIVITIES: CPT | Performed by: PHYSICAL THERAPIST

## 2024-08-12 PROCEDURE — 97112 NEUROMUSCULAR REEDUCATION: CPT | Performed by: PHYSICAL THERAPIST

## 2024-08-12 NOTE — PROGRESS NOTES
Physical Therapy Daily Note    Patient Name: Aurora Bull         :  1975  Referring Physician: Devin Santos MD  Treatment Date: 2024  Date of Initial Visit: Type: THERAPY  Noted: 7/3/2024    Visit Diagnoses:    ICD-10-CM ICD-9-CM   1. S/P TKR (total knee replacement), right  Z96.651 V43.65   2. Right knee pain, unspecified chronicity  M25.561 719.46   3. Difficulty navigating stairs  Z78.9 V49.89   4. Knee stiffness, right  M25.661 719.56   5. Impaired functional mobility, balance, gait, and endurance  Z74.09 V49.89     Patient seen for 12 sessions  _____________________________________________________    Subjective   Aurora Bull reports: Moving back to [place] 2025 - Knee feeling much better - tape very helpful, but has skin irritation - (L) knee hurting more today - (R) knee feeling very good -      Objective   Ambulating w/o AD - increased WB-ing some residual knee flexion posture, but improving -     TREATMENT:   MANUAL THERAPY:   STM / DTM / TPRt o (R) LOWER LEG, POPLITEAL REGION, Hamstrings   GENTLE ASSISTED prolonged STRETCHING into FLEXION / EXTENSION -in prone   MANUAL DISTRACTION w/ STRETCHING into EXTENSION in PRONE      EXERCISES:  [x]   NUSTEP Seat 11 L8 x 10 min  EXT STRETCH;    []   FWD STEP UP Hole 3 from bottom x 15 ea  []   LATERAL STEP UP w/ ABDuction Hole 2 from bottom x 15 ea  [x]   JEMMA HIP ABDuction 70# 2x 15 ea  [x]   JEMMA HIP ADDuction 115# 2x 15 ea  [x]   JEMMA LEG PRESS 135# 2x 15 ea  [x]   SLS 2x 1 min   [x]   Up / down 2 flights of stairs reciprocally w/ HR as needed  [x]   SIT / STAND w/ SLOW LOWERING 2x10 std chair   []   BALANCE BOARD Frnt/Bk;  Side/Side; Angled x 2 x 2 min ea  [x]  SIDE STEPPING -40 ft x 2 ea;  [x]   MONSTER WALK Fwd/ Retro 40 ft ea  [x]    PRONE KNEE FLEXION / EXTENSION w/ - 3x2 min ea  []   DONITA: Seat 10 for flexion stretch;  Seat 12 for Ext stretch -DEFER TODAY DUE TO TIME        NMR; Verbal and tactile cues to facilitate quad /  vmo, hip, glute, LE musculature statically and dynamically. - Cuing to relax leg for knee ext stretching -  -         Functional / Therapeutic Activities:    TAPING / BRACING: NA - bad skin reaction  SEE EXERCISEs      Assessment/Plan  48 yo female S/P (R) TKA -6/18/2024   Long history of knee pain - unable to fully extend her knee prior to her TKA -   VERY STIFF INTO EXTENSION - but improving -   Improved ROM ! And improved gait   (L) Knee unable to fully extend her knee for years prior to TKA -   May benefit from knee extension brace     Ant knee pain last session resolved w/ taping, etc, but skin reaction to tape - Back to baseline     Progress strengthening /stabilization /functional activity       _________________________________________________    Manual Therapy:                 mins  72432;  Therapeutic Exercise:    15    mins  33564;     Neuromuscular [place]:   05     mins  77950;    Therapeutic Activity:     20      mins  31535;     Ultrasound:                          mins  10385;  Iontophoresis:                     mins  92730;    Electrical Stimulation:         mins  17684 ( );  Mechanical Traction:          mins  21895  Dry Needling                       mins self-pay     Timed Treatment:   40    mins                  Total Treatment:     40    mins        Ron Adair PT  Physical Therapist  Lic # [id number]

## 2024-08-15 ENCOUNTER — TREATMENT (OUTPATIENT)
Dept: PHYSICAL THERAPY | Facility: CLINIC | Age: 49
End: 2024-08-15
Payer: MEDICARE

## 2024-08-15 DIAGNOSIS — M25.661 KNEE STIFFNESS, RIGHT: ICD-10-CM

## 2024-08-15 DIAGNOSIS — Z96.651 S/P TKR (TOTAL KNEE REPLACEMENT), RIGHT: Primary | ICD-10-CM

## 2024-08-15 DIAGNOSIS — Z74.09 IMPAIRED FUNCTIONAL MOBILITY, BALANCE, GAIT, AND ENDURANCE: ICD-10-CM

## 2024-08-15 DIAGNOSIS — R26.9 GAIT DISTURBANCE: ICD-10-CM

## 2024-08-15 DIAGNOSIS — Z78.9 DIFFICULTY NAVIGATING STAIRS: ICD-10-CM

## 2024-08-15 DIAGNOSIS — M25.561 RIGHT KNEE PAIN, UNSPECIFIED CHRONICITY: ICD-10-CM

## 2024-08-15 NOTE — PROGRESS NOTES
Physical Therapy Daily Note    Patient Name: Aurora Bull         :  1975  Referring Physician: Devin Santos MD  Treatment Date: 8/15/2024  Date of Initial Visit: No linked episodes    Visit Diagnoses:    ICD-10-CM ICD-9-CM   1. S/P TKR (total knee replacement), right  Z96.651 V43.65   2. Right knee pain, unspecified chronicity  M25.561 719.46   3. Difficulty navigating stairs  Z78.9 V49.89   4. Knee stiffness, right  M25.661 719.56   5. Impaired functional mobility, balance, gait, and endurance  Z74.09 V49.89   6. Gait disturbance  R26.9 781.2     Patient seen for Visit count could not be calculated. Make sure you are using a visit which is associated with an episode. sessions  _____________________________________________________    Subjective   Aurora Bull reports: continued improvement -     Objective   Pt ambulating w/o AD - improved knee extension and flexion      AROM:  8-7-deg Extension to 120-deg    STRENGTH: Knee Extension / Flexion 5/5  SLR w/o LAG - Limited by stiffness -     TREATMENT:   MANUAL THERAPY: (DEFERRED TODAY)  STM / DTM / TPRt o (R) LOWER LEG, POPLITEAL REGION, Hamstrings   GENTLE ASSISTED prolonged STRETCHING into FLEXION / EXTENSION -in prone   MANUAL DISTRACTION w/ STRETCHING into EXTENSION in PRONE      EXERCISES:  [x]   NUSTEP Seat 11 L8 x 10 min  EXT STRETCH;    []   FWD STEP UP Hole 3 from bottom x 15 ea  []   LATERAL STEP UP w/ ABDuction Hole 2 from bottom x 15 ea  [x]   JEMMA HIP ABDuction 70# 2x 15 ea  [x]   JEMMA HIP ADDuction 115# 2x 15 ea  [x]   JEMMA LEG PRESS 135# 2x 15 ea  [x]   SLS 2x 1 min   [x]   Up / down 2 flights of stairs reciprocally w/ HR as needed  [x]   SIT / STAND w/ SLOW LOWERING 2x10 std chair   []   BALANCE BOARD Frnt/Bk;  Side/Side; Angled x 2 x 2 min ea  [x]  SIDE STEPPING -40 ft x 2 ea;  [x]   MONSTER WALK Fwd/ Retro 40 ft ea  [x]    PRONE KNEE FLEXION / EXTENSION w/ - 3x2 min ea  []   [facility]: Seat 10 for flexion stretch;  Seat 12 for Ext  stretch -DEFER TODAY DUE TO TIME        NMR; Verbal and tactile cues to facilitate quad / vmo, hip, glute, LE musculature statically and dynamically. - Cuing to relax leg for knee ext stretching -  -         Functional / Therapeutic Activities:    TAPING / BRACING:NA - bad skin reaction  SEE EXERCISEs    Assessment/Plan  50 yo female S/P (R) TKA -6/18/2024   Long history of knee pain - unable to fully extend her knee prior to her TKA -   VERY STIFF INTO EXTENSION - but improving -   Improved ROM ! And improved gait   (L) Knee unable to fully extend her knee for years prior to TKA -   May benefit from knee extension brace     GOAL STATUS:   GOALS;   SHORT TERM GOALS: 6 WEEKS  1) HEP Initiated; -MET  2) Pain decreased 50%: at rest and w/ ADLs, PT / Knee ROM, gait, etc - MET  3) AROM  increased:  to -deg -MET  4) Ambulating w/ normal gait pattern w/ appropriate AD -  MET     LONG TERM GOALS: 8-12 weeks (or at time of DISCHARGE):   1) (I) HEP; --ONGOING  2) AROM WFL and pain reduced > 80% to allow normal gait, stairs, ADLs  -MET  3) Strength /endurance to be able to perform all ADL's including climbing stairs reciprocally w/ HR going down and walking functional distances for ADLs and exercise w/o AD - -MET  4) Functional Test KOS score improved to > 60/80 --NOT MET 42/80          Anticipate DC next Visit due to Pt moving to [place] 8/20/2024.       _________________________________________________    Manual Therapy:                 mins  28864;  Therapeutic Exercise:    18    mins  60661;     Neuromuscular [place]:   05     mins  41790;    Therapeutic Activity:     20      mins  23135;     Ultrasound:                          mins  14699;  Iontophoresis:                     mins  46436;    Electrical Stimulation:         mins  33503 ( );  Mechanical Traction:          mins  37153  Dry Needling                       mins self-pay     Timed Treatment:   43    mins                  Total Treatment:     43     mins        Ron Adair, PT  Physical Therapist  Lic # [id number]

## 2024-08-19 ENCOUNTER — TREATMENT (OUTPATIENT)
Dept: PHYSICAL THERAPY | Facility: CLINIC | Age: 49
End: 2024-08-19
Payer: MEDICARE

## 2024-08-19 DIAGNOSIS — R26.9 GAIT DISTURBANCE: ICD-10-CM

## 2024-08-19 DIAGNOSIS — Z78.9 DIFFICULTY NAVIGATING STAIRS: ICD-10-CM

## 2024-08-19 DIAGNOSIS — M25.561 RIGHT KNEE PAIN, UNSPECIFIED CHRONICITY: ICD-10-CM

## 2024-08-19 DIAGNOSIS — M25.661 KNEE STIFFNESS, RIGHT: ICD-10-CM

## 2024-08-19 DIAGNOSIS — Z96.651 S/P TKR (TOTAL KNEE REPLACEMENT), RIGHT: Primary | ICD-10-CM

## 2024-08-19 DIAGNOSIS — Z74.09 IMPAIRED FUNCTIONAL MOBILITY, BALANCE, GAIT, AND ENDURANCE: ICD-10-CM

## 2024-08-19 NOTE — PROGRESS NOTES
Physical Therapy Daily Note  DISCHARGE    Patient Name: Aurora Bull         :  1975  Referring Physician: Devin Santos MD  Treatment Date: 2024  Date of Initial Visit: Type: THERAPY  Noted: 7/3/2024    Visit Diagnoses:    ICD-10-CM ICD-9-CM   1. S/P TKR (total knee replacement), right  Z96.651 V43.65   2. Right knee pain, unspecified chronicity  M25.561 719.46   3. Difficulty navigating stairs  Z78.9 V49.89   4. Knee stiffness, right  M25.661 719.56   5. Impaired functional mobility, balance, gait, and endurance  Z74.09 V49.89   6. Gait disturbance  R26.9 781.2     Patient seen for 14 sessions  _____________________________________________________    Subjective   Aurora Bull reports: (R) knee doing great - (L) knee hurting much more after doing a lot of shopping yesterday - packing to move back to [place] on  -     Objective   Antalgic gait due to (L) knee pain -     TREATMENT:   MANUAL THERAPY: (DEFERRED TODAY)  STM / DTM / TPRt o (R) LOWER LEG, POPLITEAL REGION, Hamstrings   GENTLE ASSISTED prolonged STRETCHING into FLEXION / EXTENSION -in prone   MANUAL DISTRACTION w/ STRETCHING into EXTENSION in PRONE      EXERCISES:  [x]   NUSTEP Seat 11 L8 x 10 min  EXT STRETCH;    []   FWD STEP UP Hole 3 from bottom x 15 ea  []   LATERAL STEP UP w/ ABDuction Hole 2 from bottom x 15 ea  [x]   JEMMA HIP ABDuction 70# 2x 15 ea  [x]   JEMMA HIP ADDuction 115# 2x 15 ea  [x]   JEMMA LEG PRESS 135# 2x 15 ea  [x]   SLS 2x 1 min   [x]   Up / down 2 flights of stairs reciprocally w/ HR as needed  [x]   SIT / STAND w/ SLOW LOWERING 2x10 std chair   []   BALANCE BOARD Frnt/Bk;  Side/Side; Angled x 2 x 2 min ea  [x]  SIDE STEPPING -40 ft x 2 ea;  [x]   MONSTER WALK Fwd/ Retro 40 ft ea  [x]    PRONE KNEE FLEXION / EXTENSION w/ - 3x2 min ea  []   OCTANE: Seat 10 for flexion stretch;  Seat 12 for Ext stretch -DEFER TODAY DUE TO TIME        NMR; Verbal and tactile cues to facilitate quad / vmo, hip, glute, LE  musculature statically and dynamically. - Cuing to relax leg for knee ext stretching -  -      Functional / Therapeutic Activities:    TAPING / BRACING: NA  SEE EXERCISEs     Assessment/Plan  50 yo female S/P (R) TKA -6/18/2024   Long history of knee pain - unable to fully extend her knee prior to her TKA -   STIFF INTO EXTENSION - but improving -   Improved ROM ! And improved gait   (L) Knee unable to fully extend her knee for years prior to TKA -   Much more functional -   Would benefit from continuing HEP and pursuing PT once settled back in [place] -    Most goals met - residual stiffness into extension -     DC PT to HEP - Pt moving to [place] tomorrow - Pt to contact her PC to get in to see an orthopedist for her (L) knee -        _________________________________________________    Manual Therapy:                 mins  95562;  Therapeutic Exercise:    20    mins  83958;     Neuromuscular [place]:   05     mins  87928;    Therapeutic Activity:     15      mins  78651;     Ultrasound:                          mins  69177;  Iontophoresis:                     mins  54312;    Electrical Stimulation:         mins  88982 ( );  Mechanical Traction:          mins  55496  Dry Needling                       mins self-pay     Timed Treatment:   40    mins                  Total Treatment:     40    mins        Ron Adair, PT  Physical Therapist  Lic # [id number]

## (undated) DEVICE — VIAL FORMLN CAP 10PCT 20ML

## (undated) DEVICE — BLD DEBAKY BEAVER MAMMATOME 8MM

## (undated) DEVICE — APPL DURAPREP IODOPHOR APL 26ML

## (undated) DEVICE — KT ORCA ORCAPOD DISP STRL

## (undated) DEVICE — GLV SURG SENSICARE PI LF PF 8 GRN STRL

## (undated) DEVICE — EPIDURAL TRAY: Brand: MEDLINE INDUSTRIES, INC.

## (undated) DEVICE — PATIENT RETURN ELECTRODE, SINGLE-USE, CONTACT QUALITY MONITORING, ADULT, WITH 9FT CORD, FOR PATIENTS WEIGING OVER 33LBS. (15KG): Brand: MEGADYNE

## (undated) DEVICE — INTENDED TO SUPPORT AND MAINTAIN THE POSITION OF AN ANESTHETIZED PATIENT DURING SURGERY: Brand: HERMANTOR XL PINK KNEE POSITIONING PAD

## (undated) DEVICE — GOWN ISOL W/THUMB UNIV BLU BX/15

## (undated) DEVICE — MAT FLR ABSORBENT LG 4FT 10 2.5FT

## (undated) DEVICE — 450 ML BOTTLE OF 0.05% CHLORHEXIDINE GLUCONATE IN 99.95% STERILE WATER FOR IRRIGATION, USP AND APPLICATOR.: Brand: IRRISEPT ANTIMICROBIAL WOUND LAVAGE

## (undated) DEVICE — SPNG GZ WOVN 4X4IN 12PLY 10/BX STRL

## (undated) DEVICE — TRAP FLD MINIVAC MEGADYNE 100ML

## (undated) DEVICE — Device

## (undated) DEVICE — SINGLE-USE BIOPSY FORCEPS: Brand: RADIAL JAW 4

## (undated) DEVICE — NEEDLE, QUINCKE 22GX3.5": Brand: MEDLINE INDUSTRIES, INC.

## (undated) DEVICE — MEDI-VAC YANKAUER SUCTION HANDLE W/BULBOUS TIP: Brand: CARDINAL HEALTH

## (undated) DEVICE — THE STERILE LIGHT HANDLE COVER IS USED WITH STERIS SURGICAL LIGHTING AND VISUALIZATION SYSTEMS.

## (undated) DEVICE — SYS CLS SKIN PREMIERPRO EXOFINFUSION 22CM

## (undated) DEVICE — 3M™ IOBAN™ 2 ANTIMICROBIAL INCISE DRAPE 6640EZ: Brand: IOBAN™ 2

## (undated) DEVICE — PK KN TOTL 40

## (undated) DEVICE — MSK ENDO PORT O2 POM ELITE CURAPLEX A/

## (undated) DEVICE — DUAL CUT SAGITTAL BLADE

## (undated) DEVICE — SOL IRR NACL 0.9PCT 3000ML

## (undated) DEVICE — NDL SPINE 22G 31/2IN BLK

## (undated) DEVICE — UNDYED BRAIDED (POLYGLACTIN 910), SYNTHETIC ABSORBABLE SUTURE: Brand: COATED VICRYL

## (undated) DEVICE — BITEBLOCK OMNI BLOC

## (undated) DEVICE — GLV SURG BIOGEL LTX PF 8

## (undated) DEVICE — SOL ISO/ALC 70PCT 4OZ

## (undated) DEVICE — GOWN ,SIRUS,NONREINFORCED 3XL: Brand: MEDLINE

## (undated) DEVICE — FLEX ADVANTAGE 1500CC: Brand: FLEX ADVANTAGE

## (undated) DEVICE — BNDG,ELSTC,MATRIX,STRL,6"X5YD,LF,HOOK&LP: Brand: MEDLINE

## (undated) DEVICE — PREMIUM WET SKIN PREP TRAY: Brand: MEDLINE INDUSTRIES, INC.

## (undated) DEVICE — UNDERCAST PADDING: Brand: DEROYAL

## (undated) DEVICE — PREP SOL POVIDONE/IODINE BT 4OZ

## (undated) DEVICE — SUT VIC 0 CT1 CR8 27IN JJ41G

## (undated) DEVICE — SNAR POLYP SENSATION STDOVL 27 240 BX40

## (undated) DEVICE — GLV SURG TRIUMPH PF LTX 7 STRL

## (undated) DEVICE — DRSNG SURESITE WNDW 2.38X2.75